# Patient Record
Sex: FEMALE | Race: OTHER | HISPANIC OR LATINO | Employment: FULL TIME | URBAN - METROPOLITAN AREA
[De-identification: names, ages, dates, MRNs, and addresses within clinical notes are randomized per-mention and may not be internally consistent; named-entity substitution may affect disease eponyms.]

---

## 2017-07-07 ENCOUNTER — HOSPITAL ENCOUNTER (EMERGENCY)
Facility: HOSPITAL | Age: 22
Discharge: HOME | End: 2017-07-07
Attending: EMERGENCY MEDICINE | Admitting: EMERGENCY MEDICINE
Payer: COMMERCIAL

## 2017-07-07 VITALS
RESPIRATION RATE: 18 BRPM | OXYGEN SATURATION: 99 % | HEART RATE: 87 BPM | WEIGHT: 180 LBS | SYSTOLIC BLOOD PRESSURE: 134 MMHG | TEMPERATURE: 97.8 F | DIASTOLIC BLOOD PRESSURE: 77 MMHG

## 2017-07-07 DIAGNOSIS — J06.9 URI WITH COUGH AND CONGESTION: Primary | ICD-10-CM

## 2017-07-07 PROCEDURE — 94640 AIRWAY INHALATION TREATMENT: CPT

## 2017-07-07 PROCEDURE — 99284 EMERGENCY DEPT VISIT MOD MDM: CPT

## 2017-07-07 RX ORDER — AMILORIDE HCL 5 MG
10 TABLET ORAL EVERY 6 HOURS PRN
Qty: 12 TABLET | Refills: 0 | Status: SHIPPED | OUTPATIENT
Start: 2017-07-07 | End: 2017-09-14 | Stop reason: ALTCHOICE

## 2017-07-07 RX ORDER — ALBUTEROL SULFATE 2.5 MG/3ML
2.5 SOLUTION RESPIRATORY (INHALATION) ONCE
Status: COMPLETED | OUTPATIENT
Start: 2017-07-07 | End: 2017-07-07

## 2017-07-07 RX ORDER — FLUTICASONE PROPIONATE 50 MCG
1 SPRAY, SUSPENSION (ML) NASAL DAILY
Qty: 16 G | Refills: 0 | Status: SHIPPED | OUTPATIENT
Start: 2017-07-07 | End: 2017-09-14 | Stop reason: ALTCHOICE

## 2017-07-07 RX ADMIN — ALBUTEROL SULFATE 2.5 MG: 2.5 SOLUTION RESPIRATORY (INHALATION) at 01:38

## 2017-09-14 ENCOUNTER — HOSPITAL ENCOUNTER (EMERGENCY)
Facility: HOSPITAL | Age: 22
Discharge: HOME | End: 2017-09-14
Attending: EMERGENCY MEDICINE | Admitting: EMERGENCY MEDICINE
Payer: COMMERCIAL

## 2017-09-14 VITALS
BODY MASS INDEX: 28.93 KG/M2 | HEIGHT: 66 IN | OXYGEN SATURATION: 98 % | SYSTOLIC BLOOD PRESSURE: 121 MMHG | WEIGHT: 180 LBS | RESPIRATION RATE: 18 BRPM | HEART RATE: 84 BPM | DIASTOLIC BLOOD PRESSURE: 78 MMHG | TEMPERATURE: 97.4 F

## 2017-09-14 DIAGNOSIS — B34.9 VIRAL SYNDROME: Primary | ICD-10-CM

## 2017-09-14 PROCEDURE — 94640 AIRWAY INHALATION TREATMENT: CPT

## 2017-09-14 PROCEDURE — 96372 THER/PROPH/DIAG INJ SC/IM: CPT

## 2017-09-14 PROCEDURE — 99283 EMERGENCY DEPT VISIT LOW MDM: CPT

## 2017-09-14 RX ORDER — FLUTICASONE PROPIONATE 50 MCG
1 SPRAY, SUSPENSION (ML) NASAL DAILY
Qty: 16 G | Refills: 0 | Status: SHIPPED | OUTPATIENT
Start: 2017-09-14 | End: 2017-10-18

## 2017-09-14 RX ORDER — KETOROLAC TROMETHAMINE 30 MG/ML
15 INJECTION, SOLUTION INTRAMUSCULAR; INTRAVENOUS ONCE
Status: COMPLETED | OUTPATIENT
Start: 2017-09-14 | End: 2017-09-14

## 2017-09-14 RX ADMIN — KETOROLAC TROMETHAMINE 15 MG: 30 INJECTION, SOLUTION INTRAMUSCULAR at 19:48

## 2017-09-14 RX ADMIN — ALBUTEROL SULFATE 5 MG: 2.5 SOLUTION RESPIRATORY (INHALATION) at 19:52

## 2017-10-18 ENCOUNTER — APPOINTMENT (EMERGENCY)
Dept: RADIOLOGY | Facility: HOSPITAL | Age: 22
End: 2017-10-18
Payer: COMMERCIAL

## 2017-10-18 ENCOUNTER — HOSPITAL ENCOUNTER (EMERGENCY)
Facility: HOSPITAL | Age: 22
Discharge: HOME | End: 2017-10-18
Attending: EMERGENCY MEDICINE | Admitting: EMERGENCY MEDICINE
Payer: COMMERCIAL

## 2017-10-18 VITALS
HEART RATE: 71 BPM | TEMPERATURE: 97.8 F | SYSTOLIC BLOOD PRESSURE: 124 MMHG | HEIGHT: 66 IN | RESPIRATION RATE: 18 BRPM | OXYGEN SATURATION: 99 % | DIASTOLIC BLOOD PRESSURE: 59 MMHG

## 2017-10-18 DIAGNOSIS — N39.0 UTI (URINARY TRACT INFECTION): Primary | ICD-10-CM

## 2017-10-18 LAB
ALBUMIN SERPL BCP-MCNC: 3.6 G/DL (ref 3.5–5)
ALP SERPL-CCNC: 69 U/L (ref 46–116)
ALT SERPL W P-5'-P-CCNC: 25 U/L (ref 12–78)
ANION GAP SERPL CALCULATED.3IONS-SCNC: 6 MMOL/L (ref 4–13)
AST SERPL W P-5'-P-CCNC: 17 U/L (ref 5–45)
BACTERIA UR QL AUTO: ABNORMAL /HPF
BASOPHILS # BLD AUTO: 0.01 THOUSANDS/ΜL (ref 0–0.1)
BASOPHILS NFR BLD AUTO: 0 % (ref 0–1)
BILIRUB SERPL-MCNC: 0.3 MG/DL (ref 0.2–1)
BILIRUB UR QL STRIP: NEGATIVE
BUN SERPL-MCNC: 13 MG/DL (ref 5–25)
CALCIUM SERPL-MCNC: 9 MG/DL (ref 8.3–10.1)
CHLORIDE SERPL-SCNC: 105 MMOL/L (ref 100–108)
CLARITY UR: ABNORMAL
CO2 SERPL-SCNC: 29 MMOL/L (ref 21–32)
COLOR UR: YELLOW
CREAT SERPL-MCNC: 0.79 MG/DL (ref 0.6–1.3)
EOSINOPHIL # BLD AUTO: 0.13 THOUSAND/ΜL (ref 0–0.61)
EOSINOPHIL NFR BLD AUTO: 1 % (ref 0–6)
ERYTHROCYTE [DISTWIDTH] IN BLOOD BY AUTOMATED COUNT: 13.6 % (ref 11.6–15.1)
EXT PREG TEST URINE: NEGATIVE
GFR SERPL CREATININE-BSD FRML MDRD: 107 ML/MIN/1.73SQ M
GLUCOSE SERPL-MCNC: 73 MG/DL (ref 65–140)
GLUCOSE UR STRIP-MCNC: NEGATIVE MG/DL
HCT VFR BLD AUTO: 41.5 % (ref 34.8–46.1)
HGB BLD-MCNC: 14.2 G/DL (ref 11.5–15.4)
HGB UR QL STRIP.AUTO: ABNORMAL
HYALINE CASTS #/AREA URNS LPF: ABNORMAL /LPF
KETONES UR STRIP-MCNC: NEGATIVE MG/DL
LEUKOCYTE ESTERASE UR QL STRIP: ABNORMAL
LYMPHOCYTES # BLD AUTO: 2.79 THOUSANDS/ΜL (ref 0.6–4.47)
LYMPHOCYTES NFR BLD AUTO: 26 % (ref 14–44)
MCH RBC QN AUTO: 29.5 PG (ref 26.8–34.3)
MCHC RBC AUTO-ENTMCNC: 34.2 G/DL (ref 31.4–37.4)
MCV RBC AUTO: 86 FL (ref 82–98)
MONOCYTES # BLD AUTO: 0.79 THOUSAND/ΜL (ref 0.17–1.22)
MONOCYTES NFR BLD AUTO: 7 % (ref 4–12)
NEUTROPHILS # BLD AUTO: 6.98 THOUSANDS/ΜL (ref 1.85–7.62)
NEUTS SEG NFR BLD AUTO: 66 % (ref 43–75)
NITRITE UR QL STRIP: NEGATIVE
NON-SQ EPI CELLS URNS QL MICRO: ABNORMAL /HPF
NRBC BLD AUTO-RTO: 0 /100 WBCS
PH UR STRIP.AUTO: 6 [PH] (ref 4.5–8)
PLATELET # BLD AUTO: 307 THOUSANDS/UL (ref 149–390)
PMV BLD AUTO: 10.2 FL (ref 8.9–12.7)
POTASSIUM SERPL-SCNC: 3.5 MMOL/L (ref 3.5–5.3)
PROT SERPL-MCNC: 7.8 G/DL (ref 6.4–8.2)
PROT UR STRIP-MCNC: ABNORMAL MG/DL
RBC # BLD AUTO: 4.82 MILLION/UL (ref 3.81–5.12)
RBC #/AREA URNS AUTO: ABNORMAL /HPF
SODIUM SERPL-SCNC: 140 MMOL/L (ref 136–145)
SP GR UR STRIP.AUTO: 1.02 (ref 1–1.03)
UROBILINOGEN UR QL STRIP.AUTO: 1 E.U./DL
WBC # BLD AUTO: 10.73 THOUSAND/UL (ref 4.31–10.16)
WBC #/AREA URNS AUTO: ABNORMAL /HPF

## 2017-10-18 PROCEDURE — 76856 US EXAM PELVIC COMPLETE: CPT

## 2017-10-18 PROCEDURE — 85025 COMPLETE CBC W/AUTO DIFF WBC: CPT | Performed by: EMERGENCY MEDICINE

## 2017-10-18 PROCEDURE — 96375 TX/PRO/DX INJ NEW DRUG ADDON: CPT

## 2017-10-18 PROCEDURE — 36415 COLL VENOUS BLD VENIPUNCTURE: CPT | Performed by: EMERGENCY MEDICINE

## 2017-10-18 PROCEDURE — 99284 EMERGENCY DEPT VISIT MOD MDM: CPT

## 2017-10-18 PROCEDURE — 80053 COMPREHEN METABOLIC PANEL: CPT | Performed by: EMERGENCY MEDICINE

## 2017-10-18 PROCEDURE — 81025 URINE PREGNANCY TEST: CPT | Performed by: EMERGENCY MEDICINE

## 2017-10-18 PROCEDURE — 76830 TRANSVAGINAL US NON-OB: CPT

## 2017-10-18 PROCEDURE — 87491 CHLMYD TRACH DNA AMP PROBE: CPT | Performed by: EMERGENCY MEDICINE

## 2017-10-18 PROCEDURE — 87086 URINE CULTURE/COLONY COUNT: CPT

## 2017-10-18 PROCEDURE — 81001 URINALYSIS AUTO W/SCOPE: CPT

## 2017-10-18 PROCEDURE — 96374 THER/PROPH/DIAG INJ IV PUSH: CPT

## 2017-10-18 PROCEDURE — 87591 N.GONORRHOEAE DNA AMP PROB: CPT | Performed by: EMERGENCY MEDICINE

## 2017-10-18 PROCEDURE — 96361 HYDRATE IV INFUSION ADD-ON: CPT

## 2017-10-18 RX ORDER — CEPHALEXIN 250 MG/1
500 CAPSULE ORAL EVERY 12 HOURS SCHEDULED
Qty: 12 CAPSULE | Refills: 0 | Status: SHIPPED | OUTPATIENT
Start: 2017-10-18 | End: 2017-11-28

## 2017-10-18 RX ORDER — KETOROLAC TROMETHAMINE 30 MG/ML
15 INJECTION, SOLUTION INTRAMUSCULAR; INTRAVENOUS ONCE
Status: COMPLETED | OUTPATIENT
Start: 2017-10-18 | End: 2017-10-18

## 2017-10-18 RX ORDER — ONDANSETRON 2 MG/ML
4 INJECTION INTRAMUSCULAR; INTRAVENOUS ONCE
Status: COMPLETED | OUTPATIENT
Start: 2017-10-18 | End: 2017-10-18

## 2017-10-18 RX ORDER — CEPHALEXIN 250 MG/1
500 CAPSULE ORAL ONCE
Status: COMPLETED | OUTPATIENT
Start: 2017-10-18 | End: 2017-10-18

## 2017-10-18 RX ADMIN — SODIUM CHLORIDE 1000 ML: 0.9 INJECTION, SOLUTION INTRAVENOUS at 20:01

## 2017-10-18 RX ADMIN — KETOROLAC TROMETHAMINE 15 MG: 30 INJECTION, SOLUTION INTRAMUSCULAR at 20:01

## 2017-10-18 RX ADMIN — CEPHALEXIN 500 MG: 250 CAPSULE ORAL at 21:28

## 2017-10-18 RX ADMIN — ONDANSETRON 4 MG: 2 INJECTION INTRAMUSCULAR; INTRAVENOUS at 20:01

## 2017-10-18 NOTE — ED PROVIDER NOTES
History  Chief Complaint   Patient presents with    Pelvic Pain     pelvic pain x 3 days ago, denies dysuria, denies n/v/d  denies fevers or chills      Patient presenting for evaluation of pelvic pain  Describes lower pelvic pain x 2 days  Intermittently will worsened in intensity on either the right or left sides  Patient describes a constant, colicky pain  Pain is moderate, 6/10 in severity  Patient has associated symptoms of pelvic pain  Patient does not have symptoms of vaginal bleeding, vaginal discharge, abdominal pain, nausea, vomiting, constipation, fever, decreased appetite, urinary symptoms, flank pain, chest pain, dehydration  Aggravating symptoms include nothing  Treatment prior to arrival includes nothing  Patient has history of no prior abdominal surgeries  History provided by:  Patient   used: No    Pelvic Pain   Associated symptoms: no abdominal pain, no diarrhea, no fatigue, no fever, no nausea and no vomiting        None       Past Medical History:   Diagnosis Date    Bronchitis        History reviewed  No pertinent surgical history  History reviewed  No pertinent family history  I have reviewed and agree with the history as documented  Social History   Substance Use Topics    Smoking status: Never Smoker    Smokeless tobacco: Never Used    Alcohol use Yes      Comment: social         Review of Systems   Constitutional: Negative  Negative for appetite change, chills, diaphoresis, fatigue and fever  HENT: Negative  Eyes: Negative  Respiratory: Negative  Cardiovascular: Negative  Gastrointestinal: Negative for abdominal pain, blood in stool, constipation, diarrhea, nausea and vomiting  Endocrine: Negative  Genitourinary: Positive for pelvic pain  Negative for decreased urine volume, difficulty urinating, dyspareunia, dysuria, flank pain, frequency, hematuria, urgency, vaginal bleeding, vaginal discharge and vaginal pain     Musculoskeletal: Negative  Skin: Negative  Allergic/Immunologic: Negative  Neurological: Negative  Psychiatric/Behavioral: Negative  All other systems reviewed and are negative  Physical Exam  ED Triage Vitals [10/18/17 1847]   Temperature Pulse Respirations Blood Pressure SpO2   97 8 °F (36 6 °C) 79 18 150/83 97 %      Temp Source Heart Rate Source Patient Position - Orthostatic VS BP Location FiO2 (%)   Oral Monitor Sitting Right arm --      Pain Score       5           Physical Exam   Constitutional: She is oriented to person, place, and time  She appears well-developed and well-nourished  HENT:   Head: Normocephalic and atraumatic  Right Ear: External ear normal    Left Ear: External ear normal    Nose: Nose normal    Mouth/Throat: Oropharynx is clear and moist    Eyes: Conjunctivae and EOM are normal  Pupils are equal, round, and reactive to light  Neck: Normal range of motion  Neck supple  No JVD present  No tracheal deviation present  No thyromegaly present  Cardiovascular: Normal rate, regular rhythm, normal heart sounds and intact distal pulses  Exam reveals no gallop and no friction rub  No murmur heard  Pulmonary/Chest: Effort normal and breath sounds normal  No stridor  No respiratory distress  She has no wheezes  She has no rales  She exhibits no tenderness  Abdominal: Soft  Bowel sounds are normal  She exhibits no distension and no mass  There is tenderness in the suprapubic area  There is no rebound and no guarding  No hernia  Genitourinary: Cervix exhibits discharge  Cervix exhibits no motion tenderness and no friability  Musculoskeletal: Normal range of motion  She exhibits no edema, tenderness or deformity  Lymphadenopathy:     She has no cervical adenopathy  Neurological: She is alert and oriented to person, place, and time  She has normal reflexes  She displays normal reflexes  No cranial nerve deficit  She exhibits normal muscle tone   Coordination normal    Skin: Skin is warm  No rash noted  No erythema  No pallor  Psychiatric: She has a normal mood and affect  Her behavior is normal  Judgment and thought content normal    Nursing note and vitals reviewed        ED Medications  Medications   cephalexin (KEFLEX) capsule 500 mg (not administered)   sodium chloride 0 9 % bolus 1,000 mL (1,000 mL Intravenous New Bag 10/18/17 2001)   ketorolac (TORADOL) 30 mg/mL injection 15 mg (15 mg Intravenous Given 10/18/17 2001)   ondansetron (ZOFRAN) injection 4 mg (4 mg Intravenous Given 10/18/17 2001)       Diagnostic Studies  Labs Reviewed   URINE MICROSCOPIC - Abnormal        Result Value Ref Range Status    WBC, UA Innumerable (*) None Seen, 0-5, 5-55, 5-65 /hpf Final    Epithelial Cells Moderate (*) None Seen, Occasional /hpf Final    Bacteria, UA Moderate (*) None Seen, Occasional /hpf Final    RBC, UA None Seen  None Seen, 0-5 /hpf Final    Hyaline Casts, UA None Seen  None Seen /lpf Final   CBC AND DIFFERENTIAL - Abnormal     WBC 10 73 (*) 4 31 - 10 16 Thousand/uL Final    RBC 4 82  3 81 - 5 12 Million/uL Final    Hemoglobin 14 2  11 5 - 15 4 g/dL Final    Hematocrit 41 5  34 8 - 46 1 % Final    MCV 86  82 - 98 fL Final    MCH 29 5  26 8 - 34 3 pg Final    MCHC 34 2  31 4 - 37 4 g/dL Final    RDW 13 6  11 6 - 15 1 % Final    MPV 10 2  8 9 - 12 7 fL Final    Platelets 473  724 - 390 Thousands/uL Final    nRBC 0  /100 WBCs Final    Neutrophils Relative 66  43 - 75 % Final    Lymphocytes Relative 26  14 - 44 % Final    Monocytes Relative 7  4 - 12 % Final    Eosinophils Relative 1  0 - 6 % Final    Basophils Relative 0  0 - 1 % Final    Neutrophils Absolute 6 98  1 85 - 7 62 Thousands/µL Final    Lymphocytes Absolute 2 79  0 60 - 4 47 Thousands/µL Final    Monocytes Absolute 0 79  0 17 - 1 22 Thousand/µL Final    Eosinophils Absolute 0 13  0 00 - 0 61 Thousand/µL Final    Basophils Absolute 0 01  0 00 - 0 10 Thousands/µL Final   ED URINE MACROSCOPIC - Abnormal     Leukocytes, UA Large (*) Negative Final    Protein, UA Trace (*) Negative mg/dl Final    Blood, UA Small (*) Negative Final    Color, UA Yellow   Final    Clarity, UA Slightly Cloudy   Final    pH, UA 6 0  4 5 - 8 0 Final    Nitrite, UA Negative  Negative Final    Glucose, UA Negative  Negative mg/dl Final    Ketones, UA Negative  Negative mg/dl Final    Urobilinogen, UA 1 0  0 2, 1 0 E U /dl E U /dl Final    Bilirubin, UA Negative  Negative Final    Specific Gravity, UA 1 025  1 003 - 1 030 Final    Narrative:     CLINITEK RESULT   POCT PREGNANCY, URINE - Normal    EXT PREG TEST UR (Ref: Negative) negative   Final   CHLAMYDIA /GC AMPLIFIED DNA   URINE CULTURE   COMPREHENSIVE METABOLIC PANEL    Sodium 666  136 - 145 mmol/L Final    Potassium 3 5  3 5 - 5 3 mmol/L Final    Chloride 105  100 - 108 mmol/L Final    CO2 29  21 - 32 mmol/L Final    Anion Gap 6  4 - 13 mmol/L Final    BUN 13  5 - 25 mg/dL Final    Creatinine 0 79  0 60 - 1 30 mg/dL Final    Comment: Standardized to IDMS reference method    Glucose 73  65 - 140 mg/dL Final    Comment:   If the patient is fasting, the ADA then defines impaired fasting glucose as > 100 mg/dL and diabetes as > or equal to 123 mg/dL  Specimen collection should occur prior to Sulfasalazine administration due to the potential for falsely depressed results  Specimen collection should occur prior to Sulfapyridine administration due to the potential for falsely elevated results  Calcium 9 0  8 3 - 10 1 mg/dL Final    AST 17  5 - 45 U/L Final    Comment:   Specimen collection should occur prior to Sulfasalazine administration due to the potential for falsely depressed results  ALT 25  12 - 78 U/L Final    Comment:   Specimen collection should occur prior to Sulfasalazine and/or Sulfapyridine administration due to the potential for falsely depressed results       Alkaline Phosphatase 69  46 - 116 U/L Final    Total Protein 7 8  6 4 - 8 2 g/dL Final    Albumin 3 6  3 5 - 5 0 g/dL Final    Total Bilirubin 0 30  0 20 - 1 00 mg/dL Final    eGFR 107  ml/min/1 73sq m Final    Narrative:     National Kidney Disease Education Program recommendations are as follows:  GFR calculation is accurate only with a steady state creatinine  Chronic Kidney disease less than 60 ml/min/1 73 sq  meters  Kidney failure less than 15 ml/min/1 73 sq  meters  US pelvis complete w transvaginal   Final Result   Small amounts of mobile blood/debris in the endometrial canal   Normal-appearing ovaries  No evidence of ovarian torsion at time of imaging  Workstation performed: [id number]             Procedures  Procedures      Phone Consults  ED Phone Contact    ED Course  ED Course as of Oct 18 2127   Wed Oct 18, 2017   1930 Leukocytes, UA: (!) Large   2019 Bacteria, UA: (!) Moderate   2019 WBC, UA: (!) Innumerable                               MDM  Number of Diagnoses or Management Options  UTI (urinary tract infection): new and requires workup  Diagnosis management comments: Patient presenting for evaluation of pelvic pain  Vital signs revealed no major abnormalities  Diagnosis at this time most consistent with urinary tract infection, pelvic inflammatory disease  Differential diagnosis considered including constipation, gastroenteritis, viral illness, urinary tract infection, dysuria, abdominal gas cramping, appendicitis, biliary colic, bowel obstruction, dysmenorrhea, ovarian cyst, pelvic inflammatory disease  These were thought to be less likely given the history, exam, and workup  Diagnostic testing performed: US of the pelvis shows no acute findings, urine appears infected  Pelvic exam did demonstrate some white discharge but no cervical motion tenderness no adnexal tenderness bilaterally  Cultures were taken    Treatments provided included IV Fluids, toradol  Rest, fluids, and over-the-counter symptomatic treatments were recommended      Follow up in one or two days, return to clinic or urgent care with new or worsening symptoms, present to ER with new or worsening symptoms  Amount and/or Complexity of Data Reviewed  Clinical lab tests: ordered and reviewed  Tests in the radiology section of CPT®: ordered and reviewed  Tests in the medicine section of CPT®: ordered and reviewed  Decide to obtain previous medical records or to obtain history from someone other than the patient: yes  Independent visualization of images, tracings, or specimens: yes    Patient Progress  Patient progress: stable    CritCare Time    Disposition  Final diagnoses:   UTI (urinary tract infection)     ED Disposition     ED Disposition Condition Comment    Discharge  Chinshae Rojelio discharge to home/self care  Condition at discharge: Good        Follow-up Information     Follow up With Specialties Details Why Contact Info    Tod Hall MD Pediatrics Schedule an appointment as soon as possible for a visit  [place] [id number]  [phone]          Patient's Medications   Discharge Prescriptions    CEPHALEXIN (KEFLEX) 250 MG CAPSULE    Take 2 capsules by mouth every 12 (twelve) hours for 3 days       Start Date: 10/18/2017End Date: 10/21/2017       Order Dose: 500 mg       Quantity: 12 capsule    Refills: 0     No discharge procedures on file  ED Provider  Attending physically available and evaluated Chinshae Rojelio HOOKER managed the patient along with the ED Attending      Electronically Signed by       Devendra Tse DO  Resident  10/18/17 2380

## 2017-10-19 LAB
BACTERIA UR CULT: NORMAL
CHLAMYDIA DNA CVX QL NAA+PROBE: NORMAL
N GONORRHOEA DNA GENITAL QL NAA+PROBE: NORMAL

## 2017-10-19 NOTE — ED ATTENDING ATTESTATION
Fredrick Gaspar MD, saw and evaluated the patient  I have discussed the patient with the resident/non-physician practitioner and agree with the resident's/non-physician practitioner's findings, Plan of Care, and MDM as documented in the resident's/non-physician practitioner's note, except where noted  All available labs and Radiology studies were reviewed  At this point I agree with the current assessment done in the Emergency Department    I have conducted an independent evaluation of this patient a history and physical is as follows:   the patient presents for evaluation of lower pelvic suprapubic pressure like pain she has had urinary symptoms with dysuria and some frequency no complaints of vomiting or diarrhea no fever no back pain   scan vaginal discharge no vaginal bleeding exam the patient is in no acute distress vital signs are stable she had febrile HEENT is unremarkable clear anicteric lungs clear heart regular abdomen soft positive bowel sounds mild suprapubic tenderness no CVA tenderness pelvic exam per resident scant vaginal discharge no adnexal mass or tenderness no cervical motion tenderness   urine positive for large bacteria   ultrasound pelvis normal flow to the ovaries nothing to suggest torsion    GC and chlamydia  swabperformed   clinical impression is urinary tract infection    Critical Care Time  CritCare Time

## 2017-10-19 NOTE — DISCHARGE INSTRUCTIONS
Urinary Tract Infection in Women   AMBULATORY CARE:   A urinary tract infection (UTI)  is caused by bacteria that get inside your urinary tract  Most bacteria that enter your urinary tract come out when you urinate  If the bacteria stay in your urinary tract, you may get an infection  Your urinary tract includes your kidneys, ureters, bladder, and urethra  Urine is made in your kidneys, and it flows from the ureters to the bladder  Urine leaves the bladder through the urethra  A UTI is more common in your lower urinary tract, which includes your bladder and urethra  Common symptoms include the following:   · Urinating more often or waking from sleep to urinate    · Pain or burning when you urinate    · Pain or pressure in your lower abdomen     · Urine that smells bad    · Blood in your urine    · Leaking urine  Seek care immediately if:   · You are urinating very little or not at all  · You have a high fever with shaking chills  · You have side or back pain that gets worse  Contact your healthcare provider if:   · You have a fever  · You do not feel better after 2 days of taking antibiotics  · You are vomiting  · You have questions or concerns about your condition or care  Treatment for a UTI  may include medicines to treat a bacterial infection  You may also need medicines to decrease pain and burning, or decrease the urge to urinate often  Prevent a UTI:   · Empty your bladder often  Urinate and empty your bladder as soon as you feel the need  Do not hold your urine for long periods of time  · Wipe from front to back after you urinate or have a bowel movement  This will help prevent germs from getting into your urinary tract through your urethra  · Drink liquids as directed  Ask how much liquid to drink each day and which liquids are best for you  You may need to drink more liquids than usual to help flush out the bacteria  Do not drink alcohol, caffeine, or citrus juices  These can irritate your bladder and increase your symptoms  Your healthcare provider may recommend cranberry juice to help prevent a UTI  · Urinate after you have sex  This can help flush out bacteria passed during sex  · Do not douche or use feminine deodorants  These can change the chemical balance in your vagina  · Change sanitary pads or tampons often  This will help prevent germs from getting into your urinary tract  · Do pelvic muscle exercises often  Pelvic muscle exercises may help you start and stop urinating  Strong pelvic muscles may help you empty your bladder easier  Squeeze these muscles tightly for 5 seconds like you are trying to hold back urine  Then relax for 5 seconds  Gradually work up to squeezing for 10 seconds  Do 3 sets of 15 repetitions a day, or as directed  Follow up with your healthcare provider as directed:  Write down your questions so you remember to ask them during your visits  © 2017 [employer] Information is for End User's use only and may not be sold, redistributed or otherwise used for commercial purposes  All illustrations and images included in CareNotes® are the copyrighted property of [employer]  or [employer] J[employer]  The above information is an  only  It is not intended as medical advice for individual conditions or treatments  Talk to your doctor, nurse or pharmacist before following any medical regimen to see if it is safe and effective for you

## 2017-11-27 VITALS
RESPIRATION RATE: 18 BRPM | TEMPERATURE: 98.8 F | DIASTOLIC BLOOD PRESSURE: 81 MMHG | HEIGHT: 66 IN | HEART RATE: 84 BPM | OXYGEN SATURATION: 99 % | SYSTOLIC BLOOD PRESSURE: 132 MMHG

## 2017-11-28 ENCOUNTER — APPOINTMENT (EMERGENCY)
Dept: RADIOLOGY | Facility: HOSPITAL | Age: 22
End: 2017-11-28

## 2017-11-28 ENCOUNTER — HOSPITAL ENCOUNTER (EMERGENCY)
Facility: HOSPITAL | Age: 22
Discharge: HOME | End: 2017-11-28
Attending: EMERGENCY MEDICINE

## 2017-11-28 ENCOUNTER — HOSPITAL ENCOUNTER (EMERGENCY)
Facility: HOSPITAL | Age: 22
Discharge: HOME | End: 2017-11-28
Attending: EMERGENCY MEDICINE | Admitting: EMERGENCY MEDICINE

## 2017-11-28 VITALS
BODY MASS INDEX: 28.93 KG/M2 | OXYGEN SATURATION: 97 % | WEIGHT: 180 LBS | SYSTOLIC BLOOD PRESSURE: 117 MMHG | DIASTOLIC BLOOD PRESSURE: 63 MMHG | RESPIRATION RATE: 16 BRPM | TEMPERATURE: 98 F | HEART RATE: 79 BPM | HEIGHT: 66 IN

## 2017-11-28 DIAGNOSIS — J06.9 URI (UPPER RESPIRATORY INFECTION): ICD-10-CM

## 2017-11-28 DIAGNOSIS — S92.301A FRACTURE OF UNSPECIFIED METATARSAL BONE(S), RIGHT FOOT, INITIAL ENCOUNTER FOR CLOSED FRACTURE: Primary | ICD-10-CM

## 2017-11-28 DIAGNOSIS — J02.9 SORE THROAT: Primary | ICD-10-CM

## 2017-11-28 PROCEDURE — 99283 EMERGENCY DEPT VISIT LOW MDM: CPT

## 2017-11-28 PROCEDURE — 73630 X-RAY EXAM OF FOOT: CPT

## 2017-11-28 PROCEDURE — 73610 X-RAY EXAM OF ANKLE: CPT

## 2017-11-28 RX ORDER — IBUPROFEN 800 MG/1
800 TABLET ORAL 3 TIMES DAILY
Qty: 21 TABLET | Refills: 0 | Status: SHIPPED | OUTPATIENT
Start: 2017-11-28 | End: 2018-01-13 | Stop reason: ALTCHOICE

## 2017-11-28 RX ORDER — IBUPROFEN 400 MG/1
800 TABLET ORAL ONCE
Status: COMPLETED | OUTPATIENT
Start: 2017-11-28 | End: 2017-11-28

## 2017-11-28 RX ADMIN — DEXAMETHASONE SODIUM PHOSPHATE 10 MG: 10 INJECTION, SOLUTION INTRAMUSCULAR; INTRAVENOUS at 01:23

## 2017-11-28 RX ADMIN — IBUPROFEN 800 MG: 400 TABLET, FILM COATED ORAL at 20:39

## 2017-11-28 NOTE — ED NOTES
Pt  Discharged from department by Dr Greg Zee  RN not present during discharge        Carry DONA Tobias  11/28/17 6250

## 2017-11-28 NOTE — ED ATTENDING ATTESTATION
Paulo Gonzalez MD, saw and evaluated the patient  I have discussed the patient with the resident/non-physician practitioner and agree with the resident's/non-physician practitioner's findings, Plan of Care, and MDM as documented in the resident's/non-physician practitioner's note, except where noted  All available labs and Radiology studies were reviewed  At this point I agree with the current assessment done in the Emergency Department  I have conducted an independent evaluation of this patient a history and physical is as follows:     The patient presents with complaints of a mild sore throat and loss of voice for the last 4 days she has been able to swallow without difficulty set no stridor or drooling she has no fever or chills she has no headache she states she has not been able to work because she needs to be able to talk at work  On exam she has a raspy voice no stridor no drooling throat is mildly erythematous uvula is midline there is no anterior neck tenderness or laryngeal tenderness there are no cervical adenopathy noted  Lungs clear heart regular  Impression:  Laryngitis  Will treat with steroids  Critical Care Time  CritCare Time

## 2017-11-28 NOTE — ED PROVIDER NOTES
History  Chief Complaint   Patient presents with    Sore Throat     Patient states sore throat and lost voice x4 days ago  This is an otherwise healthy 64-year-old female who presents with URI symptoms, loss of voice, and sore throat over the past 4 days  The patient has been complaining of runny nose, cough, and sore throat  She has also lost her voice  Denies difficulty swallowing, trismus, stridor, drooling, wheezing, throat/tongue swelling, ear pain  Denies sick contacts at home  Denies fever/chills, nausea/vomiting, lightheadedness/dizziness, numbness/weakness, headache, change in vision, neck pain, chest pain, palpitations, shortness of breath, cough, back pain, flank pain, abdominal pain, diarrhea, hematochezia, melena, dysuria, hematuria, abnormal vaginal discharge/bleeding  None       Past Medical History:   Diagnosis Date    Bronchitis        History reviewed  No pertinent surgical history  No family history on file  I have reviewed and agree with the history as documented  Social History   Substance Use Topics    Smoking status: Never Smoker    Smokeless tobacco: Never Used    Alcohol use Yes      Comment: social         Review of Systems   Constitutional: Negative for chills and fever  HENT: Positive for congestion, rhinorrhea, sore throat and voice change  Negative for trouble swallowing  Eyes: Negative for photophobia and visual disturbance  Respiratory: Positive for cough  Negative for chest tightness and shortness of breath  Cardiovascular: Negative for chest pain, palpitations and leg swelling  Gastrointestinal: Negative for abdominal pain, blood in stool, diarrhea, nausea and vomiting  Endocrine: Negative for polyuria  Genitourinary: Negative for dysuria, flank pain, hematuria, vaginal bleeding and vaginal discharge  Musculoskeletal: Negative for back pain and neck pain  Skin: Negative for color change and rash     Allergic/Immunologic: Negative for immunocompromised state  Neurological: Negative for dizziness, weakness, light-headedness, numbness and headaches  All other systems reviewed and are negative  Physical Exam  ED Triage Vitals [11/27/17 2251]   Temperature Pulse Respirations Blood Pressure SpO2   98 8 °F (37 1 °C) 84 18 132/81 99 %      Temp Source Heart Rate Source Patient Position - Orthostatic VS BP Location FiO2 (%)   Oral Monitor Sitting Left arm --      Pain Score       Worst Possible Pain           Orthostatic Vital Signs  Vitals:    11/27/17 2251   BP: 132/81   Pulse: 84   Patient Position - Orthostatic VS: Sitting       Physical Exam   Constitutional: Vital signs are normal  She appears well-developed  She is cooperative  No distress  HENT:   Mouth/Throat: Uvula is midline, oropharynx is clear and moist and mucous membranes are normal    Mildly erythematous oropharynx  Eyes: Conjunctivae and EOM are normal  Pupils are equal, round, and reactive to light  Neck: Trachea normal  No thyroid mass and no thyromegaly present  Cardiovascular: Normal rate, regular rhythm, normal heart sounds, intact distal pulses and normal pulses  No murmur heard  Pulmonary/Chest: Effort normal and breath sounds normal    Abdominal: Soft  Normal appearance and bowel sounds are normal  There is no tenderness  There is no rebound, no guarding and no CVA tenderness  Neurological: She is alert  Skin: Skin is warm, dry and intact  Psychiatric: She has a normal mood and affect   Her speech is normal and behavior is normal  Thought content normal        ED Medications  Medications   dexamethasone 10 mg/mL oral liquid 10 mg 1 mL (10 mg Oral Given 11/28/17 0123)       Diagnostic Studies  Results Reviewed     None                 No orders to display         Procedures  Procedures      Phone Consults  ED Phone Contact    ED Course  ED Course                                MDM  Number of Diagnoses or Management Options  Sore throat:   URI (upper respiratory infection):   Diagnosis management comments: The patient is likely suffering from a viral URI  I will treat her sore throat with p o  dexamethasone  I will discharge the patient with follow-up to her primary care doctor  Supportive care  CritCare Time    Disposition  Final diagnoses:   Sore throat   URI (upper respiratory infection)     Time reflects when diagnosis was documented in both MDM as applicable and the Disposition within this note     Time User Action Codes Description Comment    11/28/2017  1:10 AM Parmjit Melgar P Add [J02 9] Sore throat     11/28/2017  1:10 AM Gilbert Jackson P Add [J06 9] URI (upper respiratory infection)       ED Disposition     ED Disposition Condition Comment    Discharge  Jim Serrato discharge to home/self care  Condition at discharge: Stable        Follow-up Information     Follow up With Specialties Details Why Contact Info Additional Information    Saad Sanches MD Pediatrics Call in 2 days if symptoms do not resolve [facility] [address]  [address] [place] [address] [facility]  [address] Emergency Department Emergency Medicine Go to If symptoms worsen [address] [facility], [address], [address]        There are no discharge medications for this patient  No discharge procedures on file  ED Provider  Attending physically available and evaluated Jim Serrato  I managed the patient along with the ED Attending      Electronically Signed by         Khadar Raymundo MD  Resident  11/28/17 9818

## 2017-11-29 NOTE — ED ATTENDING ATTESTATION
Lul Nathan MD, saw and evaluated the patient  I have discussed the patient with the resident/non-physician practitioner and agree with the resident's/non-physician practitioner's findings, Plan of Care, and MDM as documented in the resident's/non-physician practitioner's note, except where noted  All available labs and Radiology studies were reviewed  At this point I agree with the current assessment done in the Emergency Department  I have conducted an independent evaluation of this patient including a focused history and a physical exam     28-year-old female presenting to the emergency department for evaluation of right ankle and foot pain after inversion injury as she was going down the stairs  Patient denied hitting her head and loss of consciousness  Pain is over the lateral malleolus as well as over the distal lateral foot  On examination patient has good cap refill  Sensation in all toes  Able to move all toes  Patient has ecchymosis and swelling over the distal aspect of the 5th metatarsal   Patient has swelling over the lateral malleolus  XR ankle 3+ views RIGHT   ED Interpretation   The no evidence of fracture        XR foot 3+ views RIGHT   ED Interpretation   Fracture distal 5th metatarsal

## 2017-11-29 NOTE — DISCHARGE INSTRUCTIONS
Foot Fracture in Adults   WHAT YOU NEED TO KNOW:   A foot fracture is a break in one or more of the bones in your foot  Foot fractures are commonly caused by trauma, falls, or repeated stress injuries  DISCHARGE INSTRUCTIONS:   Medicines:   · Antibiotics: This medicine is given to help treat or prevent an infection caused by bacteria  · NSAIDs:  These medicines decrease swelling and pain  NSAIDs are available without a doctor's order  Ask which medicine is right for you  Ask how much to take and when to take it  Take as directed  NSAIDs can cause stomach bleeding and kidney problems if not taken correctly  · Pain medicine: You may be given a prescription medicine to decrease pain  Do not wait until the pain is severe before you take this medicine  · Take your medicine as directed  Contact your healthcare provider if you think your medicine is not helping or if you have side effects  Tell him of her if you are allergic to any medicine  Keep a list of the medicines, vitamins, and herbs you take  Include the amounts, and when and why you take them  Bring the list or the pill bottles to follow-up visits  Carry your medicine list with you in case of an emergency  Follow up with your healthcare provider or bone specialist as directed: You may need to return to have your splint or stitches removed  You may also need to return for tests to make sure your foot is healing  Write down your questions so you remember to ask them during your visits  Wound care:  Carefully wash the wound with soap and water  Dry the area and put on new, clean bandages as directed  Change your bandages when they get wet or dirty  Self-care:   · Rest:  You may need to rest your foot and avoid activities that cause pain  For stress fractures, you will need to avoid the activity that caused the fracture until it heals  Ask when you can return to your normal activities such as work and sports      · Ice:  Ice helps decrease swelling and pain  Ice may also help prevent tissue damage  Use an ice pack or put crushed ice in a plastic bag  Cover it with a towel, and place it on your foot for 15 to 20 minutes every hour as directed  · Elevate your foot:  Raise your foot at or above the level of your heart as often as you can  This will help decrease swelling and pain  Prop your foot on pillows or blankets to keep it elevated comfortably  · Physical therapy: Once your foot has healed, a physical therapist can teach you exercises to help improve movement and strength, and to decrease pain  Splint care:   · Check the skin around your splint daily for any redness or open areas  · Do not use a sharp or pointed object to scratch your skin under the splint  · Do not remove your splint unless your healthcare provider or orthopedic surgeon says it is okay  Bathing with a splint:  Do not let your splint get wet  Before bathing, cover the splint with a plastic bag  Tape the bag to your skin above the splint to seal out the water  Keep your foot out of the water in case the bag leaks  Ask when it is okay to take a bath or shower  Assistive devices: You may be given a hard-soled shoe to wear while your foot is healing  You also may need to use crutches to help you walk while your foot heals  It is important to use your crutches correctly  Ask for more information about how to use crutches  Contact your healthcare provider or bone specialist if:   · You have a fever  · You have new sores around your boot or splint  · You have new or worsening trouble moving your foot  · You notice a foul smell coming from under your splint  · Your boot or splint gets damaged  · You have questions or concerns about your condition or care  Return to the emergency department if:   · The pain in your injured foot gets worse even after you rest and take pain medicine      · The skin or toes of your foot become numb, swollen, cold, white, or blue     · You have more pain or swelling than you did before the splint was put on  · Your wound is draining fluid or pus  · Blood soaks through your bandage  · Your leg feels warm, tender, and painful  It may look swollen and red  · You suddenly feel lightheaded and short of breath  · You have chest pain when you take a deep breath or cough  You may cough up blood  © 2017 [employer]  Information is for End User's use only and may not be sold, redistributed or otherwise used for commercial purposes  All illustrations and images included in CareNotes® are the copyrighted property of A D [employer] M [employer]  or [employer] Jeter  The above information is an  only  It is not intended as medical advice for individual conditions or treatments  Talk to your doctor, nurse or pharmacist before following any medical regimen to see if it is safe and effective for you

## 2017-12-02 NOTE — ED PROVIDER NOTES
History  Chief Complaint   Patient presents with    Fall     Pt tripped and fell down 2 stairs  Pt denies hitting head or LOC  Pt c/o right foot pain  This is a 25year old female presenting to the emergency department for evaluation of right foot pain after a trip and fall down 4-5 stairs  She felt her foot roll inward and felt a popping sensation  She has pain over her right foot laterally  She has been unable to walk on the foot since 2/2 pain  She denies any numbness weakness or tingling of the foot  None       Past Medical History:   Diagnosis Date    Bronchitis        History reviewed  No pertinent surgical history  History reviewed  No pertinent family history  I have reviewed and agree with the history as documented  Social History   Substance Use Topics    Smoking status: Never Smoker    Smokeless tobacco: Never Used    Alcohol use Yes      Comment: social         Review of Systems   Constitutional: Negative for appetite change, chills, fatigue and fever  HENT: Negative for sneezing and sore throat  Eyes: Negative for visual disturbance  Respiratory: Negative for cough, choking, chest tightness, shortness of breath and wheezing  Cardiovascular: Negative for chest pain and palpitations  Gastrointestinal: Negative for abdominal pain, constipation, diarrhea, nausea and vomiting  Genitourinary: Negative for difficulty urinating and dysuria  Musculoskeletal: Positive for arthralgias and gait problem  Neurological: Negative for dizziness, weakness, light-headedness, numbness and headaches  All other systems reviewed and are negative        Physical Exam  ED Triage Vitals [11/28/17 2011]   Temperature Pulse Respirations Blood Pressure SpO2   98 °F (36 7 °C) 79 16 117/63 97 %      Temp Source Heart Rate Source Patient Position - Orthostatic VS BP Location FiO2 (%)   Oral -- -- -- --      Pain Score       Worst Possible Pain           Orthostatic Vital Signs  Vitals: 11/28/17 2011   BP: 117/63   Pulse: 79       Physical Exam   Constitutional: She is oriented to person, place, and time  She appears well-developed and well-nourished  No distress  HENT:   Head: Normocephalic and atraumatic  Mouth/Throat: Oropharynx is clear and moist    Eyes: EOM are normal  Pupils are equal, round, and reactive to light  Neck: No JVD present  No tracheal deviation present  Cardiovascular: Normal rate, regular rhythm, normal heart sounds and intact distal pulses  Exam reveals no gallop and no friction rub  No murmur heard  Pulmonary/Chest: Effort normal and breath sounds normal  No respiratory distress  She has no wheezes  She has no rales  Abdominal: Soft  Bowel sounds are normal  She exhibits no distension  There is no tenderness  There is no rebound and no guarding  Musculoskeletal:   There is pain and ecchymosis over the lateral aspect of the dorsum of the right foot  Strength and sensation are intact with a 2+ DP pulse and brisk cap refill  Neurological: She is alert and oriented to person, place, and time  No cranial nerve deficit  She exhibits normal muscle tone  Skin: Skin is warm and dry  She is not diaphoretic  No pallor  Psychiatric: She has a normal mood and affect  Her behavior is normal    Nursing note and vitals reviewed  ED Medications  Medications   ibuprofen (MOTRIN) tablet 800 mg (800 mg Oral Given 11/28/17 2039)       Diagnostic Studies  Results Reviewed     None                 XR ankle 3+ views RIGHT   ED Interpretation by Mireya Laguna MD (11/28 2155)   The no evidence of fracture  Final Result by Yumiko Serrano MD (11/29 3581)      No evidence of a displaced fracture subluxation of the right ankle  Nondisplaced acute fracture of the distal right 5th metatarsal bone           Workstation performed: [id number]         XR foot 3+ views RIGHT   ED Interpretation by Mireya Laguna MD (11/28 2155)   Fracture distal 5th metatarsal       Final Result by Tyron Menezes MD (11/29 9165)      Acute nondisplaced fracture of the distal right 5th metatarsal bone  Findings concur with ED results as provided in PACS viewer/EPIC at the time of interpretation  Workstation performed: [id number]               Procedures  Procedures      Phone Consults  ED Phone Contact    ED Course  ED Course                                MDM  Number of Diagnoses or Management Options  Fracture of unspecified metatarsal bone(s), right foot, initial encounter for closed fracture:   Diagnosis management comments: 25year old female with right foot pain  Xray reveals right 5th MT nondisplaced fracture  WIll place in cast shoe, give crutches, pain meds, podiatry F/U    CritCare Time    Disposition  Final diagnoses:   Fracture of unspecified metatarsal bone(s), right foot, initial encounter for closed fracture     Time reflects when diagnosis was documented in both MDM as applicable and the Disposition within this note     Time User Action Codes Description Comment    11/28/2017  9:35 PM Erinn [facility] [B34 9] Viral illness     11/28/2017  9:46 PM Sarath Plasencia Remove [B34 9] Viral illness     11/28/2017  9:46 PM Todd Plasencia Add [S92 301A] Fracture of unspecified metatarsal bone(s), right foot, initial encounter for closed fracture       ED Disposition     ED Disposition Condition Comment    Discharge  Jim Serrato discharge to home/self care  Condition at discharge: Stable        Follow-up Information     Follow up With Specialties Details Why Contact Ginger Aviles, DPESTUARDO Podiatry   [address] [facility] [address] [facility]  [phone]          There are no discharge medications for this patient  No discharge procedures on file  ED Provider  Attending physically available and evaluated Jim Serrato I managed the patient along with the ED Attending      Electronically Signed by         Jewel Mackenzie MD  Resident  12/02/17 5814

## 2018-01-08 ENCOUNTER — HOSPITAL ENCOUNTER (EMERGENCY)
Facility: HOSPITAL | Age: 23
Discharge: HOME | End: 2018-01-08
Attending: EMERGENCY MEDICINE
Payer: COMMERCIAL

## 2018-01-08 VITALS
TEMPERATURE: 98.3 F | HEIGHT: 66 IN | BODY MASS INDEX: 30.53 KG/M2 | RESPIRATION RATE: 18 BRPM | HEART RATE: 84 BPM | WEIGHT: 190 LBS | OXYGEN SATURATION: 100 % | SYSTOLIC BLOOD PRESSURE: 146 MMHG | DIASTOLIC BLOOD PRESSURE: 97 MMHG

## 2018-01-08 DIAGNOSIS — Z34.90 PREGNANCY: ICD-10-CM

## 2018-01-08 DIAGNOSIS — R11.0 NAUSEA: Primary | ICD-10-CM

## 2018-01-08 DIAGNOSIS — R10.9 ABDOMINAL CRAMPING: ICD-10-CM

## 2018-01-08 LAB
BACTERIA UR QL AUTO: ABNORMAL /HPF
BILIRUB UR QL STRIP: ABNORMAL
CLARITY UR: CLEAR
COLOR UR: ABNORMAL
COLOR, POC: NORMAL
EXT PREG TEST URINE: POSITIVE
GLUCOSE UR STRIP-MCNC: NEGATIVE MG/DL
HGB UR QL STRIP.AUTO: ABNORMAL
HYALINE CASTS #/AREA URNS LPF: ABNORMAL /LPF
KETONES UR STRIP-MCNC: NEGATIVE MG/DL
LEUKOCYTE ESTERASE UR QL STRIP: NEGATIVE
NITRITE UR QL STRIP: NEGATIVE
NON-SQ EPI CELLS URNS QL MICRO: ABNORMAL /HPF
PH UR STRIP.AUTO: 6.5 [PH] (ref 4.5–8)
PROT UR STRIP-MCNC: ABNORMAL MG/DL
RBC #/AREA URNS AUTO: ABNORMAL /HPF
SP GR UR STRIP.AUTO: >=1.03 (ref 1–1.03)
UROBILINOGEN UR QL STRIP.AUTO: 0.2 E.U./DL
WBC #/AREA URNS AUTO: ABNORMAL /HPF

## 2018-01-08 PROCEDURE — 81025 URINE PREGNANCY TEST: CPT | Performed by: EMERGENCY MEDICINE

## 2018-01-08 PROCEDURE — 99283 EMERGENCY DEPT VISIT LOW MDM: CPT

## 2018-01-08 PROCEDURE — 81002 URINALYSIS NONAUTO W/O SCOPE: CPT | Performed by: EMERGENCY MEDICINE

## 2018-01-08 PROCEDURE — 81001 URINALYSIS AUTO W/SCOPE: CPT

## 2018-01-08 RX ORDER — ONDANSETRON 4 MG/1
4 TABLET, ORALLY DISINTEGRATING ORAL ONCE
Status: COMPLETED | OUTPATIENT
Start: 2018-01-08 | End: 2018-01-08

## 2018-01-08 RX ADMIN — ONDANSETRON 4 MG: 4 TABLET, ORALLY DISINTEGRATING ORAL at 16:29

## 2018-01-08 NOTE — ED ATTENDING ATTESTATION
Mayo Cook MD, saw and evaluated the patient  I have discussed the patient with the resident/non-physician practitioner and agree with the resident's/non-physician practitioner's findings, Plan of Care, and MDM as documented in the resident's/non-physician practitioner's note, except where noted  All available labs and Radiology studies were reviewed  At this point I agree with the current assessment done in the Emergency Department  I have conducted an independent evaluation of this patient a history and physical is as follows:      Critical Care Time  CritCare Time    Procedures     24 yo female c/o nausea for one week, no vomiting  Pt tolerating po, decreased appetite  Pt with lower abdominal cramping, week late on period  Pt with no vaginal bleeding, no vaginal discharge  No urinary complaints  No fever, no diarrhea  Vss, afebrile, lungs cta, rrr, abdomen soft nontender  Urine preg, urine  Reassurance, no concern for ectopic, will [facility]

## 2018-01-08 NOTE — ED PROVIDER NOTES
History  Chief Complaint   Patient presents with    Nausea     Patient states nausead since new years margarita  Patient states lower abdominal cramping     21yo female no known pmhx presents for evaluation of nausea x 7 days  Patient notes she has not vomited but nausea has been constant throughout the days  She has minimal appetite but tolerates PO  Also notes abdominal cramping that patient says "feels like period cramps," 4 out of 10, intermittent  Patient admits to taking 3 home pregnancy tests at home in last 24hrs and all have been positive  Patient says she is one week late and is normally regular, LMP about 5 weeks ago  Denies fever, chills, chest pain, SOB, diarrhea, dysuria or hematuria  Denies vaginal bleeding or abnormal discharge  Prior to Admission Medications   Prescriptions Last Dose Informant Patient Reported? Taking?   ibuprofen (MOTRIN) 800 mg tablet   No No   Sig: Take 1 tablet by mouth 3 (three) times a day      Facility-Administered Medications: None       Past Medical History:   Diagnosis Date    Bronchitis        History reviewed  No pertinent surgical history  No family history on file  I have reviewed and agree with the history as documented  Social History   Substance Use Topics    Smoking status: Never Smoker    Smokeless tobacco: Never Used    Alcohol use Yes      Comment: social         Review of Systems   Constitutional: Negative for chills, diaphoresis, fatigue and fever  HENT: Negative for congestion, rhinorrhea and sore throat  Eyes: Negative for photophobia and visual disturbance  Respiratory: Negative for cough, chest tightness and shortness of breath  Cardiovascular: Negative for chest pain and palpitations  Gastrointestinal: Positive for abdominal pain and nausea  Negative for blood in stool, constipation, diarrhea and vomiting  Genitourinary: Negative for dysuria, frequency and hematuria     Musculoskeletal: Negative for back pain, gait problem, myalgias, neck pain and neck stiffness  Skin: Negative for pallor and rash  Neurological: Negative for weakness, light-headedness, numbness and headaches  Hematological: Negative for adenopathy  Does not bruise/bleed easily  All other systems reviewed and are negative  Physical Exam  ED Triage Vitals [01/08/18 1511]   Temperature Pulse Respirations Blood Pressure SpO2   98 3 °F (36 8 °C) 84 18 146/97 100 %      Temp Source Heart Rate Source Patient Position - Orthostatic VS BP Location FiO2 (%)   Oral Monitor Sitting Left arm --      Pain Score       8           Orthostatic Vital Signs  Vitals:    01/08/18 1511   BP: 146/97   Pulse: 84   Patient Position - Orthostatic VS: Sitting       Physical Exam   Constitutional: She is oriented to person, place, and time  She appears well-developed and well-nourished  No distress  Patient is alert and oriented, appears well and nontoxic, in no acute distress   HENT:   Head: Normocephalic and atraumatic  Mouth/Throat: Oropharynx is clear and moist  No oropharyngeal exudate  Eyes: Conjunctivae and EOM are normal  Pupils are equal, round, and reactive to light  Neck: Normal range of motion  Neck supple  Cardiovascular: Normal rate, regular rhythm, normal heart sounds and intact distal pulses  Pulmonary/Chest: Effort normal and breath sounds normal  No respiratory distress  Abdominal: Soft  Bowel sounds are normal  She exhibits no distension  There is tenderness  There is no rebound and no guarding  Mild suprapubic tenderness on deep palpation    Musculoskeletal: Normal range of motion  Lymphadenopathy:     She has no cervical adenopathy  Neurological: She is alert and oriented to person, place, and time     No facial asymmetry noted, CN 2-12 intact, full ROM of upper and lower extremities, muscle strength 5/5 throughout, DTRs normal, sensation intact throughout, negative finger to nose/Danita, negative Romberg's, negative Babinski, no gait disturbance noted   Skin: Skin is warm and dry  Capillary refill takes less than 2 seconds  No rash noted  She is not diaphoretic  No erythema  No pallor  Psychiatric: She has a normal mood and affect  Her behavior is normal  Judgment and thought content normal    Nursing note and vitals reviewed  ED Medications  Medications   ondansetron (ZOFRAN-ODT) dispersible tablet 4 mg (4 mg Oral Given 1/8/18 1629)       Diagnostic Studies  Results Reviewed     Procedure Component Value Units Date/Time    Urine Microscopic [[id number]] Collected:  01/08/18 1709    Lab Status:   In process Specimen:  Urine from Urine, Other Updated:  01/08/18 1717    POCT urinalysis dipstick [[id number]]  (Normal) Resulted:  01/08/18 1711    Lab Status:  Final result Specimen:  Urine Updated:  01/08/18 1711     Color, UA lynette    POCT pregnancy, urine [[id number]]  (Abnormal) Resulted:  01/08/18 1708    Lab Status:  Final result Updated:  01/08/18 1709     EXT PREG TEST UR (Ref: Negative) Positive    ED Urine Macroscopic [[id number]]  (Abnormal) Collected:  01/08/18 1709    Lab Status:  Final result Specimen:  Urine Updated:  01/08/18 1707     Color, UA Lynette     Clarity, UA Clear     pH, UA 6 5     Leukocytes, UA Negative     Nitrite, UA Negative     Protein, UA Trace (A) mg/dl      Glucose, UA Negative mg/dl      Ketones, UA Negative mg/dl      Urobilinogen, UA 0 2 E U /dl      Bilirubin, UA Interference- unable to analyze (A)     Blood, UA Small (A)     Specific Gravity, UA >=1 030    Narrative:       CLINITEK RESULT                 No orders to display         Procedures  Procedures      Phone Consults  ED Phone Contact    ED Course  ED Course                                MDM  Number of Diagnoses or Management Options  Diagnosis management comments: Impression: 18yo female presents for evaluation of nausea and abdominal cramping   Ddx: pregnancy   Plan: urine preg, ua, zofran    CritCare Time    Disposition  Final diagnoses:   Nausea Abdominal cramping   Pregnancy     Time reflects when diagnosis was documented in both MDM as applicable and the Disposition within this note     Time User Action Codes Description Comment    1/8/2018  4:44 PM [facility] [R11 0] Nausea     1/8/2018  4:44 PM Bell Holland [R10 9] Abdominal cramping     1/8/2018  4:44 PM Bell Holland [T38 00] Pregnancy     1/8/2018  4:44 PM [facility] [N91 10] Pregnancy     1/8/2018  4:44 PM Rimma Samaniego Add [X44 79] Pregnancy       ED Disposition     ED Disposition Condition Comment    Discharge  Jim Rojelio discharge to home/self care  Condition at discharge: Good        Follow-up Information     Follow up With Specialties Details Why [address] [facility] Obstetrics and Gynecology Schedule an appointment as soon as possible for a visit To establish ob care [address] [place] [address] [address] Obstetrics and Gynecology Schedule an appointment as soon as possible for a visit To establish care [address] [facility] [id number] [phone]        Discharge Medication List as of 1/8/2018  5:21 PM      CONTINUE these medications which have NOT CHANGED    Details   ibuprofen (MOTRIN) 800 mg tablet Take 1 tablet by mouth 3 (three) times a day, Starting Tue 11/28/2017, Print           No discharge procedures on file  ED Provider  Attending physically available and evaluated Jim Levineuente  MORRO managed the patient along with the ED Attending      Electronically Signed by         Mel Mccoy MD  Resident  01/08/18 2886

## 2018-01-08 NOTE — DISCHARGE INSTRUCTIONS
Pregnancy   WHAT YOU NEED TO KNOW:   A normal pregnancy lasts about 40 weeks  The first trimester lasts from your last period through the 12th week of pregnancy  The second trimester lasts from the 13th week of your pregnancy through the 23rd week  The third trimester lasts from your 24th week of pregnancy until your baby is born  If you know the date of your last period, your healthcare provider can estimate your due date  You may give birth to your baby any time from 37 weeks to 2 weeks after your due date  DISCHARGE INSTRUCTIONS:   Seek care immediately if:   · You develop a severe headache that does not go away  · You have new or increased vision changes, such as blurred or spotted vision  · You have new or increased swelling in your face or hands  · You have pain or cramping in your abdomen or low back  · You have vaginal bleeding  Contact your healthcare provider or obstetrician if:   · You have abdominal cramps, pressure, or tightening  · You have a change in vaginal discharge  · You cannot keep food or drinks down, and you are losing weight  · You have chills or a fever  · You have vaginal itching, burning, or pain  · You have yellow, green, white, or foul-smelling vaginal discharge  · You have pain or burning when you urinate, less urine than usual, or pink or bloody urine  · You have questions or concerns about your condition or care  Medicines:   · Prenatal vitamins  provide some of the extra vitamins and minerals you need during pregnancy  Prenatal vitamins may also help to decrease the risk of certain birth defects  · Take your medicine as directed  Contact your healthcare provider if you think your medicine is not helping or if you have side effects  Tell him or her if you are allergic to any medicine  Keep a list of the medicines, vitamins, and herbs you take  Include the amounts, and when and why you take them   Bring the list or the pill bottles to follow-up visits  Carry your medicine list with you in case of an emergency  Follow up with your healthcare provider or obstetrician as directed:  Go to all of your prenatal visits during your pregnancy  Write down your questions so you remember to ask them during your visits  Body changes that may occur during your pregnancy:   · Breast changes  you will experience include tenderness and tingling during the early part of your pregnancy  Your breasts will become larger  You may need to use a support bra  You may see a thin, yellow fluid, called colostrum, leak from your nipples during the second trimester  Colostrum is a liquid that changes to milk about 3 days after you give birth  · Skin changes and stretch marks  may occur during your pregnancy  You may have red marks, called stretch marks, on your skin  Stretch marks will usually fade after pregnancy  Use lotion if your skin is dry and itchy  The skin on your face, around your nipples, and below your belly button may darken  Most of the time, your skin will return to its normal color after your baby is born  · Morning sickness  is nausea and vomiting that can happen at any time of day  Avoid fatty and spicy foods  Eat small meals throughout the day instead of large meals  Shante may help to decrease nausea  Ask your healthcare provider about other ways of decreasing nausea and vomiting  · Heartburn  may be caused by changes in your hormones during pregnancy  Your growing uterus may also push your stomach upward and force stomach acid to back up into your esophagus  Eat 4 or 5 small meals each day instead of large meals  Avoid spicy foods  Avoid eating right before bedtime  · Constipation  may develop during your pregnancy  To treat constipation, eat foods high in fiber such as fiber cereals, beans, fruits, vegetables, whole-grain breads, and prune juice  Get regular exercise and drink plenty of water   Your healthcare provider may also suggest a fiber supplement to soften your bowel movements  Talk to your healthcare provider before you use any medicines to decrease constipation  · Hemorrhoids  are enlarged veins in the rectal area  They may cause pain, itching, and bright red bleeding from your rectum  To decrease your risk of hemorrhoids, prevent constipation and do not strain to have a bowel movement  If you have hemorrhoids, soak in a tub of warm water to ease discomfort  Ask your healthcare provider how you can treat hemorrhoids  · Leg cramps and swelling  may be caused by low calcium levels or the added weight of pregnancy  Raise your legs above the level of your heart to decrease swelling  During a leg cramp, stretch or massage the muscle that has the cramp  Heat may help decrease pain and muscle spasms  Apply heat on your muscle for 20 to 30 minutes every 2 hours for as many days as directed  · Back pain  may occur as your baby grows  Do not stand for long periods of time or lift heavy items  Use good posture while you stand, squat, or bend  Wear low-heeled shoes with good support  Rest may also help to relieve back pain  Ask your healthcare provider about exercises you can do to strengthen your back muscles  Stay healthy during your pregnancy:   · Eat a variety of healthy foods  Healthy foods include fruits, vegetables, whole-grain breads, low-fat dairy foods, beans, lean meats, and fish  Drink liquids as directed  Ask how much liquid to drink each day and which liquids are best for you  Limit caffeine to less than 200 milligrams each day  Limit your intake of fish to 2 servings each week  Choose fish low in mercury such as canned light tuna, shrimp, crab, salmon, cod, or tilapia  Do not  eat fish high in mercury such as swordfish, tilefish, sachin mackerel, and shark  · Take prenatal vitamins as directed  Your need for certain vitamins and minerals, such as folic acid, increases during pregnancy   Prenatal vitamins provide some of the extra vitamins and minerals you need  Prenatal vitamins may also help to decrease the risk of certain birth defects  · Ask how much weight you should gain during your pregnancy  Too much or too little weight gain can be unhealthy for you and your baby  · Talk to your healthcare provider about exercise  Moderate exercise can help you stay fit  Your healthcare provider will help you plan an exercise program that is safe for you during pregnancy  · Do not smoke  If you smoke, it is never too late to quit  Smoking increases your risk of a miscarriage and other health problems during your pregnancy  Smoking can cause your baby to be born too early or weigh less at birth  Ask your healthcare provider for information if you need help quitting  · Do not drink alcohol  Alcohol passes from your body to your baby through the placenta  It can affect your baby's brain development and cause fetal alcohol syndrome (FAS)  FAS is a group of conditions that causes mental, behavior, and growth problems  · Talk to your healthcare provider before you take any medicines  Many medicines may harm your baby if you take them when you are pregnant  Do not take any medicines, vitamins, herbs, or supplements without first talking to your healthcare provider  Never use illegal or street drugs (such as marijuana or cocaine) while you are pregnant  Safety tips:   · Avoid hot tubs and saunas  Do not use a hot tub or sauna while you are pregnant, especially during your first trimester  Hot tubs and saunas may raise your baby's temperature and increase the risk of birth defects  · Avoid toxoplasmosis  This is an infection caused by eating raw meat or being around infected cat feces  It can cause birth defects, miscarriages, and other problems  Wash your hands after you touch raw meat  Make sure any meat is well-cooked before you eat it  Avoid raw eggs and unpasteurized milk   Use gloves or ask someone else to clean your cat's litter box while you are pregnant  · Ask your healthcare provider about travel  The most comfortable time to travel is during the second trimester  Ask your healthcare provider if you can travel after 36 weeks  You may not be able to travel in an airplane after 36 weeks  He may also recommend that you avoid long road trips  © 2017 [address] Information is for End User's use only and may not be sold, redistributed or otherwise used for commercial purposes  All illustrations and images included in CareNotes® are the copyrighted property of [employer]  or [address]  The above information is an  only  It is not intended as medical advice for individual conditions or treatments  Talk to your doctor, nurse or pharmacist before following any medical regimen to see if it is safe and effective for you

## 2018-01-13 ENCOUNTER — APPOINTMENT (EMERGENCY)
Dept: RADIOLOGY | Facility: HOSPITAL | Age: 23
End: 2018-01-13
Payer: COMMERCIAL

## 2018-01-13 ENCOUNTER — HOSPITAL ENCOUNTER (EMERGENCY)
Facility: HOSPITAL | Age: 23
Discharge: HOME | End: 2018-01-14
Attending: EMERGENCY MEDICINE
Payer: COMMERCIAL

## 2018-01-13 VITALS
BODY MASS INDEX: 30.53 KG/M2 | HEART RATE: 82 BPM | TEMPERATURE: 97.8 F | OXYGEN SATURATION: 98 % | HEIGHT: 66 IN | WEIGHT: 190 LBS | SYSTOLIC BLOOD PRESSURE: 136 MMHG | RESPIRATION RATE: 16 BRPM | DIASTOLIC BLOOD PRESSURE: 88 MMHG

## 2018-01-13 DIAGNOSIS — O46.90 VAGINAL BLEEDING IN PREGNANCY: Primary | ICD-10-CM

## 2018-01-13 LAB
ABO GROUP BLD: NORMAL
ANION GAP SERPL CALCULATED.3IONS-SCNC: 6 MMOL/L (ref 4–13)
B-HCG SERPL-ACNC: 66 MIU/ML
BACTERIA UR QL AUTO: ABNORMAL /HPF
BASOPHILS # BLD AUTO: 0.01 THOUSANDS/ΜL (ref 0–0.1)
BASOPHILS NFR BLD AUTO: 0 % (ref 0–1)
BILIRUB UR QL STRIP: NEGATIVE
BLD GP AB SCN SERPL QL: NEGATIVE
BUN SERPL-MCNC: 13 MG/DL (ref 5–25)
CALCIUM SERPL-MCNC: 9 MG/DL (ref 8.3–10.1)
CHLORIDE SERPL-SCNC: 104 MMOL/L (ref 100–108)
CLARITY UR: ABNORMAL
CO2 SERPL-SCNC: 28 MMOL/L (ref 21–32)
COLOR UR: YELLOW
CREAT SERPL-MCNC: 0.72 MG/DL (ref 0.6–1.3)
EOSINOPHIL # BLD AUTO: 0.47 THOUSAND/ΜL (ref 0–0.61)
EOSINOPHIL NFR BLD AUTO: 5 % (ref 0–6)
ERYTHROCYTE [DISTWIDTH] IN BLOOD BY AUTOMATED COUNT: 13.4 % (ref 11.6–15.1)
GFR SERPL CREATININE-BSD FRML MDRD: 120 ML/MIN/1.73SQ M
GLUCOSE SERPL-MCNC: 91 MG/DL (ref 65–140)
GLUCOSE UR STRIP-MCNC: NEGATIVE MG/DL
HCT VFR BLD AUTO: 41.8 % (ref 34.8–46.1)
HGB BLD-MCNC: 14.6 G/DL (ref 11.5–15.4)
HGB UR QL STRIP.AUTO: ABNORMAL
HYALINE CASTS #/AREA URNS LPF: ABNORMAL /LPF
KETONES UR STRIP-MCNC: NEGATIVE MG/DL
LEUKOCYTE ESTERASE UR QL STRIP: NEGATIVE
LYMPHOCYTES # BLD AUTO: 1.76 THOUSANDS/ΜL (ref 0.6–4.47)
LYMPHOCYTES NFR BLD AUTO: 19 % (ref 14–44)
MCH RBC QN AUTO: 29.6 PG (ref 26.8–34.3)
MCHC RBC AUTO-ENTMCNC: 34.9 G/DL (ref 31.4–37.4)
MCV RBC AUTO: 85 FL (ref 82–98)
MONOCYTES # BLD AUTO: 0.89 THOUSAND/ΜL (ref 0.17–1.22)
MONOCYTES NFR BLD AUTO: 10 % (ref 4–12)
NEUTROPHILS # BLD AUTO: 6.17 THOUSANDS/ΜL (ref 1.85–7.62)
NEUTS SEG NFR BLD AUTO: 66 % (ref 43–75)
NITRITE UR QL STRIP: NEGATIVE
NON-SQ EPI CELLS URNS QL MICRO: ABNORMAL /HPF
NRBC BLD AUTO-RTO: 0 /100 WBCS
PH UR STRIP.AUTO: 7 [PH] (ref 4.5–8)
PLATELET # BLD AUTO: 264 THOUSANDS/UL (ref 149–390)
PMV BLD AUTO: 10.4 FL (ref 8.9–12.7)
POTASSIUM SERPL-SCNC: 3.5 MMOL/L (ref 3.5–5.3)
PROT UR STRIP-MCNC: ABNORMAL MG/DL
RBC # BLD AUTO: 4.93 MILLION/UL (ref 3.81–5.12)
RBC #/AREA URNS AUTO: ABNORMAL /HPF
RH BLD: POSITIVE
SODIUM SERPL-SCNC: 138 MMOL/L (ref 136–145)
SP GR UR STRIP.AUTO: 1.02 (ref 1–1.03)
SPECIMEN EXPIRATION DATE: NORMAL
UROBILINOGEN UR QL STRIP.AUTO: 1 E.U./DL
WBC # BLD AUTO: 9.31 THOUSAND/UL (ref 4.31–10.16)
WBC #/AREA URNS AUTO: ABNORMAL /HPF

## 2018-01-13 PROCEDURE — 36415 COLL VENOUS BLD VENIPUNCTURE: CPT | Performed by: EMERGENCY MEDICINE

## 2018-01-13 PROCEDURE — 96361 HYDRATE IV INFUSION ADD-ON: CPT

## 2018-01-13 PROCEDURE — 85025 COMPLETE CBC W/AUTO DIFF WBC: CPT | Performed by: EMERGENCY MEDICINE

## 2018-01-13 PROCEDURE — 81025 URINE PREGNANCY TEST: CPT | Performed by: EMERGENCY MEDICINE

## 2018-01-13 PROCEDURE — 76815 OB US LIMITED FETUS(S): CPT

## 2018-01-13 PROCEDURE — 96360 HYDRATION IV INFUSION INIT: CPT

## 2018-01-13 PROCEDURE — 84702 CHORIONIC GONADOTROPIN TEST: CPT | Performed by: EMERGENCY MEDICINE

## 2018-01-13 PROCEDURE — 86901 BLOOD TYPING SEROLOGIC RH(D): CPT | Performed by: EMERGENCY MEDICINE

## 2018-01-13 PROCEDURE — 81001 URINALYSIS AUTO W/SCOPE: CPT

## 2018-01-13 PROCEDURE — 81002 URINALYSIS NONAUTO W/O SCOPE: CPT | Performed by: EMERGENCY MEDICINE

## 2018-01-13 PROCEDURE — 86900 BLOOD TYPING SEROLOGIC ABO: CPT | Performed by: EMERGENCY MEDICINE

## 2018-01-13 PROCEDURE — 86850 RBC ANTIBODY SCREEN: CPT | Performed by: EMERGENCY MEDICINE

## 2018-01-13 PROCEDURE — 80048 BASIC METABOLIC PNL TOTAL CA: CPT | Performed by: EMERGENCY MEDICINE

## 2018-01-13 RX ADMIN — SODIUM CHLORIDE 1000 ML: 0.9 INJECTION, SOLUTION INTRAVENOUS at 19:01

## 2018-01-14 PROCEDURE — 99284 EMERGENCY DEPT VISIT MOD MDM: CPT

## 2018-01-14 NOTE — ED NOTES
Attempted to call the on-call ultrasound tech, received a voice mail  Resident indicated that they had paged the technician prior        Mai Guerrier RN  01/13/18 [id number]

## 2018-01-14 NOTE — ED ATTENDING ATTESTATION
Charan Morales MD, saw and evaluated the patient  All available labs and X-rays were ordered by me or the resident and have been reviewed by myself  I discussed the patient with the resident / non-physician and agree with the resident's / non-physician practitioner's findings and plan as documented in the resident's / non-physician practicitioner's note, except where noted  At this point, I agree with the current assessment done in the ED  Chief Complaint   Patient presents with    Vaginal Bleeding - Pregnant     Patient states vaginal bleeding since last night  4 weeks pregnant  This is a 25year old female who is  presenting for vaginal bleeding  The patient is approximately 4 weeks pregnant  Since last night she has been spotting vaginal bleeding x3 days  Last menses was Dec 4th  Denies f/ch/n/v/cp/sob  Denies dizziness/LH  Never had similar before  Crampy suprapubpic pain x3 days  PMH:  - None  PSH:  - None  Denies smoking, drinking, drugs  PE:  Vitals:    18 1733 18 2037 18 2336   BP: 137/71 126/73 136/88   Pulse: (!) 106 74 82   Resp: 18  16   Temp: 97 8 °F (36 6 °C)     TempSrc: Oral     SpO2: 98% 99% 98%   Weight: 86 2 kg (190 lb)     Height: 5' 6" (1 676 m)     General: VSS, NAD, awake, alert  Well-nourished, well-developed  Appears stated age  Speaking normally in full sentences  Head: Normocephalic, atraumatic, nontender  Eyes: PERRL, EOM-I  No diplopia  No hyphema  No subconjunctival hemorrhages  Symmetrical lids  ENT: Atraumatic external nose and ears  MMM  No malocclusion  No stridor  Normal phonation  No drooling  Normal swallowing  Neck: Symmetric, trachea midline  No JVD  CV: RRR  +S1/S2  No murmurs or gallops  Peripheral pulses +2 throughout  No chest wall tenderness  Lungs:   Unlabored No retractions  CTAB, lungs sounds equal bilateral    No tachypnea  Abd: +BS, soft, NT/ND    MSK:   FROM   Back:   No rashes  Skin: Dry, intact  Neuro: AAOx3, GCS 15, CN II-XII grossly intact  Motor grossly intact  Psychiatric/Behavioral: Appropriate mood and affect   Exam: deferred  A:  - 1st trimester bleed  P:  - U/S for IUP (bedside found nothing)  - ABO  - HCG  - Dispo per U/S  - 13 point ROS was performed and all are normal unless stated in the history above  - Nursing note reviewed  Vitals reviewed  - Orders placed by myself and/or advanced practitioner / resident     - Previous chart was not reviewed  - No language barrier    - History obtained from patient  - There are no limitations to the history obtained  - Critical care time: Not applicable for this patient  Final Diagnosis:  1  Vaginal bleeding in pregnancy        ED Course as of Jan 14 0236   Sat Jan 13, 2018 1937 Miscarriage vs early pregnancy  Given pain, I think U/S is appropriate    HCG QUANTITATIVE: (!) 66   2053 Rh Factor: Positive   2053 ABO Grouping: AB   2328 RBC, UA: (!) Innumerable     Medications   sodium chloride 0 9 % bolus 1,000 mL (0 mL Intravenous Stopped 1/13/18 2121)     US OB pregnancy limited with transvaginal    (Results Pending)     Orders Placed This Encounter   Procedures    US OB pregnancy limited with transvaginal    CBC and differential    Basic metabolic panel    hCG, quantitative    Urine Microscopic    hCG, quantitative    POCT urinalysis dipstick    POCT pregnancy, urine    Type and screen     Labs Reviewed   HCG, QUANTITATIVE - Abnormal        Result Value Ref Range Status    HCG, Quant 66 (*) <=6 mIU/mL Final    Narrative:      Expected Ranges:     Approximate               Approximate HCG  Gestation age          Concentration ( mIU/mL)  _____________          ______________________   Zula Him                      HCG values  0 2-1                       5-50  1-2                           2-3                         100-5000  3-4                         [id number]  4-5                         1000-53569  5-6 [id number]  6-8                         [id number]  8-12                        [id number]   URINE MICROSCOPIC - Abnormal     RBC, UA Innumerable (*) None Seen, 0-5 /hpf Final    WBC, UA 2-4 (*) None Seen, 0-5, 5-55, 5-65 /hpf Final    Epithelial Cells None Seen  None Seen, Occasional /hpf Final    Bacteria, UA Occasional  None Seen, Occasional /hpf Final    Hyaline Casts, UA None Seen  None Seen /lpf Final   POCT URINALYSIS DIPSTICK - Abnormal    POCT PREGNANCY, URINE - Abnormal    ED URINE MACROSCOPIC - Abnormal     Color, UA Yellow   Final    Clarity, UA Cloudy   Final    pH, UA 7 0  4 5 - 8 0 Final    Leukocytes, UA Negative  Negative Final    Nitrite, UA Negative  Negative Final    Protein, UA 30 (1+) (*) Negative mg/dl Final    Glucose, UA Negative  Negative mg/dl Final    Ketones, UA Negative  Negative mg/dl Final    Urobilinogen, UA 1 0  0 2, 1 0 E U /dl E U /dl Final    Bilirubin, UA Negative  Negative Final    Blood, UA Large (*) Negative Final    Specific Gravity, UA 1 025  1 003 - 1 030 Final    Narrative:     CLINITEK RESULT   CBC AND DIFFERENTIAL - Normal    WBC 9 31  4 31 - 10 16 Thousand/uL Final    RBC 4 93  3 81 - 5 12 Million/uL Final    Hemoglobin 14 6  11 5 - 15 4 g/dL Final    Hematocrit 41 8  34 8 - 46 1 % Final    MCV 85  82 - 98 fL Final    MCH 29 6  26 8 - 34 3 pg Final    MCHC 34 9  31 4 - 37 4 g/dL Final    RDW 13 4  11 6 - 15 1 % Final    MPV 10 4  8 9 - 12 7 fL Final    Platelets 959  760 - 390 Thousands/uL Final    nRBC 0  /100 WBCs Final    Neutrophils Relative 66  43 - 75 % Final    Lymphocytes Relative 19  14 - 44 % Final    Monocytes Relative 10  4 - 12 % Final    Eosinophils Relative 5  0 - 6 % Final    Basophils Relative 0  0 - 1 % Final    Neutrophils Absolute 6 17  1 85 - 7 62 Thousands/µL Final    Lymphocytes Absolute 1 76  0 60 - 4 47 Thousands/µL Final    Monocytes Absolute 0 89  0 17 - 1 22 Thousand/µL Final    Eosinophils Absolute 0 47  0 00 - 0 61 Thousand/µL Final    Basophils Absolute 0 01  0 00 - 0 10 Thousands/µL Final   BASIC METABOLIC PANEL    Sodium 501  136 - 145 mmol/L Final    Potassium 3 5  3 5 - 5 3 mmol/L Final    Chloride 104  100 - 108 mmol/L Final    CO2 28  21 - 32 mmol/L Final    Anion Gap 6  4 - 13 mmol/L Final    BUN 13  5 - 25 mg/dL Final    Creatinine 0 72  0 60 - 1 30 mg/dL Final    Comment: Standardized to IDMS reference method    Glucose 91  65 - 140 mg/dL Final    Comment:   If the patient is fasting, the ADA then defines impaired fasting glucose as > 100 mg/dL and diabetes as > or equal to 123 mg/dL  Specimen collection should occur prior to Sulfasalazine administration due to the potential for falsely depressed results  Specimen collection should occur prior to Sulfapyridine administration due to the potential for falsely elevated results  Calcium 9 0  8 3 - 10 1 mg/dL Final    eGFR 120  ml/min/1 73sq m Final    Narrative:     National Kidney Disease Education Program recommendations are as follows:  GFR calculation is accurate only with a steady state creatinine  Chronic Kidney disease less than 60 ml/min/1 73 sq  meters  Kidney failure less than 15 ml/min/1 73 sq  meters  TYPE AND SCREEN    ABO Grouping AB   Final    Rh Factor Positive   Final    Antibody Screen Negative   Final    Specimen Expiration Date [id number]   Final     Time reflects when diagnosis was documented in both MDM as applicable and the Disposition within this note     Time User Action Codes Description Comment    1/14/2018 12:10 AM Candice HART Add [O20 0] Threatened miscarriage in early pregnancy     1/14/2018 12:10 AM Candice HART Remove [O20 0] Threatened miscarriage in early pregnancy     1/14/2018 12:10 AM Jovita James Add [O46 90] Vaginal bleeding in pregnancy       ED Disposition     ED Disposition Condition Comment    Discharge  Jim Pulidoe discharge to home/self care      Condition at discharge: Good        Follow-up Information Follow up With Specialties Details [facility] Schedule an appointment as soon as possible for a visit  [address] [phone]  [phone]     [facility] Emergency Department Emergency Medicine  As needed [address] [place] [address], [address] [place] [address], [facility]        There are no discharge medications for this patient  Outpatient Discharge Orders  hCG, quantitative   Standing Status: Future  Standing Exp  Date: 01/14/19       None       Portions of the record may have been created with voice recognition software  Occasional wrong word or "sound a like" substitutions may have occurred due to the inherent limitations of voice recognition software  Read the chart carefully and recognize, using context, where substitutions have occurred      Electronically signed by:  Zeny Kelsey

## 2018-01-14 NOTE — DISCHARGE INSTRUCTIONS
Threatened Miscarriage   WHAT YOU NEED TO KNOW:   A threatened miscarriage occurs when you have vaginal bleeding within the first 20 weeks of pregnancy  It means that a miscarriage may happen  A threatened miscarriage may also be called a threatened   DISCHARGE INSTRUCTIONS:   Return to the emergency department if:   · You feel weak or faint  · Your pain or cramping in your abdomen or back gets worse  · You have vaginal bleeding that soaks 1 or more pads in an hour  · You pass material that looks like tissue or large clots  Contact your healthcare provider or obstetrician if:   · You have a fever  · You have trouble urinating, burning when you urinate, or feel a need to urinate often  · You have new or worsening vaginal bleeding  · You have vaginal pain or itching, or vaginal discharge that is yellow, green, or foul-smelling  · You have questions or concerns about your condition or care  Self-care: The following may help you manage your symptoms and decrease your risk for a miscarriage:  · Do not put anything in your vagina  Do not have sex, douche, or use tampons  These actions may increase your risk for infection and miscarriage  · Rest as directed  Do not exercise or do strenuous activities  These activities may cause  labor or miscarriage  Ask your healthcare provider what activities are okay to do  Stay healthy during pregnancy:   · Eat a variety of healthy foods  Healthy foods can help you get extra protein, water, and calories that you need while you are pregnant  Healthy foods include fruits, vegetables, whole-grain breads, low-fat dairy products, beans, lean meats, and fish  Avoid raw or undercooked meat and fish  Ask your healthcare provider if you need a special diet  · Take prenatal vitamins as directed  These help you get the right amount of vitamins and minerals  They may also decrease the risk of certain birth defects      · Do not drink alcohol or use illegal drugs  These can increase your risk for a miscarriage or harm your baby  · Do not smoke  Nicotine and other chemicals in cigarettes and cigars can harm your baby and cause miscarriage or  labor  Ask your healthcare provider for information if you currently smoke and need help to quit  E-cigarettes or smokeless tobacco still contain nicotine  Do not use these products  · Decrease your risk for an infection  Always wash your hands before eating or preparing meals  Do not spend time with people who are sick  Ask your healthcare provider if you need immunizations such as the flu or hepatitis B vaccine  Immunizations may decrease your risk for infections that could cause a miscarriage  · Manage your medical conditions  Keep your blood pressure and blood sugars under control  Maintain a healthy weight during pregnancy  Follow up with your obstetrician as directed: You may need to see your obstetrician frequently for ultrasounds or blood tests  Write down your questions so you remember to ask them during your visits  ©  [address] [employer] [employer] Information is for End User's use only and may not be sold, redistributed or otherwise used for commercial purposes  All illustrations and images included in CareNotes® are the copyrighted property of [employer]  or [employer]  The above information is an  only  It is not intended as medical advice for individual conditions or treatments  Talk to your doctor, nurse or pharmacist before following any medical regimen to see if it is safe and effective for you

## 2018-01-14 NOTE — ED PROVIDER NOTES
History  Chief Complaint   Patient presents with    Vaginal Bleeding - Pregnant     Patient states vaginal bleeding since last night  4 weeks pregnant  25-year-old  who believes she is approximately 4-6 weeks pregnant presents complaining of vaginal bleeding and lower abdominal cramping  Patient reports having 3 days of light vaginal spotting  Reports having some cramping as suprapubically as well  Denies any fevers, chills but does report some nausea without vomiting  Last normal menstruation was   Denies any dysuria or back pain  None       Past Medical History:   Diagnosis Date    Bronchitis        History reviewed  No pertinent surgical history  History reviewed  No pertinent family history  I have reviewed and agree with the history as documented  Social History   Substance Use Topics    Smoking status: Never Smoker    Smokeless tobacco: Never Used    Alcohol use Yes      Comment: social         Review of Systems   Constitutional: Negative for chills and fever  HENT: Negative for congestion and sore throat  Eyes: Negative for pain and redness  Respiratory: Negative for shortness of breath and wheezing  Cardiovascular: Negative for chest pain and palpitations  Gastrointestinal: Positive for abdominal pain  Negative for diarrhea and vomiting  Endocrine: Negative for polydipsia and polyphagia  Genitourinary: Positive for vaginal bleeding  Negative for dysuria and flank pain  Musculoskeletal: Negative for arthralgias and back pain  Skin: Negative for rash and wound  Neurological: Negative for seizures and headaches  Psychiatric/Behavioral: Negative for agitation and behavioral problems  All other systems reviewed and are negative        Physical Exam  ED Triage Vitals [18 1733]   Temperature Pulse Respirations Blood Pressure SpO2   97 8 °F (36 6 °C) (!) 106 18 137/71 98 %      Temp Source Heart Rate Source Patient Position - Orthostatic VS BP Location FiO2 (%)   Oral Monitor Sitting Left arm --      Pain Score       8           Orthostatic Vital Signs  Vitals:    01/13/18 1733 01/13/18 2037 01/13/18 2336   BP: 137/71 126/73 136/88   Pulse: (!) 106 74 82   Patient Position - Orthostatic VS: Sitting         Physical Exam   Constitutional: She is oriented to person, place, and time  She appears well-developed and well-nourished  HENT:   Head: Normocephalic and atraumatic  Right Ear: External ear normal    Left Ear: External ear normal    Mouth/Throat: Oropharynx is clear and moist    Eyes: EOM are normal  Pupils are equal, round, and reactive to light  Neck: Normal range of motion  Cardiovascular: Normal rate, regular rhythm and normal heart sounds  Exam reveals no friction rub  No murmur heard  Pulmonary/Chest: Effort normal  No respiratory distress  She has no wheezes  Abdominal: Soft  Bowel sounds are normal  She exhibits no distension  There is no tenderness  There is no rebound and no guarding  Musculoskeletal: Normal range of motion  She exhibits no edema  Neurological: She is alert and oriented to person, place, and time  No cranial nerve deficit  Coordination normal    Skin: Skin is warm  No erythema  Psychiatric: She has a normal mood and affect  Her behavior is normal    Nursing note and vitals reviewed        ED Medications  Medications   sodium chloride 0 9 % bolus 1,000 mL (0 mL Intravenous Stopped 1/13/18 2121)       Diagnostic Studies  Results Reviewed     Procedure Component Value Units Date/Time    Urine Microscopic [[id number]]  (Abnormal) Collected:  01/13/18 2046    Lab Status:  Final result Specimen:  Urine from Urine, Other Updated:  01/13/18 2154     RBC, UA Innumerable (A) /hpf      WBC, UA 2-4 (A) /hpf      Epithelial Cells None Seen /hpf      Bacteria, UA Occasional /hpf      Hyaline Casts, UA None Seen /lpf     POCT urinalysis dipstick [[id number]]  (Abnormal) Resulted:  01/13/18 2046    Lab Status:  Final result Specimen:  Urine Updated:  01/13/18 2046    POCT pregnancy, urine [[id number]]  (Abnormal) Resulted:  01/13/18 2045    Lab Status:  Final result Updated:  01/13/18 2045    ED Urine Macroscopic [[id number]]  (Abnormal) Collected:  01/13/18 2046    Lab Status:  Final result Specimen:  Urine Updated:  01/13/18 2043     Color, UA Yellow     Clarity, UA Cloudy     pH, UA 7 0     Leukocytes, UA Negative     Nitrite, UA Negative     Protein, UA 30 (1+) (A) mg/dl      Glucose, UA Negative mg/dl      Ketones, UA Negative mg/dl      Urobilinogen, UA 1 0 E U /dl      Bilirubin, UA Negative     Blood, UA Large (A)     Specific [place], UA 1 025    Narrative:       CLINITEK RESULT    Basic metabolic panel [[id number]] Collected:  01/13/18 1848    Lab Status:  Final result Specimen:  Blood from Arm, Right Updated:  01/13/18 1928     Sodium 138 mmol/L      Potassium 3 5 mmol/L      Chloride 104 mmol/L      CO2 28 mmol/L      Anion Gap 6 mmol/L      BUN 13 mg/dL      Creatinine 0 72 mg/dL      Glucose 91 mg/dL      Calcium 9 0 mg/dL      eGFR 120 ml/min/1 73sq m     Narrative:         National Kidney Disease Education Program recommendations are as follows:  GFR calculation is accurate only with a steady state creatinine  Chronic Kidney disease less than 60 ml/min/1 73 sq  meters  Kidney failure less than 15 ml/min/1 73 sq  meters      hCG, quantitative [[id number]]  (Abnormal) Collected:  01/13/18 1848    Lab Status:  Final result Specimen:  Blood from Arm, Right Updated:  01/13/18 1928     HCG, Quant 66 (H) mIU/mL     Narrative:          Expected Ranges:     Approximate               Approximate HCG  Gestation age          Concentration ( mIU/mL)  _____________          ______________________   June Mirza                      HCG values  0 2-1                       5-50  1-2                           2-3                         100-5000  3-4                         500-49904  4-5                         1000-33371  5-6 08799-253211  6-8                         [id number]  8-12                        21133-156327    CBC and differential [[id number]]  (Normal) Collected:  18 1848    Lab Status:  Final result Specimen:  Blood from Arm, Right Updated:  18     WBC 9 31 Thousand/uL      RBC 4 93 Million/uL      Hemoglobin 14 6 g/dL      Hematocrit 41 8 %      MCV 85 fL      MCH 29 6 pg      MCHC 34 9 g/dL      RDW 13 4 %      MPV 10 4 fL      Platelets 126 Thousands/uL      nRBC 0 /100 WBCs      Neutrophils Relative 66 %      Lymphocytes Relative 19 %      Monocytes Relative 10 %      Eosinophils Relative 5 %      Basophils Relative 0 %      Neutrophils Absolute 6 17 Thousands/µL      Lymphocytes Absolute 1 76 Thousands/µL      Monocytes Absolute 0 89 Thousand/µL      Eosinophils Absolute 0 47 Thousand/µL      Basophils Absolute 0 01 Thousands/µL                  US OB pregnancy limited with transvaginal    (Results Pending)         Procedures  Procedures      Phone Consults  ED Phone Contact    ED Course  ED Course as of  012   Sun 2018   0001 US transvaginal VRAD: No IUP identified  Differential diagnosis includes early gestation, ectopic pregnancy and if the patient is bleeding threatened    Correlation with serial hCGs and follow-up are  recommended                                [facility]  Number of Diagnoses or Management Options  Diagnosis management comments: Impression:  Lower abdominal cramping, vaginal bleeding and pregnancy  Plan:  Labs including quant hCG, urinalysis, confirm intrauterine pregnancy    CritCare Time    Disposition  Final diagnoses:   Vaginal bleeding in pregnancy     Time reflects when diagnosis was documented in both MDM as applicable and the Disposition within this note     Time User Action Codes Description Comment    2018 12:10 AM Ria HART Add [O20 0] Threatened miscarriage in early pregnancy     2018 12:10 AM Lita Reyes [O20 0] Threatened miscarriage in early pregnancy     1/14/2018 12:10 AM Eliel Dura Add [O46 90] Vaginal bleeding in pregnancy       ED Disposition     ED Disposition Condition Comment    Discharge  Jim Rojelio discharge to home/self care  Condition at discharge: Good        Follow-up Information     Follow up With Specialties Details Why [facility] Schedule an appointment as soon as possible for a visit  [address] [id number]  [phone]     [facility] Emergency Department Emergency Medicine  As needed [address] [place] [address], [address] [place] [address], [facility]        There are no discharge medications for this patient  Outpatient Discharge Orders  hCG, quantitative   Standing Status: Future  Standing Exp  Date: 01/14/19         ED Provider  Attending physically available and evaluated Jim Serrato I managed the patient along with the ED Attending      Electronically Signed by         Christine Martinez MD  01/14/18 7224

## 2018-01-29 ENCOUNTER — OFFICE VISIT (OUTPATIENT)
Dept: OBGYN CLINIC | Facility: HOSPITAL | Age: 23
End: 2018-01-29
Payer: COMMERCIAL

## 2018-01-29 VITALS
SYSTOLIC BLOOD PRESSURE: 113 MMHG | DIASTOLIC BLOOD PRESSURE: 76 MMHG | WEIGHT: 225.2 LBS | HEART RATE: 64 BPM | BODY MASS INDEX: 36.35 KG/M2

## 2018-01-29 DIAGNOSIS — Z30.09 ENCOUNTER FOR COUNSELING REGARDING CONTRACEPTION: ICD-10-CM

## 2018-01-29 DIAGNOSIS — O03.9 MISCARRIAGE: Primary | ICD-10-CM

## 2018-01-29 LAB — SL AMB POCT URINE HCG: NEGATIVE

## 2018-01-29 PROCEDURE — 99202 OFFICE O/P NEW SF 15 MIN: CPT | Performed by: OBSTETRICS & GYNECOLOGY

## 2018-01-29 PROCEDURE — 81025 URINE PREGNANCY TEST: CPT | Performed by: OBSTETRICS & GYNECOLOGY

## 2018-01-29 NOTE — PROGRESS NOTES
Subjective      Kelly Ren is a 25 y o   female who presents for follow-up of a spontaneous  as well as contraception counseling  Patient reports her last menstrual period was in December  She was not on any contraception at the time  She was using condoms all the time  She reports in mid January she had some pain to into the emergency room  She then had bleeding 2 days later  Her quant HCG at the ER was 66 mIU/mL  She denies any vaginal bleeding, or pelvic cramping  She has a of motion all about the loss of this pregnancy, however understands that it was not her fault  Menstrual History:  OB History      Para Term  AB Living    2       1      SAB TAB Ectopic Multiple Live Births    1                   Patient's last menstrual period was 2017 (exact date)  The following portions of the patient's history were reviewed and updated as appropriate: allergies, current medications, past family history, past medical history, past social history, past surgical history and problem list     Review of Systems  Pertinent items are noted in HPI  Objective      /76 (BP Location: Left arm, Patient Position: Sitting, Cuff Size: Large)   Pulse 64   Wt 102 kg (225 lb 3 2 oz)   LMP 2017 (Exact Date)   Breastfeeding? Unknown   BMI 36 35 kg/m²     General:   alert and oriented, in no acute distress   :                                  Lab Review  HCG - quantitative and Urine pregnancy test      TVUS:   IMPRESSION:  No intrauterine gestation or adnexal mass identified  Differential remains early IUP, spontaneous  and ectopic pregnancy  Correlate with serial quantitative BHCG      Slightly heterogeneous endometrium may represent blood products within the endometrial cavity      1 9 cm mass along the anterior superior margin of the bladder immediately underlying the abdominal wall of unclear etiology   Consider computed tomography to more completely evaluate      Trace free fluid within the cul-de-sac  Assessment    18-year-old  010 two weeks status post spontaneous  of a biochemical pregnancy, here for contraception counseling  Time spent counseling 10 minutes    Plan  Biochemical pregnancy:  Patient reassured that most 1st trimester losses secondary to genetic abnormalities, and was nothing that she did to cause the miscarriage  I offered by support, advised the patient to take him and offered trying to get pregnant again to help her heal     Contraception:  Contraceptive options were reviewed, including hormonal methods, both combination (pill, patch, vaginal ring) and progesterone-only (pill, Depo Provera and Nexplanon), intrauterine devices (Mirena, Kiya and Paraguard), and barrier methods (condoms, diaphragm)  The mechanisms, risks, benefits and side effects of all methods were discussed  All questions have been answered to her satisfaction  Patient declines hormonal contraception at this time  She wishes to proceed with using condoms  Fertility:  Patient reports her partner would like to try to get pregnant  She however is very emotional about this loss and need some time  Patient was encouraged to start a contraceptive on top of using condoms, however she declined  Encouraged patient to start a prenatal vitamin if she were planning a pregnancy in the next few months  She was advised to call the office for a contraceptive prescription if she decides to proceed with something more than just condoms  If she were to get pregnant she should call when she is approximately 8 weeks  She was advised to use a menstrual tracker so she will know her last menstrual period for proper dating  She was advised to abstain from any alcohol consumption she is trying to get pregnant, as well as the to healthy diet and maintain healthy activity level of 30 minutes a day for 5 days a week      Patient expressed understanding of all of the counseling stated above      Carlton Martinez MD   OB/Gyn PGY-4  1/29/2018 4:44 PM

## 2018-04-01 ENCOUNTER — HOSPITAL ENCOUNTER (EMERGENCY)
Facility: HOSPITAL | Age: 23
Discharge: HOME | End: 2018-04-01
Attending: EMERGENCY MEDICINE
Payer: COMMERCIAL

## 2018-04-01 ENCOUNTER — APPOINTMENT (EMERGENCY)
Dept: RADIOLOGY | Facility: HOSPITAL | Age: 23
End: 2018-04-01
Payer: COMMERCIAL

## 2018-04-01 VITALS
BODY MASS INDEX: 36.32 KG/M2 | OXYGEN SATURATION: 100 % | HEART RATE: 65 BPM | WEIGHT: 225 LBS | TEMPERATURE: 97.7 F | RESPIRATION RATE: 18 BRPM | SYSTOLIC BLOOD PRESSURE: 105 MMHG | DIASTOLIC BLOOD PRESSURE: 64 MMHG

## 2018-04-01 DIAGNOSIS — M79.672 LEFT FOOT PAIN: Primary | ICD-10-CM

## 2018-04-01 PROCEDURE — 99283 EMERGENCY DEPT VISIT LOW MDM: CPT

## 2018-04-01 PROCEDURE — 73630 X-RAY EXAM OF FOOT: CPT

## 2018-04-01 PROCEDURE — 96372 THER/PROPH/DIAG INJ SC/IM: CPT

## 2018-04-01 RX ORDER — NAPROXEN 375 MG/1
375 TABLET ORAL 2 TIMES DAILY WITH MEALS
Qty: 20 TABLET | Refills: 0 | Status: SHIPPED | OUTPATIENT
Start: 2018-04-01 | End: 2018-05-14

## 2018-04-01 RX ORDER — KETOROLAC TROMETHAMINE 30 MG/ML
15 INJECTION, SOLUTION INTRAMUSCULAR; INTRAVENOUS ONCE
Status: COMPLETED | OUTPATIENT
Start: 2018-04-01 | End: 2018-04-01

## 2018-04-01 RX ADMIN — KETOROLAC TROMETHAMINE 15 MG: 30 INJECTION, SOLUTION INTRAMUSCULAR at 04:51

## 2018-04-01 NOTE — ED ATTENDING ATTESTATION
I, [address] [place] [address], DO, saw and evaluated the patient  I have discussed the patient with the resident/non-physician practitioner and agree with the resident's/non-physician practitioner's findings, Plan of Care, and MDM as documented in the resident's/non-physician practitioner's note, except where noted  All available labs and Radiology studies were reviewed  At this point I agree with the current assessment done in the Emergency Department  I have conducted an independent evaluation of this patient a history and physical is as follows:    20yo female presents with left lateral foot pain for about 1 week  Pt stands a lot at work at [email or web address] and states pain worse with mvmt and walking  No trauma  On exam - nad, heart reg, no resp distress, tender lateral left foot, no tenderness over maleoli, min calf tenderness    Plan - xray to r/o fracture and if normal recommend calf stretches and antiinflam    Critical Care Time  CritCare Time    Procedures

## 2018-04-01 NOTE — ED PROVIDER NOTES
History  Chief Complaint   Patient presents with    Foot Pain     pt  states that she has pain on left foot that has been going on for the past week  Pt  denies injuring the foot  Pain is on the outer aspect of the foot and the heel  Pt  states it hurts to stand on her foot  SUBJECTIVE:  Lizy Alex is a 25 y o  female who sustained a left foot injury 7 day(s) ago  Mechanism of injury: walking alot  Immediate symptoms: delayed pain, no deformity was noted by the patient  Symptoms have been acute and worsening since that time  Prior history of related problems: no prior problems with this area in the past     OBJECTIVE:  Vital signs as noted above  Appearance: alert, well appearing, and in no distress, oriented to person, place, and time and normal appearing weight  Foot/ankle exam: normal exam, no swelling, tenderness, instability; ligaments intact, FROM all ankle/foot joints  X-ray: no fracture or dislocation noted  ASSESSMENT:  foot sprain    PLAN:  rest the injured area as much as practical, apply ice packs, elevate the injured limb, purchase a splint  See orders for this visit as documented in the electronic medical record  None       Past Medical History:   Diagnosis Date    Bronchitis        History reviewed  No pertinent surgical history  Family History   Problem Relation Age of Onset    Cancer Father      skin    Rheum arthritis Maternal Grandmother      I have reviewed and agree with the history as documented      Social History   Substance Use Topics    Smoking status: Never Smoker    Smokeless tobacco: Never Used    Alcohol use Yes      Comment: social         Review of Systems    Physical Exam  ED Triage Vitals   Temperature Pulse Respirations Blood Pressure SpO2   04/01/18 0406 04/01/18 0406 04/01/18 0406 04/01/18 0411 04/01/18 0406   97 7 °F (36 5 °C) 65 18 105/64 100 %      Temp Source Heart Rate Source Patient Position - Orthostatic VS BP Location FiO2 (%) 04/01/18 0406 -- 04/01/18 0406 04/01/18 0406 --   Oral  Sitting Left arm       Pain Score       04/01/18 0406       8           Orthostatic Vital Signs  Vitals:    04/01/18 0406 04/01/18 0411   BP:  105/64   Pulse: 65    Patient Position - Orthostatic VS: Sitting Sitting       Physical Exam   Constitutional: She is oriented to person, place, and time  She appears well-developed and well-nourished  No distress  HENT:   Head: Normocephalic and atraumatic  Nose: Nose normal    Eyes: Conjunctivae and EOM are normal  Pupils are equal, round, and reactive to light  No scleral icterus  Neck: Normal range of motion  Neck supple  No JVD present  No tracheal deviation present  No thyromegaly present  Cardiovascular: Normal rate, regular rhythm, normal heart sounds and intact distal pulses  Exam reveals no gallop and no friction rub  Pulmonary/Chest: Effort normal and breath sounds normal  No respiratory distress  She has no wheezes  She has no rales  She exhibits no tenderness  Abdominal: Soft  Bowel sounds are normal  She exhibits no distension and no mass  There is no tenderness  There is no rebound and no guarding  No hernia  Musculoskeletal: Normal range of motion  She exhibits no edema, tenderness or deformity  Neurological: She is alert and oriented to person, place, and time  She has normal reflexes  No cranial nerve deficit  Coordination normal    Skin: Skin is warm and dry  She is not diaphoretic  No erythema  Psychiatric: She has a normal mood and affect  Her behavior is normal    Nursing note and vitals reviewed  ED Medications  Medications   ketorolac (TORADOL) injection 15 mg (15 mg Intramuscular Given 4/1/18 3478)       Diagnostic Studies  Results Reviewed     None                 XR foot 3+ views LEFT   ED Interpretation by Stephanie Palafox DO (04/01 5721)   No acute fracture or dislocation      Final Result by Khushboo Yadav MD (04/01 8848)      No acute osseous abnormality  Workstation performed: [id number]               Procedures  Procedures      Phone Consults  ED Phone Contact    ED Course  ED Course                                MDM  Number of Diagnoses or Management Options  Left foot pain: new and requires workup     Amount and/or Complexity of Data Reviewed  Tests in the radiology section of CPT®: reviewed and ordered  Independent visualization of images, tracings, or specimens: yes      CritCare Time    Disposition  Final diagnoses:   Left foot pain     Time reflects when diagnosis was documented in both MDM as applicable and the Disposition within this note     Time User Action Codes Description Comment    4/1/2018  5:13 AM Hilda Fernando Zamora [E74 212] Left foot pain       ED Disposition     ED Disposition Condition Comment    Discharge  Jim Serrato discharge to home/self care  Condition at discharge: Good        Follow-up Information     Follow up With Specialties Details Why Contact Info    Brit Gutierrez MD Pediatrics  If symptoms worsen [address] [place] [address]  [phone]          Discharge Medication List as of 4/1/2018  5:15 AM      START taking these medications    Details   naproxen (NAPROSYN) 375 mg tablet Take 1 tablet (375 mg total) by mouth 2 (two) times a day with meals, Starting Sun 4/1/2018, Print           No discharge procedures on file  ED Provider  Attending physically available and evaluated Jim Serrato I managed the patient along with the ED Attending      Electronically Signed by         Jeannine Carmona DO  04/05/18 [address] [place] [address],   04/05/18 [address] [place] [address], DO  04/10/18 2826

## 2018-04-01 NOTE — DISCHARGE INSTRUCTIONS
Arthralgia   WHAT YOU NEED TO KNOW:   Arthralgia is pain in one or more joints, with no inflammation  It may be short-term and get better within 6 to 8 weeks  Arthralgia can be an early sign of arthritis  Arthralgia may be caused by a medical condition, such as a hormone disorder or a tumor  It may also be caused by an infection or injury  DISCHARGE INSTRUCTIONS:   Medicines: The following medicines may  be ordered for you:  · Acetaminophen  decreases pain  Ask how much to take and how often to take it  Follow directions  Acetaminophen can cause liver damage if not taken correctly  · NSAIDs  decrease pain and prevent swelling  Ask your healthcare provider which medicine is right for you  Ask how much to take and when to take it  Take as directed  NSAIDs can cause stomach bleeding and kidney problems if not taken correctly  · Pain relief cream  decreases pain  Use this cream as directed  · Take your medicine as directed  Contact your healthcare provider if you think your medicine is not helping or if you have side effects  Tell him of her if you are allergic to any medicine  Keep a list of the medicines, vitamins, and herbs you take  Include the amounts, and when and why you take them  Bring the list or the pill bottles to follow-up visits  Carry your medicine list with you in case of an emergency  Follow up with your healthcare provider or specialist as directed:  Write down your questions so you remember to ask them during your visits  Self-care:   · Apply heat  to help decrease pain  Use a heating pad or heat wrap  Apply heat for 20 to 30 minutes every 2 hours for as many days as directed  · Rest  as much as possible  Avoid activities that cause joint pain  · Apply ice  to help decrease swelling and pain  Ice may also help prevent tissue damage  Use an ice pack, or put crushed ice in a plastic bag   Cover it with a towel and place it on your painful joint for 15 to 20 minutes every hour or as directed  · Support  the joint with a brace or elastic wrap as directed  · Elevate  your joint above the level of your heart as often as you can to help decrease swelling and pain  Prop your painful joint on pillows or blankets to keep it elevated comfortably  · Lose weight  if you are overweight  Extra weight can put pressure on your joints and cause more pain  Ask your healthcare provider how much you should weigh  Ask him to help you create a weight loss plan  · Exercise  regularly to help improve joint movement and to decrease pain  Ask about the best exercise plan for you  Low-impact exercises can help take the pressure off your joints  Examples are walking, swimming, and water aerobics  Physical therapy:  A physical therapist teaches you exercises to help improve movement and strength, and to decrease pain  Ask your healthcare provider if physical therapy is right for you  Contact your healthcare provider or specialist if:   · You have a fever  · You continue to have joint pain that cannot be relieved with heat, ice, or medicine  · You have pain and inflammation around your joint  · You have questions or concerns about your condition or care  Return to the emergency department if:   · You have sudden, severe pain when you move your joint  · You have a fever and shaking chills  · You cannot move your joint  · You lose feeling on the side of your body where you have the painful joint  © 2017 [employer]  Information is for End User's use only and may not be sold, redistributed or otherwise used for commercial purposes  All illustrations and images included in CareNotes® are the copyrighted property of [employer]  or [address]  The above information is an  only  It is not intended as medical advice for individual conditions or treatments   Talk to your doctor, nurse or pharmacist before following any medical regimen to see if it is safe and effective for you  Metatarsalgia   AMBULATORY CARE:   Metatarsalgia  is pain in the ball of your foot, near your second, third, and fourth toes  Common signs and symptoms of metatarsalgia:  Symptoms usually develop over time, but you may have sudden pain from an injury  You may have any of the following:  · Pain at the ball of your foot or near your toes that gets worse when you walk or stand, especially on hard surfaces    · Pain during exercises such as running    · Sharp or shooting pain in your toes that may get worse when you flex your toes    · Tingling or numbness in your toes    · Feeling like you are walking over rocks, or that you have a bruise    · A change in the way you walk because you try to avoid putting pressure on the ball of your foot  Contact your healthcare provider if:   · You develop knee, back, or hip pain  · You have more pain or redness in the foot  · You have questions or concerns about your condition or care  Treatment:  The cause of your metatarsalgia will be treated, if possible  You may also need any of the following:  · NSAIDs , such as ibuprofen, help decrease swelling, pain, and fever  This medicine is available with or without a doctor's order  NSAIDs can cause stomach bleeding or kidney problems in certain people  If you take blood thinner medicine, always ask if NSAIDs are safe for you  Always read the medicine label and follow directions  Do not give these medicines to children under 10months of age without direction from your child's healthcare provider  · Ultrasound  may be used to relieve your pain  Sound waves from the ultrasound can help send heat deeper into your tissues  · A steroid injection  may help decrease inflammation  · Surgery  may be needed if other treatments do not work  Surgery is used to align the bones near your toes  You may also need surgery to fix a problem such as hammertoe    Manage or prevent metatarsalgia: · Rest your foot  If you play sports, you may not be able to do weight-bearing exercises  Examples include swimming and bike riding  Ask your healthcare provider which exercises are safe for you  · Apply ice as directed  Ice helps reduce pain and swelling  Use an ice pack, or put crushed ice in a plastic bag  Cover the pack or bag with a towel before you apply it to your foot  Apply ice for 15 to 20 minutes every hour, or as directed  · Use a cane or crutch if directed  These devices may help take pressure off your foot while it heals  · Wear proper shoes  Do not wear shoes that are narrow or tight  You may need to wear shoes that are wider than you usually wear  Choose shoes that do not have a raised heel  Shock-absorbing shoes can help prevent injury  These shoes will have extra support under your feet and toes  You can also add shoe cushions inside your shoes or to the bottoms of your feet, near your toes  The cushions may provide more support and make walking or standing more comfortable  Arch supports may help take pressure off your toes  · Reach or maintain a healthy weight  Extra weight can put pressure on your feet  Talk to your healthcare provider about a healthy weight for you  Your provider can help you create a safe weight loss plan if you are overweight  · Go to physical therapy if directed  A physical therapist can help improve your strength and range of motion  The therapist can also help you improve the way you walk to prevent metatarsalgia from happening again  Your therapist can also teach you exercises to help relieve your pain  Follow up with your healthcare provider as directed:  Write down your questions so you remember to ask them during your visits  © 2017 [address] Information is for End User's use only and may not be sold, redistributed or otherwise used for commercial purposes   All illustrations and images included in CareNotes® are the copyrighted property of [employer]  or [employer]  The above information is an  only  It is not intended as medical advice for individual conditions or treatments  Talk to your doctor, nurse or pharmacist before following any medical regimen to see if it is safe and effective for you

## 2018-05-14 ENCOUNTER — APPOINTMENT (EMERGENCY)
Dept: RADIOLOGY | Facility: HOSPITAL | Age: 23
End: 2018-05-14
Payer: COMMERCIAL

## 2018-05-14 ENCOUNTER — HOSPITAL ENCOUNTER (EMERGENCY)
Facility: HOSPITAL | Age: 23
Discharge: HOME | End: 2018-05-14
Attending: EMERGENCY MEDICINE | Admitting: EMERGENCY MEDICINE
Payer: COMMERCIAL

## 2018-05-14 VITALS
HEIGHT: 66 IN | HEART RATE: 82 BPM | TEMPERATURE: 98.8 F | BODY MASS INDEX: 36.16 KG/M2 | SYSTOLIC BLOOD PRESSURE: 124 MMHG | WEIGHT: 225 LBS | DIASTOLIC BLOOD PRESSURE: 64 MMHG | RESPIRATION RATE: 30 BRPM | OXYGEN SATURATION: 96 %

## 2018-05-14 DIAGNOSIS — J06.9 VIRAL URI WITH COUGH: ICD-10-CM

## 2018-05-14 DIAGNOSIS — R06.02 SHORTNESS OF BREATH: Primary | ICD-10-CM

## 2018-05-14 LAB
ALBUMIN SERPL BCP-MCNC: 3.8 G/DL (ref 3.5–5)
ALP SERPL-CCNC: 79 U/L (ref 46–116)
ALT SERPL W P-5'-P-CCNC: 19 U/L (ref 12–78)
ANION GAP SERPL CALCULATED.3IONS-SCNC: 7 MMOL/L (ref 4–13)
AST SERPL W P-5'-P-CCNC: 15 U/L (ref 5–45)
BASOPHILS # BLD AUTO: 0.03 THOUSANDS/ΜL (ref 0–0.1)
BASOPHILS NFR BLD AUTO: 0 % (ref 0–1)
BILIRUB SERPL-MCNC: 0.38 MG/DL (ref 0.2–1)
BUN SERPL-MCNC: 16 MG/DL (ref 5–25)
CALCIUM SERPL-MCNC: 9.7 MG/DL (ref 8.3–10.1)
CHLORIDE SERPL-SCNC: 109 MMOL/L (ref 100–108)
CO2 SERPL-SCNC: 21 MMOL/L (ref 21–32)
CREAT SERPL-MCNC: 0.82 MG/DL (ref 0.6–1.3)
EOSINOPHIL # BLD AUTO: 0.61 THOUSAND/ΜL (ref 0–0.61)
EOSINOPHIL NFR BLD AUTO: 4 % (ref 0–6)
ERYTHROCYTE [DISTWIDTH] IN BLOOD BY AUTOMATED COUNT: 13.4 % (ref 11.6–15.1)
EXT PREG TEST URINE: NEGATIVE
GFR SERPL CREATININE-BSD FRML MDRD: 103 ML/MIN/1.73SQ M
GLUCOSE SERPL-MCNC: 87 MG/DL (ref 65–140)
HCT VFR BLD AUTO: 43 % (ref 34.8–46.1)
HGB BLD-MCNC: 14.9 G/DL (ref 11.5–15.4)
LYMPHOCYTES # BLD AUTO: 2.17 THOUSANDS/ΜL (ref 0.6–4.47)
LYMPHOCYTES NFR BLD AUTO: 15 % (ref 14–44)
MCH RBC QN AUTO: 29.6 PG (ref 26.8–34.3)
MCHC RBC AUTO-ENTMCNC: 34.7 G/DL (ref 31.4–37.4)
MCV RBC AUTO: 86 FL (ref 82–98)
MONOCYTES # BLD AUTO: 1.18 THOUSAND/ΜL (ref 0.17–1.22)
MONOCYTES NFR BLD AUTO: 8 % (ref 4–12)
NEUTROPHILS # BLD AUTO: 10.07 THOUSANDS/ΜL (ref 1.85–7.62)
NEUTS SEG NFR BLD AUTO: 73 % (ref 43–75)
NRBC BLD AUTO-RTO: 0 /100 WBCS
PLATELET # BLD AUTO: 287 THOUSANDS/UL (ref 149–390)
PMV BLD AUTO: 10.1 FL (ref 8.9–12.7)
POTASSIUM SERPL-SCNC: 3.8 MMOL/L (ref 3.5–5.3)
PROT SERPL-MCNC: 8.2 G/DL (ref 6.4–8.2)
RBC # BLD AUTO: 5.03 MILLION/UL (ref 3.81–5.12)
SODIUM SERPL-SCNC: 137 MMOL/L (ref 136–145)
TROPONIN I SERPL-MCNC: <0.02 NG/ML
WBC # BLD AUTO: 14.08 THOUSAND/UL (ref 4.31–10.16)

## 2018-05-14 PROCEDURE — 99285 EMERGENCY DEPT VISIT HI MDM: CPT

## 2018-05-14 PROCEDURE — 71046 X-RAY EXAM CHEST 2 VIEWS: CPT

## 2018-05-14 PROCEDURE — 81025 URINE PREGNANCY TEST: CPT | Performed by: EMERGENCY MEDICINE

## 2018-05-14 PROCEDURE — 84484 ASSAY OF TROPONIN QUANT: CPT | Performed by: EMERGENCY MEDICINE

## 2018-05-14 PROCEDURE — 85025 COMPLETE CBC W/AUTO DIFF WBC: CPT | Performed by: EMERGENCY MEDICINE

## 2018-05-14 PROCEDURE — 80053 COMPREHEN METABOLIC PANEL: CPT | Performed by: EMERGENCY MEDICINE

## 2018-05-14 PROCEDURE — 93005 ELECTROCARDIOGRAM TRACING: CPT

## 2018-05-14 PROCEDURE — 36415 COLL VENOUS BLD VENIPUNCTURE: CPT | Performed by: EMERGENCY MEDICINE

## 2018-05-14 RX ORDER — KETOROLAC TROMETHAMINE 30 MG/ML
15 INJECTION, SOLUTION INTRAMUSCULAR; INTRAVENOUS ONCE
Status: DISCONTINUED | OUTPATIENT
Start: 2018-05-14 | End: 2018-05-14 | Stop reason: HOSPADM

## 2018-05-14 RX ORDER — IBUPROFEN 600 MG/1
600 TABLET ORAL EVERY 6 HOURS PRN
Qty: 30 TABLET | Refills: 0 | Status: SHIPPED | OUTPATIENT
Start: 2018-05-14 | End: 2018-05-29 | Stop reason: ALTCHOICE

## 2018-05-14 RX ORDER — OXYMETAZOLINE HYDROCHLORIDE 0.05 G/100ML
2 SPRAY NASAL ONCE
Status: DISCONTINUED | OUTPATIENT
Start: 2018-05-14 | End: 2018-05-14 | Stop reason: HOSPADM

## 2018-05-14 NOTE — ED ATTENDING ATTESTATION
Marjorie Kayser, MD, saw and evaluated the patient  I have discussed the patient with the resident/non-physician practitioner and agree with the resident's/non-physician practitioner's findings, Plan of Care, and MDM as documented in the resident's/non-physician practitioner's note, except where noted  All available labs and Radiology studies were reviewed  At this point I agree with the current assessment done in the Emergency Department  I have conducted an independent evaluation of this patient a history and physical is as follows:      Critical Care Time  CritCare Time    Procedures     26 yo female c/o sob and cough for three days  Pt also with bilateral ear pain, myalgias  Pt with no sick contacts  Pt with yellow sputum and left sided rib pain worse with cough  Pt with no pmh, no pe risk factors  No fever, no abdominal pain, no n/v/d, no urinary complaints  Pt also with some nose bleeds which resolve spont  vss, afebrile, tachypnea, sat 97% on ra, lungs cta, rrr, abdomen soft nontender, left upper rib tenderness  Likely chest wall pain and viral illness  cxr, ekg, pain meds

## 2018-05-15 LAB
ATRIAL RATE: 86 BPM
P AXIS: 67 DEGREES
PR INTERVAL: 144 MS
QRS AXIS: 82 DEGREES
QRSD INTERVAL: 74 MS
QT INTERVAL: 346 MS
QTC INTERVAL: 414 MS
T WAVE AXIS: 42 DEGREES
VENTRICULAR RATE: 86 BPM

## 2018-05-15 PROCEDURE — 93010 ELECTROCARDIOGRAM REPORT: CPT | Performed by: INTERNAL MEDICINE

## 2018-05-17 NOTE — ED PROVIDER NOTES
History  Chief Complaint   Patient presents with    Shortness of Breath     Pt states she has been having sob for about a week  Pt denies any hx of breathing problems  pt c/o generalized body aches and congestion and also has been having frequent nose bleeds over the past week as well  20-year-old female presents to the emergency department for evaluation of shortness of breath and myalgias for for 1 week  Patient states that she has also been having a nonproductive cough for 1 week  She is also complaining of rhinorrhea and bilateral ear pain  Patient does state that last week her friend was coming out of a cold  She states that she has not taken any medications for relief  She denies fever, chest pain, nausea, vomiting, abdominal pain, dysuria, constipation or diarrhea  None       Past Medical History:   Diagnosis Date    Bronchitis        History reviewed  No pertinent surgical history  Family History   Problem Relation Age of Onset    Cancer Father      skin    Rheum arthritis Maternal Grandmother      I have reviewed and agree with the history as documented  Social History   Substance Use Topics    Smoking status: Never Smoker    Smokeless tobacco: Never Used    Alcohol use Yes      Comment: social         Review of Systems   Constitutional: Negative for appetite change, chills, diaphoresis, fatigue and fever  HENT: Positive for postnasal drip and rhinorrhea  Negative for congestion, ear discharge, ear pain, hearing loss, sneezing and sore throat  Eyes: Negative for pain, discharge and redness  Respiratory: Positive for shortness of breath  Negative for choking, chest tightness, wheezing and stridor  Cardiovascular: Negative for chest pain and palpitations  Gastrointestinal: Negative for abdominal distention, abdominal pain, blood in stool, constipation, diarrhea, nausea and vomiting     Genitourinary: Negative for decreased urine volume, difficulty urinating, dysuria, flank pain, frequency and hematuria  Musculoskeletal: Positive for myalgias  Negative for arthralgias, gait problem, joint swelling and neck pain  Skin: Negative for color change, pallor and rash  Allergic/Immunologic: Negative for environmental allergies, food allergies and immunocompromised state  Neurological: Negative for dizziness, seizures, weakness, light-headedness, numbness and headaches  Hematological: Negative for adenopathy  Does not bruise/bleed easily  Psychiatric/Behavioral: Negative for agitation and behavioral problems  Physical Exam  ED Triage Vitals [05/14/18 1729]   Temperature Pulse Respirations Blood Pressure SpO2   98 8 °F (37 1 °C) 92 (!) 24 160/80 97 %      Temp Source Heart Rate Source Patient Position - Orthostatic VS BP Location FiO2 (%)   Oral Monitor Sitting Left arm --      Pain Score       8           Orthostatic Vital Signs  Vitals:    05/14/18 1729 05/14/18 1800   BP: 160/80 124/64   Pulse: 92 82   Patient Position - Orthostatic VS: Sitting        Physical Exam   Constitutional: She is oriented to person, place, and time  She appears well-developed and well-nourished  HENT:   Head: Normocephalic and atraumatic  Nose: Nose normal    Mouth/Throat: Oropharynx is clear and moist    Eyes: Conjunctivae and EOM are normal  Pupils are equal, round, and reactive to light  Neck: Normal range of motion  Neck supple  Cardiovascular: Normal rate, regular rhythm and normal heart sounds  Exam reveals no gallop and no friction rub  No murmur heard  Pulmonary/Chest: Effort normal and breath sounds normal    Abdominal: Soft  Bowel sounds are normal  She exhibits no distension  There is no tenderness  There is no rebound and no guarding  Musculoskeletal: Normal range of motion  Neurological: She is alert and oriented to person, place, and time  She has normal reflexes  Skin: Skin is warm and dry  No erythema  No pallor     Psychiatric: She has a normal mood and affect  Her behavior is normal    Nursing note and vitals reviewed  ED Medications  Medications - No data to display    Diagnostic Studies  Results Reviewed     Procedure Component Value Units Date/Time    POCT pregnancy, urine [[id number]]  (Normal) Resulted:  05/14/18 1857    Lab Status:  Final result Updated:  05/14/18 1857     EXT PREG TEST UR (Ref: Negative) negative    Comprehensive metabolic panel [[id number]]  (Abnormal) Collected:  05/14/18 1759    Lab Status:  Final result Specimen:  Blood from Arm, Left Updated:  05/14/18 1838     Sodium 137 mmol/L      Potassium 3 8 mmol/L      Chloride 109 (H) mmol/L      CO2 21 mmol/L      Anion Gap 7 mmol/L      BUN 16 mg/dL      Creatinine 0 82 mg/dL      Glucose 87 mg/dL      Calcium 9 7 mg/dL      AST 15 U/L      ALT 19 U/L      Alkaline Phosphatase 79 U/L      Total Protein 8 2 g/dL      Albumin 3 8 g/dL      Total Bilirubin 0 38 mg/dL      eGFR 103 ml/min/1 73sq m     Narrative:         National Kidney Disease Education Program recommendations are as follows:  GFR calculation is accurate only with a steady state creatinine  Chronic Kidney disease less than 60 ml/min/1 73 sq  meters  Kidney failure less than 15 ml/min/1 73 sq  meters  Troponin I [[id number]]  (Normal) Collected:  05/14/18 1759    Lab Status:  Final result Specimen:  Blood from Arm, Left Updated:  05/14/18 1836     Troponin I <0 02 ng/mL     Narrative:         [employer] Chemistry analyzer 99% cutoff is > 0 04 ng/mL in network labs    o cTnI 99% cutoff is useful only when applied to patients in the clinical setting of myocardial ischemia  o cTnI 99% cutoff should be interpreted in the context of clinical history, ECG findings and possibly cardiac imaging to establish correct diagnosis  o cTnI 99% cutoff may be suggestive but clearly not indicative of a coronary event without the clinical setting of myocardial ischemia      CBC and differential [[id number]]  (Abnormal) Collected:  05/14/18 1759 Lab Status:  Final result Specimen:  Blood from Arm, Left Updated:  05/14/18 1810     WBC 14 08 (H) Thousand/uL      RBC 5 03 Million/uL      Hemoglobin 14 9 g/dL      Hematocrit 43 0 %      MCV 86 fL      MCH 29 6 pg      MCHC 34 7 g/dL      RDW 13 4 %      MPV 10 1 fL      Platelets 894 Thousands/uL      nRBC 0 /100 WBCs      Neutrophils Relative 73 %      Lymphocytes Relative 15 %      Monocytes Relative 8 %      Eosinophils Relative 4 %      Basophils Relative 0 %      Neutrophils Absolute 10 07 (H) Thousands/µL      Lymphocytes Absolute 2 17 Thousands/µL      Monocytes Absolute 1 18 Thousand/µL      Eosinophils Absolute 0 61 Thousand/µL      Basophils Absolute 0 03 Thousands/µL                  X-ray chest 2 views   Final Result by Delmy Mendoza MD (05/14 2053)      No active pulmonary disease  Workstation performed: [id number]               Procedures  Procedures      Phone Consults  ED Phone Contact    ED Course                               MDM  Number of Diagnoses or Management Options  Shortness of breath:   Viral URI with cough:   Diagnosis management comments: 32-year-old female presents emergent department for evaluation of cough congestion shortness of breath myalgias  I suspect patient has a viral UR I  I will order EKG, troponin cbc cmp urine preg chest x-ray and nsaids for pain relief    CritCare Time    Disposition  Final diagnoses:   Shortness of breath   Viral URI with cough     Time reflects when diagnosis was documented in both MDM as applicable and the Disposition within this note     Time User Action Codes Description Comment    5/14/2018  6:48 PM Eric Cosby Add [R06 02] Shortness of breath     5/14/2018  6:49 PM Meliza Nj Add [J06 9  [id number]] Viral URI with cough       ED Disposition     ED Disposition Condition Comment    Discharge  Jim Serrato discharge to home/self care      Condition at discharge: Stable        Follow-up Information     Follow up With Specialties Details Why [facility]  In 3 days  [address] [facility] [address]        Discharge Medication List as of 5/14/2018  6:51 PM      START taking these medications    Details   ibuprofen (MOTRIN) 600 mg tablet Take 1 tablet (600 mg total) by mouth every 6 (six) hours as needed for mild pain, Starting Mon 5/14/2018, Print           No discharge procedures on file  ED Provider  Attending physically available and evaluated Jim Serrato I managed the patient along with the ED Attending      Electronically Signed by         Genesis Centeno MD  05/17/18 3989

## 2018-05-29 ENCOUNTER — APPOINTMENT (EMERGENCY)
Dept: RADIOLOGY | Facility: HOSPITAL | Age: 23
End: 2018-05-29
Payer: COMMERCIAL

## 2018-05-29 ENCOUNTER — HOSPITAL ENCOUNTER (EMERGENCY)
Facility: HOSPITAL | Age: 23
Discharge: HOME | End: 2018-05-29
Attending: EMERGENCY MEDICINE
Payer: COMMERCIAL

## 2018-05-29 VITALS
DIASTOLIC BLOOD PRESSURE: 58 MMHG | SYSTOLIC BLOOD PRESSURE: 129 MMHG | HEART RATE: 75 BPM | WEIGHT: 220.46 LBS | BODY MASS INDEX: 35.58 KG/M2 | OXYGEN SATURATION: 97 % | TEMPERATURE: 99 F | RESPIRATION RATE: 22 BRPM

## 2018-05-29 DIAGNOSIS — J04.0 LARYNGITIS: Primary | ICD-10-CM

## 2018-05-29 DIAGNOSIS — J06.9 URI (UPPER RESPIRATORY INFECTION): ICD-10-CM

## 2018-05-29 PROCEDURE — 94640 AIRWAY INHALATION TREATMENT: CPT

## 2018-05-29 PROCEDURE — 99284 EMERGENCY DEPT VISIT MOD MDM: CPT

## 2018-05-29 PROCEDURE — 71046 X-RAY EXAM CHEST 2 VIEWS: CPT

## 2018-05-29 RX ADMIN — DEXAMETHASONE SODIUM PHOSPHATE 10 MG: 10 INJECTION, SOLUTION INTRAMUSCULAR; INTRAVENOUS at 22:04

## 2018-05-29 RX ADMIN — RACEPINEPHRINE HYDROCHLORIDE 0.5 ML: 11.25 SOLUTION RESPIRATORY (INHALATION) at 22:24

## 2018-05-30 NOTE — ED NOTES
Pt denies having history of asthma  States using her significant other's inhaler  Reports sore throat, cough, and hoarse voice, worsening over the past four days        Jesus Schneider RN  05/29/18 2128

## 2018-05-30 NOTE — ED ATTENDING ATTESTATION
Bony Leigh MD, saw and evaluated the patient  I have discussed the patient with the resident/non-physician practitioner and agree with the resident's/non-physician practitioner's findings, Plan of Care, and MDM as documented in the resident's/non-physician practitioner's note, except where noted  All available labs and Radiology studies were reviewed  At this point I agree with the current assessment done in the Emergency Department  I have conducted an independent evaluation of this patient a history and physical is as follows:   Pt was ill around mothers day got better then started with cough trouble breathing sore throat and lost her voice yesterday  No fevers no chills no vomiting no diarrhea patient's boyfriend tried an albuterol pump on her without relief of her symptoms    PE alert patient has laryngitis TMs clear pharynx positive erythema no exudate no lymph nodes in the neck heart regular lungs clear abdomen soft nondistended MDM:  Will give Decadron check chest x-ray possible racemic epi    Critical Care Time  CritCare Time    Procedures

## 2018-05-30 NOTE — DISCHARGE INSTRUCTIONS
Laryngitis   WHAT YOU NEED TO KNOW:   Laryngitis is when your larynx is swollen because of an infection or irritation  The larynx is also called the voice box because it contains your vocal cords  Your vocal cords also swell and change shape, which can cause your voice to sound different  DISCHARGE INSTRUCTIONS:   Take your medicine as directed  Contact your healthcare provider if you think your medicine is not helping or if you have side effects  Tell him of her if you are allergic to any medicine  Keep a list of the medicines, vitamins, and herbs you take  Include the amounts, and when and why you take them  Bring the list or the pill bottles to follow-up visits  Carry your medicine list with you in case of an emergency  Prevent laryngitis:   · Rest your voice:  Do not shout or scream if you get laryngitis often  This will help prevent swelling and irritation of your larynx  · Avoid irritants and harmful substances:  Do not breathe in chemicals or allergens, such as pollen  Alcohol and tobacco can also irritate your larynx  · Avoid foods and liquids that can cause acid reflux: These may include carbonated drinks, spicy foods and sauces, citrus fruit, peppermint, and chocolate  · Avoid the spread of germs:        [facility] your hands often with soap and water  Carry germ-killing gel with you  You can use the gel to clean your hands when there is no soap and water available  ¨ Do not touch your eyes, nose, or mouth unless you have washed your hands first     ¨ Always cover your mouth when you cough  Cough into a tissue or your shirtsleeve so you do not spread germs from your hands  ¨ Try to avoid people who have a cold or the flu  If you are sick, stay away from others as much as possible  Follow up with your healthcare provider as directed:  Write down your questions so you remember to ask them during your visits  Contact your healthcare provider if:   · You have a fever      · You feel large, tender lumps in your neck  · You are hoarse for more than 7 days  · You have new or increased throat pain  · You have questions about your condition or care  Return to the emergency department if:   · Your throat is bleeding  · You are hoarse for more than 7 days and your chest feels tight  · You are drooling and have trouble swallowing  · You have trouble breathing  © 2017 [address]  Information is for End User's use only and may not be sold, redistributed or otherwise used for commercial purposes  All illustrations and images included in CareNotes® are the copyrighted property of [employer]  or [address]  The above information is an  only  It is not intended as medical advice for individual conditions or treatments  Talk to your doctor, nurse or pharmacist before following any medical regimen to see if it is safe and effective for you

## 2018-05-30 NOTE — ED PROVIDER NOTES
History  Chief Complaint   Patient presents with    Shortness of Breath     Pt lost voice yesterday and is SOB today  Pt has cough for 1 month but states it has been getting worse the past 4 days  29-year-old female presenting for evaluation of URI symptoms  Patient reports that she had a URI approximately 2-3 weeks ago that improved, however symptoms returned approximately 4-5 days ago  Patient complains of sore throat, cough and shortness of breath  She lost her voice yesterday  No recorded fevers at home  She has been trying NyQuil at home  Denies any CP, abdominal pain, nausea, vomiting, changes in stool, urinary complaints, rash  No known lung disease  Denies smoking  A/P:  29-year-old female with URI/laryngitis, will treat with Decadron, CXR to rule out pneumonia             ED 5/14 seen for SOB x1 week, had labs/CXR/EKG the    None       Past Medical History:   Diagnosis Date    Bronchitis        History reviewed  No pertinent surgical history  Family History   Problem Relation Age of Onset    Cancer Father      skin    Rheum arthritis Maternal Grandmother      I have reviewed and agree with the history as documented  Social History   Substance Use Topics    Smoking status: Never Smoker    Smokeless tobacco: Never Used    Alcohol use Yes      Comment: social         Review of Systems   Constitutional: Negative for chills, fever and unexpected weight change  HENT: Positive for congestion, sore throat and voice change  Negative for ear pain and rhinorrhea  Eyes: Negative for pain and visual disturbance  Respiratory: Positive for cough and shortness of breath  Cardiovascular: Negative for chest pain and leg swelling  Gastrointestinal: Negative for abdominal pain, constipation, diarrhea, nausea and vomiting  Endocrine: Negative for polydipsia, polyphagia and polyuria  Genitourinary: Negative for dysuria, frequency, hematuria and urgency     Musculoskeletal: Negative for back pain, myalgias and neck pain  Skin: Negative for color change and rash  Allergic/Immunologic: Negative for environmental allergies and immunocompromised state  Neurological: Negative for dizziness, weakness, light-headedness, numbness and headaches  Hematological: Negative for adenopathy  Does not bruise/bleed easily  Psychiatric/Behavioral: Negative for agitation and confusion  All other systems reviewed and are negative  Physical Exam  ED Triage Vitals   Temperature Pulse Respirations Blood Pressure SpO2   05/29/18 2117 05/29/18 2116 05/29/18 2116 05/29/18 2116 05/29/18 2116   (!) 97 4 °F (36 3 °C) 92 22 121/70 97 %      Temp Source Heart Rate Source Patient Position - Orthostatic VS BP Location FiO2 (%)   05/29/18 2117 05/29/18 2205 05/29/18 2205 05/29/18 2205 --   Tympanic Monitor Sitting Right arm       Pain Score       05/29/18 2116       No Pain           Orthostatic Vital Signs  Vitals:    05/29/18 2116 05/29/18 2205   BP: 121/70 129/58   Pulse: 92 75   Patient Position - Orthostatic VS:  Sitting       Physical Exam   Constitutional: She is oriented to person, place, and time  She appears well-developed and well-nourished  HENT:   Head: Normocephalic and atraumatic  Nose: Nose normal    Mouth/Throat: Uvula is midline and mucous membranes are normal  No oral lesions  No trismus in the jaw  No dental abscesses or uvula swelling  Posterior oropharyngeal erythema present  No oropharyngeal exudate, posterior oropharyngeal edema or tonsillar abscesses  Eyes: Conjunctivae and EOM are normal    Neck: Normal range of motion  Neck supple  Cardiovascular: Normal rate, regular rhythm and normal heart sounds  Pulmonary/Chest: Effort normal and breath sounds normal  No respiratory distress  She has no wheezes  She has no rales  She exhibits no tenderness  Abdominal: Soft  She exhibits no distension  There is no tenderness  There is no rebound     Musculoskeletal: She exhibits no edema or deformity  Neurological: She is alert and oriented to person, place, and time  She exhibits normal muscle tone  Coordination normal    Skin: Skin is warm and dry  No rash noted  Psychiatric: She has a normal mood and affect  Her behavior is normal    Nursing note and vitals reviewed  ED Medications  Medications   dexamethasone 10 mg/mL oral liquid 10 mg 1 mL (10 mg Oral Given 5/29/18 2204)   racepinephrine 2 25 % inhalation solution 0 5 mL (0 5 mL Inhalation Given 5/29/18 2224)       Diagnostic Studies  Results Reviewed     None                 XR chest 2 views   ED Interpretation by Ronni Sierra DO (05/29 2247)   Interpreted by myself: no acute abn      Final Result by Taco Bailey MD (05/29 2310)      No acute cardiopulmonary disease  Workstation performed: [id number]               Procedures  Procedures      Phone Consults  ED Phone Contact    ED Course  ED Course as of May 30 0335   Tue May 29, 2018   2243  Breathing and symptoms improved  No stridor  Patient requesting work note                                MDM  Number of Diagnoses or Management Options  Laryngitis:   URI (upper respiratory infection):   Diagnosis management comments: 52-year-old female with 4-5 days of URI symptoms/laryngitis, discussed symptomatic treatment at home, PCP follow-up       Amount and/or Complexity of Data Reviewed  Tests in the radiology section of CPT®: ordered and reviewed      CritCare Time    Disposition  Final diagnoses:   Laryngitis   URI (upper respiratory infection)     Time reflects when diagnosis was documented in both MDM as applicable and the Disposition within this note     Time User Action Codes Description Comment    5/29/2018 10:47 PM Kaylen BORRERO Add [J04 0] Laryngitis     5/29/2018 10:47 PM Kaylen BORRERO Add [J06 9] URI (upper respiratory infection)       ED Disposition     ED Disposition Condition Comment    Discharge  Jim Serrato discharge to home/self care      Condition at discharge: Stable        Follow-up Information     Follow up With Specialties Details Why Contact Info    Snow Simpson    [phone]           return to ED if you have any new or worsening symptoms          There are no discharge medications for this patient  No discharge procedures on file  ED Provider  Attending physically available and evaluated Jim Serrato I managed the patient along with the ED Attending      Electronically Signed by         Tyler Mendes DO  05/30/18 0779

## 2018-07-03 ENCOUNTER — ULTRASOUND (OUTPATIENT)
Dept: OBGYN CLINIC | Facility: HOSPITAL | Age: 23
End: 2018-07-03
Payer: COMMERCIAL

## 2018-07-03 VITALS
HEIGHT: 66 IN | WEIGHT: 236 LBS | BODY MASS INDEX: 37.93 KG/M2 | DIASTOLIC BLOOD PRESSURE: 79 MMHG | SYSTOLIC BLOOD PRESSURE: 116 MMHG

## 2018-07-03 DIAGNOSIS — N91.2 AMENORRHEA, UNSPECIFIED: Primary | ICD-10-CM

## 2018-07-03 PROCEDURE — 99213 OFFICE O/P EST LOW 20 MIN: CPT | Performed by: OBSTETRICS & GYNECOLOGY

## 2018-07-03 NOTE — PROGRESS NOTES
Assessment & Plan  Sarah Pierce is a 25 y o   at Unknown presenting for routine prenatal visit  *Prenatal vitamins: pt compliant   *Existing meds: reviewed  *dating TVUS: completed; Pt informed of due date  *N&V: denies any      ____________________________________________________________      Subjective  No complaints ;   Denies domestic violence; She denies contractions, loss of fluid, or vaginal bleeding  She denies fetal movements  Objective    FHR: 165      Vitals:    18 0840   BP: 116/79   Weight: 107 kg (236 lb)   Height: 5' 6" (1 676 m)       Pt education:  Diet counseling: encouraged soda + white rice + white bread avoidance; encouraged home prepared meals      Labs:  Lab Results   Component Value Date    WBC 14 08 (H) 2018    HGB 14 9 2018    HCT 43 0 2018    MCV 86 2018     2018       TVUS <10 weeks:  Anatomic detail is extremely limited at this gestational age  A single discrete embryonic pole is observed  Limb buds are present  Cardiac wall motion is observed at 165 bpm via 'M' mode  Measured crown-rump length corresponds to 8 weeks 2 days  Patient's last menstrual period was 2018 (exact date)  which is consistent with 8 weeks 5 days  The gestational sac is normal in appearance and located in the fundus of the uterus  There is no suspicion of a subchorionic hematoma  There is suspicion of a single anterior uterine myoma that is not clearly visualized on today's exam   Free fluid is not seen in the posterior cul-de-sac  The left ovary was unremarkable in size and appearance  The right ovary was not visualized  No gross abnormalities were noted on this examination

## 2018-07-31 ENCOUNTER — INITIAL PRENATAL (OUTPATIENT)
Dept: OBGYN CLINIC | Facility: HOSPITAL | Age: 23
End: 2018-07-31
Payer: COMMERCIAL

## 2018-07-31 ENCOUNTER — PATIENT OUTREACH (OUTPATIENT)
Dept: OBGYN CLINIC | Facility: HOSPITAL | Age: 23
End: 2018-07-31

## 2018-07-31 VITALS
DIASTOLIC BLOOD PRESSURE: 80 MMHG | HEART RATE: 84 BPM | BODY MASS INDEX: 37.22 KG/M2 | WEIGHT: 230.6 LBS | SYSTOLIC BLOOD PRESSURE: 132 MMHG

## 2018-07-31 DIAGNOSIS — Z34.91 PRENATAL CARE IN FIRST TRIMESTER: Primary | ICD-10-CM

## 2018-07-31 DIAGNOSIS — Z3A.12 12 WEEKS GESTATION OF PREGNANCY: ICD-10-CM

## 2018-07-31 PROCEDURE — 99211 OFF/OP EST MAY X REQ PHY/QHP: CPT

## 2018-07-31 NOTE — PROGRESS NOTES
OB Intake  1  Prenatal care in first trimester  - Prenatal Panel  - Ambulatory Referral to Maternal Fetal Medicine-sequential screen     2  12 weeks gestation of pregnanc    o Patient presents for OB intake interview  o Accompanied by: friend  o Isiah Weston  o LMP: Patient's last menstrual period was 05/05/2018 (exact date)  o U/S date: 7/3/2018  o Estimated Date of Delivery: 2/9/2019   - confirmed by LMP and U/S  o Signs and Symptoms of pregnancy:  - Constipation: no  - Headaches: no  - Cramping/spotting: no  Immunization Record  -   - There is no immunization history on file for this patient   o Tdap:  - Counseled to be given after 28 weeks  - Influenza vaccine discussed  o MRSA questionnaire: negative  o Dental visit within last 6 months- no, recommendations given    [facility]: referral made    Interview education:  Emerita Culp Pregnancy Essentials booklet given to patient  Reviewed and explained   Handouts given at todays visit  o Kwasi Meza & me phone application guide  o [facility] support center  o CDCs Response to [address] [place] Maternal Fetal Medicine  - Sequential screening pamphlet  - Cystic fibrosis pamphlet  o JASKARAN letter given    [facility] [address] [facility] information given

## 2018-07-31 NOTE — PROGRESS NOTES
SW MET WITH 23 Y/O- S- G2:P0:AB1-  ENGLISH SPEAKING YOUNG WOMAN FOR ASSESSMENT  PT RESIDES WITH MOTHER  PREGNANCY WAS NOT PLANNED BUT WELCOME  PT DENIES ANY USAGE OF DRUG, ALCOHOL OR SMOKING  NO  MENTAL HEALTH HISTORY, NO DOMESTIC VIOLENCE ISSUES  PT HAS MA AND NEED TO CALL WIC FOR APPOINTMENT  PT DENIES ANY QUESTION OR CONCERN AT THIS TIME  PT WILL CONTACT SW AS NEEDED

## 2018-08-05 ENCOUNTER — OB ABSTRACT (OUTPATIENT)
Dept: PERINATAL CARE | Facility: CLINIC | Age: 23
End: 2018-08-05

## 2018-08-06 ENCOUNTER — ROUTINE PRENATAL (OUTPATIENT)
Dept: PERINATAL CARE | Facility: CLINIC | Age: 23
End: 2018-08-06
Payer: COMMERCIAL

## 2018-08-06 ENCOUNTER — TRANSCRIBE ORDERS (OUTPATIENT)
Dept: LAB | Facility: HOSPITAL | Age: 23
End: 2018-08-06

## 2018-08-06 ENCOUNTER — APPOINTMENT (OUTPATIENT)
Dept: LAB | Facility: HOSPITAL | Age: 23
End: 2018-08-06
Payer: COMMERCIAL

## 2018-08-06 VITALS
HEART RATE: 66 BPM | HEIGHT: 66 IN | DIASTOLIC BLOOD PRESSURE: 74 MMHG | WEIGHT: 229.7 LBS | BODY MASS INDEX: 36.92 KG/M2 | SYSTOLIC BLOOD PRESSURE: 112 MMHG

## 2018-08-06 DIAGNOSIS — Z36.82 ENCOUNTER FOR ANTENATAL SCREENING FOR NUCHAL TRANSLUCENCY: Primary | ICD-10-CM

## 2018-08-06 DIAGNOSIS — O99.211 OBESITY AFFECTING PREGNANCY IN FIRST TRIMESTER: ICD-10-CM

## 2018-08-06 DIAGNOSIS — Z3A.13 13 WEEKS GESTATION OF PREGNANCY: ICD-10-CM

## 2018-08-06 PROBLEM — O03.9 SPONTANEOUS ABORTION IN FIRST TRIMESTER: Status: RESOLVED | Noted: 2018-01-29 | Resolved: 2018-08-06

## 2018-08-06 PROBLEM — Z30.09 ENCOUNTER FOR COUNSELING REGARDING CONTRACEPTION: Status: RESOLVED | Noted: 2018-01-29 | Resolved: 2018-08-06

## 2018-08-06 LAB
ABO GROUP BLD: NORMAL
BACTERIA UR QL AUTO: ABNORMAL /HPF
BASOPHILS # BLD AUTO: 0.02 THOUSANDS/ΜL (ref 0–0.1)
BASOPHILS NFR BLD AUTO: 0 % (ref 0–1)
BILIRUB UR QL STRIP: NEGATIVE
BLD GP AB SCN SERPL QL: NEGATIVE
CLARITY UR: CLEAR
COLOR UR: YELLOW
EOSINOPHIL # BLD AUTO: 0.04 THOUSAND/ΜL (ref 0–0.61)
EOSINOPHIL NFR BLD AUTO: 0 % (ref 0–6)
ERYTHROCYTE [DISTWIDTH] IN BLOOD BY AUTOMATED COUNT: 13.8 % (ref 11.6–15.1)
GLUCOSE UR STRIP-MCNC: NEGATIVE MG/DL
HBV SURFACE AG SER QL: NORMAL
HCT VFR BLD AUTO: 40.7 % (ref 34.8–46.1)
HGB BLD-MCNC: 14 G/DL (ref 11.5–15.4)
HGB UR QL STRIP.AUTO: NEGATIVE
HYALINE CASTS #/AREA URNS LPF: ABNORMAL /LPF
IMM GRANULOCYTES # BLD AUTO: 0.03 THOUSAND/UL (ref 0–0.2)
IMM GRANULOCYTES NFR BLD AUTO: 0 % (ref 0–2)
KETONES UR STRIP-MCNC: NEGATIVE MG/DL
LEUKOCYTE ESTERASE UR QL STRIP: ABNORMAL
LYMPHOCYTES # BLD AUTO: 1.94 THOUSANDS/ΜL (ref 0.6–4.47)
LYMPHOCYTES NFR BLD AUTO: 21 % (ref 14–44)
MCH RBC QN AUTO: 29.9 PG (ref 26.8–34.3)
MCHC RBC AUTO-ENTMCNC: 34.4 G/DL (ref 31.4–37.4)
MCV RBC AUTO: 87 FL (ref 82–98)
MONOCYTES # BLD AUTO: 0.64 THOUSAND/ΜL (ref 0.17–1.22)
MONOCYTES NFR BLD AUTO: 7 % (ref 4–12)
NEUTROPHILS # BLD AUTO: 6.57 THOUSANDS/ΜL (ref 1.85–7.62)
NEUTS SEG NFR BLD AUTO: 72 % (ref 43–75)
NITRITE UR QL STRIP: NEGATIVE
NON-SQ EPI CELLS URNS QL MICRO: ABNORMAL /HPF
NRBC BLD AUTO-RTO: 0 /100 WBCS
PH UR STRIP.AUTO: 7 [PH] (ref 4.5–8)
PLATELET # BLD AUTO: 257 THOUSANDS/UL (ref 149–390)
PMV BLD AUTO: 11.1 FL (ref 8.9–12.7)
PROT UR STRIP-MCNC: NEGATIVE MG/DL
RBC # BLD AUTO: 4.69 MILLION/UL (ref 3.81–5.12)
RBC #/AREA URNS AUTO: ABNORMAL /HPF
RH BLD: POSITIVE
RUBV IGG SERPL IA-ACNC: 12.3 IU/ML
SP GR UR STRIP.AUTO: 1.02 (ref 1–1.03)
SPECIMEN EXPIRATION DATE: NORMAL
UROBILINOGEN UR QL STRIP.AUTO: 1 E.U./DL
WBC # BLD AUTO: 9.24 THOUSAND/UL (ref 4.31–10.16)
WBC #/AREA URNS AUTO: ABNORMAL /HPF

## 2018-08-06 PROCEDURE — 76801 OB US < 14 WKS SINGLE FETUS: CPT | Performed by: OBSTETRICS & GYNECOLOGY

## 2018-08-06 PROCEDURE — 99201 PR OFFICE OUTPATIENT NEW 10 MINUTES: CPT | Performed by: OBSTETRICS & GYNECOLOGY

## 2018-08-06 PROCEDURE — 80081 OBSTETRIC PANEL INC HIV TSTG: CPT

## 2018-08-06 PROCEDURE — 76813 OB US NUCHAL MEAS 1 GEST: CPT | Performed by: OBSTETRICS & GYNECOLOGY

## 2018-08-06 PROCEDURE — 87086 URINE CULTURE/COLONY COUNT: CPT

## 2018-08-06 PROCEDURE — 81001 URINALYSIS AUTO W/SCOPE: CPT

## 2018-08-06 PROCEDURE — 36415 COLL VENOUS BLD VENIPUNCTURE: CPT

## 2018-08-06 RX ORDER — ASPIRIN 81 MG/1
81 TABLET ORAL DAILY
Qty: 90 TABLET | Refills: 3 | Status: SHIPPED | OUTPATIENT
Start: 2018-08-06 | End: 2018-08-26

## 2018-08-06 NOTE — PROGRESS NOTES
[address]: Ms Kiki Knott was seen today at 13w2d for nuchal translucency ultrasound  See ultrasound report under "OB Procedures" tab  Please don't hesitate to contact our office with any concerns or questions    Marc Cavazos MD

## 2018-08-06 NOTE — PATIENT INSTRUCTIONS
Thank you for choosing [facility] for your  care today  If you have any questions about your ultrasound or care, please do not hesitate to contact us or your primary obstetrician  Please begin taking aspirin 81mg daily for the prevention of preeclampsia  Please try to keep your weight gain to 11-20 pounds this pregnancy; this is best accomplished by regular aerobic exercise and healthy eating

## 2018-08-07 LAB — RPR SER QL: NORMAL

## 2018-08-08 LAB
BACTERIA UR CULT: ABNORMAL
BACTERIA UR CULT: ABNORMAL
HIV 1+2 AB+HIV1 P24 AG SERPL QL IA: NORMAL

## 2018-08-14 ENCOUNTER — ROUTINE PRENATAL (OUTPATIENT)
Dept: OBGYN CLINIC | Facility: HOSPITAL | Age: 23
End: 2018-08-14
Payer: COMMERCIAL

## 2018-08-14 VITALS — SYSTOLIC BLOOD PRESSURE: 125 MMHG | WEIGHT: 229 LBS | DIASTOLIC BLOOD PRESSURE: 82 MMHG | BODY MASS INDEX: 36.96 KG/M2

## 2018-08-14 DIAGNOSIS — Z34.92 PRENATAL CARE IN SECOND TRIMESTER: ICD-10-CM

## 2018-08-14 DIAGNOSIS — Z3A.14 14 WEEKS GESTATION OF PREGNANCY: Primary | ICD-10-CM

## 2018-08-14 PROBLEM — Z34.90 PREGNANCY: Status: ACTIVE | Noted: 2018-08-14

## 2018-08-14 PROCEDURE — 87591 N.GONORRHOEAE DNA AMP PROB: CPT | Performed by: OBSTETRICS & GYNECOLOGY

## 2018-08-14 PROCEDURE — G0145 SCR C/V CYTO,THINLAYER,RESCR: HCPCS | Performed by: OBSTETRICS & GYNECOLOGY

## 2018-08-14 PROCEDURE — 99213 OFFICE O/P EST LOW 20 MIN: CPT | Performed by: OBSTETRICS & GYNECOLOGY

## 2018-08-14 PROCEDURE — 87491 CHLMYD TRACH DNA AMP PROBE: CPT | Performed by: OBSTETRICS & GYNECOLOGY

## 2018-08-14 NOTE — PROGRESS NOTES
Assessment  25 y o   at 14w3d presenting for prenatal H&P  Plan  Diagnoses and all orders for this visit:    14 weeks gestation of pregnancy  -     GTT 1 hour; Future    Prenatal care in second trimester  -     Liquid-based pap, screening  -     Chlamydia/GC amplified DNA by PCR      Discussed genetic screening -- desired,  Has already received referral to [facility]  -     Reviewed precautions  -     Return to office in 4 weeks    ________________________________________________________________      Subjective   Joon Meek is a 25 y o  Hamp Britany at 14w3d by LMP (18) who presents for prenatal H&P  She reports that she is feeling well  She denies nausea, vomiting, cramping or bleeding  She reports that she does feel tired at times  She works at [employer] in [employer]  She is on her feet for the majority of her 8 hour shifts  Patient is currently in a committed relationship with the FOB but notes that this pregnancy was unplanned  She has never been on any form of contraception  Prior to pregnancy she reports normal menses without any menstrual issues  She has had no vaginal bleeding since her LMP  Patient's PMH is noncontributory  Her Ob hx is significant for a SAB at ~7wga in 2018  She denies a hx of STD/STI, denies a hx of TB or close contacts with persons with TB  She denies hx of tobacco, alcohol or drug use  She denies a family history of inheritable conditions such as physical or intellectual disabilities, birth defects, blood disorders, heart or neural tube defects  She has never had MRSA  She denies recent travel or travel planned in the near future  Discussed with patient normal pregnancy weight gain during pregnancy which given her starting BMI should be between 11-20lbs  Reviewed healthy eating options as well as diet and exercise during pregnancy    Discussed the need for an early 1 hour Glucola given her risk factors which include obesity and ethnicity ()  Discussed risk associated with obesity and excessive weight gain during pregnancy including GDM, preeclampsia, shoulder dystocia, increase risk of  section and labor complications  Reviewed pregnancy expectations as well as schedule for prenatal appointments  Discussed breast feeding as well as postpartum contraception  Patient is unsure if she would like to breast feed at this time and will think about contraception closer to the time of delivery  Pregnancy Complications:  Patient Active Problem List   Diagnosis    Amenorrhea, unspecified    14 weeks gestation of pregnancy       PMH:  Past Medical History:   Diagnosis Date    Bronchitis     Miscarriage        PSH:  No past surgical history on file  Social Hx:  Social History     Social History    Marital status: Single     Spouse name: N/A    Number of children: N/A    Years of education: N/A     Occupational History    Not on file       Social History Main Topics    Smoking status: Never Smoker    Smokeless tobacco: Never Used    Alcohol use Yes      Comment: social     Drug use: No    Sexual activity: Yes     Partners: Male     Birth control/ protection: None     Other Topics Concern    Not on file     Social History Narrative    No narrative on file       OB Hx:  Obstetric History       T0      L0     SAB1   TAB0   Ectopic0   Multiple0   Live Births0       # Outcome Date GA Lbr Vishal/2nd Weight Sex Delivery Anes PTL Lv   2 Current            1 SAB 01/15/18 4w0d                 Meds:    Current Outpatient Prescriptions:     aspirin (ECOTRIN LOW STRENGTH) 81 mg EC tablet, Take 1 tablet (81 mg total) by mouth daily, Disp: 90 tablet, Rfl: 3    Prenatal Multivit-Min-Fe-FA (PRENATAL VITAMINS PO), Take by mouth, Disp: , Rfl:     Allergies:  No Known Allergies      Objective  /82   Wt 104 kg (229 lb)   LMP 2018 (Exact Date)   BMI 36 96 kg/m²      Physical Exam:  Physical Exam Constitutional: She is oriented to person, place, and time  She appears well-developed and well-nourished  No distress  Obese   HENT:   Head: Normocephalic and atraumatic  Cardiovascular: Normal rate and intact distal pulses  Pulmonary/Chest: Effort normal  No respiratory distress  She exhibits no tenderness  Abdominal: Soft  She exhibits no distension  There is no tenderness  There is no rebound and no guarding  Gravid   Genitourinary: Vaginal discharge found  Genitourinary Comments: Normal external genitalia  Cervix appears normal without lesions  Cervical mucus and small amount of white discharge noted   Musculoskeletal: Normal range of motion  Neurological: She is alert and oriented to person, place, and time  Skin: Skin is warm and dry  Psychiatric: She has a normal mood and affect  Her behavior is normal    Vitals reviewed        Prenatal panel: reviewed and notable for the following  Hb 14  Urine Cx with Labctobacillus    Pelvic Ultrasound not performed today    Keny De Leon MD, MPH  OB/GYN, PGY3  8/14/2018, 9:47 AM

## 2018-08-16 LAB
CHLAMYDIA DNA CVX QL NAA+PROBE: NORMAL
LAB AP GYN PRIMARY INTERPRETATION: NORMAL
Lab: NORMAL
N GONORRHOEA DNA GENITAL QL NAA+PROBE: NORMAL

## 2018-08-26 ENCOUNTER — HOSPITAL ENCOUNTER (EMERGENCY)
Facility: HOSPITAL | Age: 23
Discharge: HOME | End: 2018-08-26
Attending: EMERGENCY MEDICINE
Payer: COMMERCIAL

## 2018-08-26 VITALS
HEART RATE: 84 BPM | SYSTOLIC BLOOD PRESSURE: 142 MMHG | TEMPERATURE: 98.5 F | HEIGHT: 66 IN | DIASTOLIC BLOOD PRESSURE: 79 MMHG | WEIGHT: 229 LBS | RESPIRATION RATE: 18 BRPM | BODY MASS INDEX: 36.8 KG/M2 | OXYGEN SATURATION: 100 %

## 2018-08-26 DIAGNOSIS — Z34.90 PREGNANCY: ICD-10-CM

## 2018-08-26 DIAGNOSIS — Z3A.14 14 WEEKS GESTATION OF PREGNANCY: ICD-10-CM

## 2018-08-26 DIAGNOSIS — N91.2 AMENORRHEA, UNSPECIFIED: ICD-10-CM

## 2018-08-26 DIAGNOSIS — R19.7 NAUSEA VOMITING AND DIARRHEA: Primary | ICD-10-CM

## 2018-08-26 DIAGNOSIS — R11.2 NAUSEA VOMITING AND DIARRHEA: Primary | ICD-10-CM

## 2018-08-26 PROBLEM — Z3A.16 16 WEEKS GESTATION OF PREGNANCY: Status: ACTIVE | Noted: 2018-08-14

## 2018-08-26 LAB
ANION GAP SERPL CALCULATED.3IONS-SCNC: 7 MMOL/L (ref 4–13)
BASOPHILS # BLD AUTO: 0.02 THOUSANDS/ΜL (ref 0–0.1)
BASOPHILS NFR BLD AUTO: 0 % (ref 0–1)
BILIRUB UR QL STRIP: NEGATIVE
BILIRUB UR QL STRIP: NEGATIVE
BUN SERPL-MCNC: 8 MG/DL (ref 5–25)
CALCIUM SERPL-MCNC: 9.2 MG/DL (ref 8.3–10.1)
CHLORIDE SERPL-SCNC: 106 MMOL/L (ref 100–108)
CLARITY UR: CLEAR
CLARITY UR: CLEAR
CO2 SERPL-SCNC: 22 MMOL/L (ref 21–32)
COLOR UR: YELLOW
COLOR UR: YELLOW
CREAT SERPL-MCNC: 0.56 MG/DL (ref 0.6–1.3)
EOSINOPHIL # BLD AUTO: 0.04 THOUSAND/ΜL (ref 0–0.61)
EOSINOPHIL NFR BLD AUTO: 0 % (ref 0–6)
ERYTHROCYTE [DISTWIDTH] IN BLOOD BY AUTOMATED COUNT: 14 % (ref 11.6–15.1)
GFR SERPL CREATININE-BSD FRML MDRD: 134 ML/MIN/1.73SQ M
GLUCOSE SERPL-MCNC: 88 MG/DL (ref 65–140)
GLUCOSE UR STRIP-MCNC: NEGATIVE MG/DL
GLUCOSE UR STRIP-MCNC: NEGATIVE MG/DL
HCT VFR BLD AUTO: 38.4 % (ref 34.8–46.1)
HGB BLD-MCNC: 12.9 G/DL (ref 11.5–15.4)
HGB UR QL STRIP.AUTO: NEGATIVE
HGB UR QL STRIP.AUTO: NEGATIVE
IMM GRANULOCYTES # BLD AUTO: 0.05 THOUSAND/UL (ref 0–0.2)
IMM GRANULOCYTES NFR BLD AUTO: 0 % (ref 0–2)
KETONES UR STRIP-MCNC: ABNORMAL MG/DL
KETONES UR STRIP-MCNC: ABNORMAL MG/DL
LEUKOCYTE ESTERASE UR QL STRIP: NEGATIVE
LEUKOCYTE ESTERASE UR QL STRIP: NEGATIVE
LYMPHOCYTES # BLD AUTO: 1.65 THOUSANDS/ΜL (ref 0.6–4.47)
LYMPHOCYTES NFR BLD AUTO: 14 % (ref 14–44)
MCH RBC QN AUTO: 29.3 PG (ref 26.8–34.3)
MCHC RBC AUTO-ENTMCNC: 33.6 G/DL (ref 31.4–37.4)
MCV RBC AUTO: 87 FL (ref 82–98)
MONOCYTES # BLD AUTO: 0.57 THOUSAND/ΜL (ref 0.17–1.22)
MONOCYTES NFR BLD AUTO: 5 % (ref 4–12)
NEUTROPHILS # BLD AUTO: 9.89 THOUSANDS/ΜL (ref 1.85–7.62)
NEUTS SEG NFR BLD AUTO: 81 % (ref 43–75)
NITRITE UR QL STRIP: NEGATIVE
NITRITE UR QL STRIP: NEGATIVE
NRBC BLD AUTO-RTO: 0 /100 WBCS
PH UR STRIP.AUTO: 7 [PH] (ref 4.5–8)
PH UR STRIP.AUTO: 7.5 [PH] (ref 4.5–8)
PLATELET # BLD AUTO: 223 THOUSANDS/UL (ref 149–390)
PMV BLD AUTO: 11 FL (ref 8.9–12.7)
POTASSIUM SERPL-SCNC: 4.1 MMOL/L (ref 3.5–5.3)
PROT UR STRIP-MCNC: NEGATIVE MG/DL
PROT UR STRIP-MCNC: NEGATIVE MG/DL
RBC # BLD AUTO: 4.4 MILLION/UL (ref 3.81–5.12)
SODIUM SERPL-SCNC: 135 MMOL/L (ref 136–145)
SP GR UR STRIP.AUTO: 1.01 (ref 1–1.03)
SP GR UR STRIP.AUTO: 1.02 (ref 1–1.03)
UROBILINOGEN UR QL STRIP.AUTO: 0.2 E.U./DL
UROBILINOGEN UR QL STRIP.AUTO: 0.2 E.U./DL
WBC # BLD AUTO: 12.22 THOUSAND/UL (ref 4.31–10.16)

## 2018-08-26 PROCEDURE — 81003 URINALYSIS AUTO W/O SCOPE: CPT | Performed by: EMERGENCY MEDICINE

## 2018-08-26 PROCEDURE — 96374 THER/PROPH/DIAG INJ IV PUSH: CPT

## 2018-08-26 PROCEDURE — 80048 BASIC METABOLIC PNL TOTAL CA: CPT | Performed by: EMERGENCY MEDICINE

## 2018-08-26 PROCEDURE — 81003 URINALYSIS AUTO W/O SCOPE: CPT

## 2018-08-26 PROCEDURE — 36415 COLL VENOUS BLD VENIPUNCTURE: CPT | Performed by: EMERGENCY MEDICINE

## 2018-08-26 PROCEDURE — 87086 URINE CULTURE/COLONY COUNT: CPT | Performed by: EMERGENCY MEDICINE

## 2018-08-26 PROCEDURE — 85025 COMPLETE CBC W/AUTO DIFF WBC: CPT | Performed by: EMERGENCY MEDICINE

## 2018-08-26 PROCEDURE — 99284 EMERGENCY DEPT VISIT MOD MDM: CPT

## 2018-08-26 PROCEDURE — 96361 HYDRATE IV INFUSION ADD-ON: CPT

## 2018-08-26 RX ORDER — ACETAMINOPHEN 325 MG/1
650 TABLET ORAL ONCE
Status: COMPLETED | OUTPATIENT
Start: 2018-08-26 | End: 2018-08-26

## 2018-08-26 RX ORDER — ONDANSETRON 2 MG/ML
4 INJECTION INTRAMUSCULAR; INTRAVENOUS ONCE
Status: COMPLETED | OUTPATIENT
Start: 2018-08-26 | End: 2018-08-26

## 2018-08-26 RX ADMIN — ACETAMINOPHEN 650 MG: 325 TABLET, FILM COATED ORAL at 17:08

## 2018-08-26 RX ADMIN — SODIUM CHLORIDE 1000 ML: 0.9 INJECTION, SOLUTION INTRAVENOUS at 16:52

## 2018-08-26 RX ADMIN — ONDANSETRON 4 MG: 2 INJECTION INTRAMUSCULAR; INTRAVENOUS at 16:52

## 2018-08-26 NOTE — ED ATTENDING ATTESTATION
Kalpesh Gan MD, saw and evaluated the patient  I have discussed the patient with the resident/non-physician practitioner and agree with the resident's/non-physician practitioner's findings, Plan of Care, and MDM as documented in the resident's/non-physician practitioner's note, except where noted  All available labs and Radiology studies were reviewed  At this point I agree with the current assessment done in the Emergency Department  I have conducted an independent evaluation of this patient a history and physical is as follows:    OA: 24 y/o f at ~ 16 weeks gestation,  with previous miscarriage who presents with n/v and nonbloody loose stools  + abdominal cramping that resolved after BM  Pt states she has had issues with nausea throughout the pregnancy but does not normally vomit  Does not take anti-emetics at baseline  No fevers/chills  No urinary sxms  Unknown sick contacts, works as a  at the [employer]  Diffuse body aches for works  Today did develop a mild headache post vomiting  - urinary sxms, - bleeding or vaginal dc  No cp/sob  No cough  PE, well developed f in NAD, interactive and sitting upright on stretcher, VSS, NC/AT, anicteric, MMM, neck supple/FROM/-menigismus, RR, lungs CTAB, -w/r, abd soft, + gravid, NT/ND, +BS, -r/g, - CVA ttp, - LE edema, + 2 distdal pulses and capillaryrefill < 2 se,c AAO  A/p n/v in pregnancy, labs, u/a, IVF hydration, antiemetic, discuss with b/gyn  Pt workup pending at end of shift       Critical Care Time  CritCare Time    Procedures

## 2018-08-26 NOTE — CONSULTS
Consult - OB/GYN   Chris Guidry 25 y o  female MRN: [id number]  Unit/Bed#: ED 24 Encounter: [id number]    25 y o   sexually active reproductive aged female at 12w2d with nausea, emesis    She is a patient of [address] [place] [address] OB/GYN    Chief complaint:  I was throwing up earlier  HPI:  Chris Guidry is a 18yo  who initially presented to the emergency department with nausea, emesis  She reports that she started feeling nauseated upon waking this AM  She usually feels nauseated in the morning, which resolves following eating  She did not eat anything this morning  She went immediately to work, where her nausea progressed  She then experienced emesis while at work  She then ate, and had two more episodes of non-bloody emesis  While in the emergency department, she has received a single dose of 1652  She reports she is feeling much better and is tolerating her orange juice without nausea  She reports that she has an appetite and is interested in eating dinner  Her partner reports that he had an episode of emesis while at work several days ago  She reports that she is unsure if any of her coworkers are sick  Active Problems:  Patient Active Problem List   Diagnosis    Amenorrhea, unspecified    16 weeks gestation of pregnancy    Pregnancy       PMH:  Past Medical History:   Diagnosis Date    Bronchitis     Miscarriage        PSH:  History reviewed  No pertinent surgical history  Meds:  No current facility-administered medications on file prior to encounter        Current Outpatient Prescriptions on File Prior to Encounter   Medication Sig Dispense Refill    Prenatal Multivit-Min-Fe-FA (PRENATAL VITAMINS PO) Take by mouth      [DISCONTINUED] aspirin (ECOTRIN LOW STRENGTH) 81 mg EC tablet Take 1 tablet (81 mg total) by mouth daily 90 tablet 3       Allergies:  No Known Allergies    Physical Exam:  /79 (BP Location: Left arm)   Pulse 84   Temp 98 5 °F (36 9 °C) (Oral)   Resp 18   Ht 5' 6" (1 676 m)   Wt 104 kg (229 lb)   LMP 2018 (Exact Date)   SpO2 100%   BMI 36 96 kg/m²     Gen: AaOx3, NAD, pleasant  Card: RRR, no m/r/g  Pulm: CTAB  Abd: Soft, nontender, nondistended  +BS  : Fundus firm, nontender, size approximately equal to dates  Extremities: No edema, nontender        Assessment:   25 y o   sexually active reproductive aged female at 16w1d with nausea, emesis    Plan:   1  Nausea, emesis  - Resolved  - Discussed behavioral modifications to improve nausea during pregnancy, including frequent small meals, eating a small meal first thing upon awaking in the AM, avoiding foods that are difficult to digest, a BRAT + protein diet, and vitamin B6 and doxylamine supplementation  Pt agreeable  - Discussed hand washing, avoiding sick contacts, staying well hydrated in light of potential sick contacts  - Pt has routine prenatal appointment scheduled for 9/10, encouraged to keep appointment  Discussed that we are happy to have her return to clinic sooner should the need or desire arise  Pt agreeable  - Discussed calling OB/GYN on call with further questions or concerns  Pt agreeable  Discussed with Dr Emani Weinstein         [facility], DO  OB/GYN, PGY3  2018, 6:27 PM

## 2018-08-26 NOTE — DISCHARGE INSTRUCTIONS

## 2018-08-26 NOTE — ED PROVIDER NOTES
History  Chief Complaint   Patient presents with    Vomiting During Pregnancy     16 week pregnant patient reports she did not eat in time this morning and she vomited  States she also has generalized body aches for last several days, worse when she is at work  Patient is a 26-year-old female at 12 weeks gestation who is a  presents with nausea and vomiting  Patient says that she has been dealing with nausea throughout her pregnancy  Patient says that the nausea normally resolves when she eats something in the morning  However, today she did not eat anything this morning and her nausea progressed to vomiting  She says that she has had several episodes of vomiting that have been nonbloody and non bilious today at work  Patient has not taken anything for nausea throughout her pregnancy  Patient is also complaining of a mild aching headache since she started vomiting  She complains of photophobia but no phonophobia  She denies focal neurologic deficit, altered mental status  She is also complaining of 2 episodes of runny stools that started today as well  Patient says that she has had associated abdominal cramping with the diarrhea but no abdominal pain  She described a stools as nonbloody and non-melenous  She denies any vaginal complaints including vaginal bleeding or vaginal discharge  She also denies chest pain, dyspnea, abdominal pain, dysuria, hematuria  She denies sick contacts  Patient had a miscarriage during her last pregnancy at 7 weeks spontaneously  Prior to Admission Medications   Prescriptions Last Dose Informant Patient Reported? Taking? Prenatal Multivit-Min-Fe-FA (PRENATAL VITAMINS PO)  Self Yes Yes   Sig: Take by mouth      Facility-Administered Medications: None       Past Medical History:   Diagnosis Date    Bronchitis     Miscarriage        History reviewed  No pertinent surgical history      Family History   Problem Relation Age of Onset    No Known Problems Mother     Cancer Father         skin    Rheum arthritis Maternal Grandmother     No Known Problems Sister     No Known Problems Brother      I have reviewed and agree with the history as documented  Social History   Substance Use Topics    Smoking status: Never Smoker    Smokeless tobacco: Never Used    Alcohol use Yes      Comment: social         Review of Systems   Constitutional: Negative for chills, diaphoresis, fatigue and fever  HENT: Negative for facial swelling, sore throat and trouble swallowing  Respiratory: Negative for cough, chest tightness, shortness of breath and wheezing  Cardiovascular: Negative for chest pain, palpitations and leg swelling  Gastrointestinal: Positive for diarrhea, nausea and vomiting  Negative for abdominal distention, abdominal pain, anal bleeding, blood in stool, constipation and rectal pain  Genitourinary: Negative for dysuria and hematuria  Musculoskeletal: Negative for back pain, neck pain and neck stiffness  Skin: Negative for color change, pallor, rash and wound  Neurological: Positive for headaches  Negative for weakness, light-headedness and numbness  All other systems reviewed and are negative  Physical Exam  ED Triage Vitals [08/26/18 1547]   Temperature Pulse Respirations Blood Pressure SpO2   98 5 °F (36 9 °C) 93 18 138/72 99 %      Temp Source Heart Rate Source Patient Position - Orthostatic VS BP Location FiO2 (%)   Oral Monitor Sitting Right arm --      Pain Score       8           Orthostatic Vital Signs  Vitals:    08/26/18 1547 08/26/18 1754   BP: 138/72 142/79   Pulse: 93 84   Patient Position - Orthostatic VS: Sitting        Physical Exam   Constitutional: She is oriented to person, place, and time  She appears well-developed and well-nourished  No distress  HENT:   Head: Normocephalic  Eyes: Pupils are equal, round, and reactive to light  Neck: Normal range of motion  Neck supple     Cardiovascular: Normal rate, regular rhythm, normal heart sounds and intact distal pulses  Exam reveals no gallop and no friction rub  No murmur heard  Pulmonary/Chest: Effort normal and breath sounds normal  No respiratory distress  She has no wheezes  She has no rales  She exhibits no tenderness  Abdominal: Soft  Bowel sounds are normal  She exhibits no distension  There is no tenderness  There is no guarding  Abdomen is soft and obese  No tenderness throughout  No rebound tenderness or guarding  Fundus is palpated below the umbilicus  Bowel sounds regular  Musculoskeletal: Normal range of motion  She exhibits no edema, tenderness or deformity  No pitting edema bilaterally  Neurological: She is alert and oriented to person, place, and time  No cranial nerve deficit or sensory deficit  Skin: Skin is warm and dry  Capillary refill takes less than 2 seconds  Psychiatric: She has a normal mood and affect  Her behavior is normal  Judgment and thought content normal    Vitals reviewed  ED Medications  Medications   sodium chloride 0 9 % bolus 1,000 mL (1,000 mL Intravenous New Bag 8/26/18 1652)   acetaminophen (TYLENOL) tablet 650 mg (650 mg Oral Given 8/26/18 1708)   ondansetron (ZOFRAN) injection 4 mg (4 mg Intravenous Given 8/26/18 1652)       Diagnostic Studies  Results Reviewed     Procedure Component Value Units Date/Time    UA w Reflex to Microscopic w Reflex to Culture [[id number]]  (Abnormal) Collected:  08/26/18 1743    Lab Status:  Final result Specimen:  Urine from Urine, Clean Catch Updated:  08/26/18 1804     Color, UA Yellow     Clarity, UA Clear     Specific Gravity, UA 1 011     pH, UA 7 5     Leukocytes, UA Negative     Nitrite, UA Negative     Protein, UA Negative mg/dl      Glucose, UA Negative mg/dl      Ketones, UA 40 (2+) (A) mg/dl      Urobilinogen, UA 0 2 E U /dl      Bilirubin, UA Negative     Blood, UA Negative     URINE COMMENT --    Urine culture [[id number]] Collected:  08/26/18 1743    Lab Status:   In process Specimen:  Urine from Urine, Clean Catch Updated:  08/26/18 1804    ED Urine Macroscopic [[id number]]  (Abnormal) Collected:  08/26/18 1753    Lab Status:  Final result Specimen:  Urine Updated:  08/26/18 1740     Color, UA Yellow     Clarity, UA Clear     pH, UA 7 0     Leukocytes, UA Negative     Nitrite, UA Negative     Protein, UA Negative mg/dl      Glucose, UA Negative mg/dl      Ketones, UA 40 (2+) (A) mg/dl      Urobilinogen, UA 0 2 E U /dl      Bilirubin, UA Negative     Blood, UA Negative     Specific Gravity, UA 1 020    Narrative:       CLINITEK RESULT    Basic metabolic panel [[id number]]  (Abnormal) Collected:  08/26/18 1643    Lab Status:  Final result Specimen:  Blood from Hand, Right Updated:  08/26/18 1724     Sodium 135 (L) mmol/L      Potassium 4 1 mmol/L      Chloride 106 mmol/L      CO2 22 mmol/L      Anion Gap 7 mmol/L      BUN 8 mg/dL      Creatinine 0 56 (L) mg/dL      Glucose 88 mg/dL      Calcium 9 2 mg/dL      eGFR 134 ml/min/1 73sq m     Narrative:         National Kidney Disease Education Program recommendations are as follows:  GFR calculation is accurate only with a steady state creatinine  Chronic Kidney disease less than 60 ml/min/1 73 sq  meters  Kidney failure less than 15 ml/min/1 73 sq  meters      CBC and differential [[id number]]  (Abnormal) Collected:  08/26/18 1643    Lab Status:  Final result Specimen:  Blood from Hand, Right Updated:  08/26/18 1705     WBC 12 22 (H) Thousand/uL      RBC 4 40 Million/uL      Hemoglobin 12 9 g/dL      Hematocrit 38 4 %      MCV 87 fL      MCH 29 3 pg      MCHC 33 6 g/dL      RDW 14 0 %      MPV 11 0 fL      Platelets 425 Thousands/uL      nRBC 0 /100 WBCs      Neutrophils Relative 81 (H) %      Immat GRANS % 0 %      Lymphocytes Relative 14 %      Monocytes Relative 5 %      Eosinophils Relative 0 %      Basophils Relative 0 %      Neutrophils Absolute 9 89 (H) Thousands/µL      Immature Grans Absolute 0 05 Thousand/uL      Lymphocytes Absolute 1 65 Thousands/µL      Monocytes Absolute 0 57 Thousand/µL      Eosinophils Absolute 0 04 Thousand/µL      Basophils Absolute 0 02 Thousands/µL                  No orders to display         Procedures  Procedures      Phone Consults  ED Phone Contact    ED Course                               MDM  Number of Diagnoses or Management Options  14 weeks gestation of pregnancy:   Amenorrhea, unspecified:   Nausea vomiting and diarrhea: new and does not require workup  Pregnancy:   Diagnosis management comments: Patient is a 36-year-old female who is 16 weeks pregnant presents with a history of nausea, vomiting, diarrhea  Symptoms seem to be relatively mild and have only occurred over the past several hours  After giving the patient some Zofran for nausea, she got instant relief  Patient has had no more diarrhea since being here  Patient is also complaining of a headache that Tylenol relieved  On ultrasound, we confirmed an intrauterine pregnancy  The fetus is approximately 16 weeks based on intra-parietal measurements  Good fetal heart rate was observed at 154 beats per minute  Patient was advised to return to care if she develops vaginal bleeding, abdominal pain, uncontrollable vomiting  Patient was stable throughout her time here in the ED  Ob was consulted and they feel comfortable with us discharging the patient         Amount and/or Complexity of Data Reviewed  Clinical lab tests: ordered and reviewed  Tests in the radiology section of CPT®: ordered and reviewed  Tests in the medicine section of CPT®: ordered and reviewed    Risk of Complications, Morbidity, and/or Mortality  Presenting problems: moderate  Diagnostic procedures: minimal  Management options: minimal    Patient Progress  Patient progress: improved    CritCare Time    Disposition  Final diagnoses:   Amenorrhea, unspecified   14 weeks gestation of pregnancy   Pregnancy   Nausea vomiting and diarrhea     Time reflects when diagnosis was documented in both MDM as applicable and the Disposition within this note     Time User Action Codes Description Comment    8/26/2018  5:55 PM Honora Alejandra Add [N91 2] Amenorrhea, unspecified     8/26/2018  5:55 PM Honora Alejandra Modify [N91 2] Amenorrhea, unspecified     8/26/2018  5:55 PM Honora Alejandra Modify [N91 2] Amenorrhea, unspecified     8/26/2018  5:55 PM Kasey Dumont Add [Z3A 14] 14 weeks gestation of pregnancy     8/26/2018  5:55 PM Kasey Dumontick Modify [Z3A 14] 14 weeks gestation of pregnancy     8/26/2018  5:55 PM Honora Alejandra Add [R52 50] Pregnancy     8/26/2018  5:55 PM Isela Pascualre [Z34 90] Pregnancy     8/26/2018  6:24 PM ShugaSasha vilchis Rolly Add [R11 2,  R19 7] Nausea vomiting and diarrhea     8/26/2018  6:24 PM Leyla Madera Modify [N91 2] Amenorrhea, unspecified     8/26/2018  6:24 PM Sasha Johnson Modify [R11 2,  R19 7] Nausea vomiting and diarrhea       ED Disposition     ED Disposition Condition Comment    Discharge  Jim Rojelio discharge to home/self care  Condition at discharge: Stable        Follow-up Information     Follow up With Specialties Details Why [facility] Emergency Department Emergency Medicine  If you developed vaginal bleeding, abdominal pain, uncontrollable vomiting  [address] [place] [address], [address] [place] [address], [id number]          Patient's Medications   Discharge Prescriptions    No medications on file     No discharge procedures on file  ED Provider  Attending physically available and evaluated Jim Levineuente  MORRO managed the patient along with the ED Attending      Electronically Signed by         Elias Marie MD  08/26/18 8811

## 2018-08-27 LAB — BACTERIA UR CULT: NORMAL

## 2018-09-10 ENCOUNTER — APPOINTMENT (OUTPATIENT)
Dept: LAB | Facility: HOSPITAL | Age: 23
End: 2018-09-10
Payer: COMMERCIAL

## 2018-09-10 ENCOUNTER — TRANSCRIBE ORDERS (OUTPATIENT)
Dept: LAB | Facility: HOSPITAL | Age: 23
End: 2018-09-10

## 2018-09-10 ENCOUNTER — ROUTINE PRENATAL (OUTPATIENT)
Dept: OBGYN CLINIC | Facility: HOSPITAL | Age: 23
End: 2018-09-10
Payer: COMMERCIAL

## 2018-09-10 VITALS
WEIGHT: 230 LBS | HEIGHT: 66 IN | DIASTOLIC BLOOD PRESSURE: 77 MMHG | HEART RATE: 118 BPM | SYSTOLIC BLOOD PRESSURE: 138 MMHG | BODY MASS INDEX: 36.96 KG/M2

## 2018-09-10 DIAGNOSIS — Z3A.18 18 WEEKS GESTATION OF PREGNANCY: ICD-10-CM

## 2018-09-10 DIAGNOSIS — Z3A.14 14 WEEKS GESTATION OF PREGNANCY: ICD-10-CM

## 2018-09-10 DIAGNOSIS — Z34.92 PRENATAL CARE IN SECOND TRIMESTER: Primary | ICD-10-CM

## 2018-09-10 PROBLEM — Z34.90 PREGNANCY: Status: RESOLVED | Noted: 2018-08-14 | Resolved: 2018-09-10

## 2018-09-10 LAB
GLUCOSE 1H P 100 G GLC PO SERPL-MCNC: 150 MG/DL (ref 65–179)
SL AMB  POCT GLUCOSE, UA: NORMAL
SL AMB POCT ALBUMIN: NORMAL

## 2018-09-10 PROCEDURE — 99213 OFFICE O/P EST LOW 20 MIN: CPT | Performed by: NURSE PRACTITIONER

## 2018-09-10 PROCEDURE — 36415 COLL VENOUS BLD VENIPUNCTURE: CPT

## 2018-09-10 PROCEDURE — 81002 URINALYSIS NONAUTO W/O SCOPE: CPT | Performed by: NURSE PRACTITIONER

## 2018-09-10 NOTE — PATIENT INSTRUCTIONS
Pregnancy at 23 to 22 [address]:   What changes are happening in my body? Now that you are in your second trimester, you have more energy  You may also be feeling hungrier than usual  You may be gaining about ½ to 1 pound a week, and your pregnancy is beginning to show  You may need to start wearing maternity clothes  As your baby gets larger, you may have other symptoms  These may include body aches or stretch marks on your abdomen, breasts, thighs, or buttocks  How do I care for myself at this stage of my pregnancy? · Eat a variety of healthy foods  Healthy foods include fruits, vegetables, whole-grain breads, low-fat dairy foods, beans, lean meats, and fish  Drink liquids as directed  Ask how much liquid to drink each day and which liquids are best for you  Limit caffeine to less than 200 milligrams each day  Limit your intake of fish to 2 servings each week  Choose fish low in mercury such as canned light tuna, shrimp, salmon, cod, or tilapia  Do not  eat fish high in mercury such as swordfish, tilefish, sachin mackerel, and shark  · Take prenatal vitamins as directed  Your need for certain vitamins and minerals, such as folic acid, increases during pregnancy  Prenatal vitamins provide some of the extra vitamins and minerals you need  Prenatal vitamins may also help to decrease the risk of certain birth defects  · Talk to your healthcare provider about exercise  Moderate exercise can help you stay fit  Your healthcare provider will help you plan an exercise program that is safe for you during pregnancy  · Do not smoke  If you smoke, it is never too late to quit  Smoking increases your risk of a miscarriage and other health problems during your pregnancy  Smoking can cause your baby to be born too early or weigh less at birth  Ask your healthcare provider for information if you need help quitting  · Do not drink alcohol    Alcohol passes from your body to your baby through the placenta  It can affect your baby's brain development and cause fetal alcohol syndrome (FAS)  FAS is a group of conditions that causes mental, behavior, and growth problems  · Talk to your healthcare provider before you take any medicines  Many medicines may harm your baby if you take them when you are pregnant  Do not take any medicines, vitamins, herbs, or supplements without first talking to your healthcare provider  Never use illegal or street drugs (such as marijuana or cocaine) while you are pregnant  What are some safety tips during pregnancy? · Avoid hot tubs and saunas  Do not use a hot tub or sauna while you are pregnant, especially during your first trimester  Hot tubs and saunas may raise your baby's temperature and increase the risk of birth defects  · Avoid toxoplasmosis  This is an infection caused by eating raw meat or being around infected cat feces  It can cause birth defects, miscarriages, and other problems  Wash your hands after you touch raw meat  Make sure any meat is well-cooked before you eat it  Avoid raw eggs and unpasteurized milk  Use gloves or ask someone else to clean your cat's litter box while you are pregnant  What changes are happening with my baby? By 22 weeks, your baby is about 8 inches long from the top of the head to the rump (baby's bottom)  Your baby also weighs about 1 pound  Your baby is becoming much more active  You may be able to feel the baby move inside you now  The first movements may not be that noticeable  They may feel like a fluttering sensation  As time goes on, your baby's movements will become stronger and more noticeable  What do I need to know about prenatal care? During the first 28 weeks of your pregnancy, you will see your healthcare provider once a month  Your healthcare provider will check your blood pressure and weight  You may also need the following:  · A urine test  may also be done to check for sugar and protein   These can be signs of gestational diabetes or infection  Protein in your urine may also be a sign of preeclampsia  Preeclampsia is a condition that can develop during week 20 or later of your pregnancy  It causes high blood pressure, and it can cause problems with your kidneys and other organs  · Fundal height  is a measurement of your uterus to check your baby's growth  This number is usually the same as the number of weeks that you have been pregnant  · A fetal ultrasound  shows pictures of your baby inside your uterus  It shows your baby's development  The movement and position of your baby can also be seen  Your healthcare provider may be able to tell you what your baby's gender is during the ultrasound  · Your baby's heart rate  will be checked  When should I seek immediate care? · You develop a severe headache that does not go away  · You have new or increased vision changes, such as blurred or spotted vision  · You have new or increased swelling in your face or hands  · You have vaginal spotting or bleeding  · Your water broke or you feel warm water gushing or trickling from your vagina  When should I contact my healthcare provider? · You have abdominal cramps, pressure, or tightening  · You have a change in vaginal discharge  · You cannot keep food or drinks down, and you are losing weight  · You have chills or a fever  · You have vaginal itching, burning, or pain  · You have yellow, green, white, or foul-smelling vaginal discharge  · You have pain or burning when you urinate, less urine than usual, or pink or bloody urine  · You have questions or concerns about your condition or care  CARE AGREEMENT:   You have the right to help plan your care  Learn about your health condition and how it may be treated  Discuss treatment options with your caregivers to decide what care you want to receive  You always have the right to refuse treatment   The above information is an  only  It is not intended as medical advice for individual conditions or treatments  Talk to your doctor, nurse or pharmacist before following any medical regimen to see if it is safe and effective for you  © 2017 [address] [employer] Information is for End User's use only and may not be sold, redistributed or otherwise used for commercial purposes  All illustrations and images included in CareNotes® are the copyrighted property of A D [employer] M [employer]  or Adrian Jeter

## 2018-09-10 NOTE — PROGRESS NOTES
Denies loss of fluid, vaginal bleeding and abdominal pain  Confirms fetal movement, flutters  Tolerating prenatal vitamin well  Recently seen in emergency room with nausea, vomiting and abdominal pain 18- was discharged with precautions  Patient states she had part 1 of sequential screen drawn  Prenatal labs reviewed  Has appointment scheduled for level 2 ultrasound  Plan:  1  Continue prenatal vitamin daily  2  Work note provided- preprinted [employer] work restrictions addendum with 8 hr per day  3   Early 1 hr Glucola slip reprinted, patient encouraged to have labs drawn  4  Follow-up with  Center as scheduled for level 2 ultrasound  5   Common discomforts of pregnancy and precautions reviewed  RTO 4 weeks

## 2018-09-24 ENCOUNTER — APPOINTMENT (OUTPATIENT)
Dept: LAB | Facility: HOSPITAL | Age: 23
End: 2018-09-24
Attending: OBSTETRICS & GYNECOLOGY
Payer: COMMERCIAL

## 2018-09-24 ENCOUNTER — ROUTINE PRENATAL (OUTPATIENT)
Dept: PERINATAL CARE | Facility: CLINIC | Age: 23
End: 2018-09-24
Payer: COMMERCIAL

## 2018-09-24 VITALS
SYSTOLIC BLOOD PRESSURE: 109 MMHG | WEIGHT: 231.2 LBS | BODY MASS INDEX: 37.16 KG/M2 | DIASTOLIC BLOOD PRESSURE: 73 MMHG | HEART RATE: 87 BPM | HEIGHT: 66 IN

## 2018-09-24 DIAGNOSIS — Z3A.20 20 WEEKS GESTATION OF PREGNANCY: ICD-10-CM

## 2018-09-24 DIAGNOSIS — O99.212 OBESITY AFFECTING PREGNANCY IN SECOND TRIMESTER: ICD-10-CM

## 2018-09-24 DIAGNOSIS — O99.810 ABNORMAL GLUCOSE AFFECTING PREGNANCY: Primary | ICD-10-CM

## 2018-09-24 DIAGNOSIS — Z36.86 ENCOUNTER FOR ANTENATAL SCREENING FOR CERVICAL LENGTH: ICD-10-CM

## 2018-09-24 DIAGNOSIS — Z36.3 ENCOUNTER FOR ANTENATAL SCREENING FOR MALFORMATION: ICD-10-CM

## 2018-09-24 DIAGNOSIS — Z34.92 PRENATAL CARE IN SECOND TRIMESTER: ICD-10-CM

## 2018-09-24 PROCEDURE — 76811 OB US DETAILED SNGL FETUS: CPT | Performed by: OBSTETRICS & GYNECOLOGY

## 2018-09-24 PROCEDURE — 86336 INHIBIN A: CPT | Performed by: OBSTETRICS & GYNECOLOGY

## 2018-09-24 PROCEDURE — 76817 TRANSVAGINAL US OBSTETRIC: CPT | Performed by: OBSTETRICS & GYNECOLOGY

## 2018-09-24 PROCEDURE — 36415 COLL VENOUS BLD VENIPUNCTURE: CPT | Performed by: OBSTETRICS & GYNECOLOGY

## 2018-09-24 PROCEDURE — 82677 ASSAY OF ESTRIOL: CPT | Performed by: OBSTETRICS & GYNECOLOGY

## 2018-09-24 PROCEDURE — 84702 CHORIONIC GONADOTROPIN TEST: CPT | Performed by: OBSTETRICS & GYNECOLOGY

## 2018-09-24 PROCEDURE — 82105 ALPHA-FETOPROTEIN SERUM: CPT | Performed by: OBSTETRICS & GYNECOLOGY

## 2018-09-24 NOTE — PROGRESS NOTES
Here for MFM scan for anatomy  Has not had genetic screening yet  Quad screen ordered today  Increased glucola of 150  3hr ogtt ordered  Plan a 32 week US for growth due to her elevated bmi and for missed anatomy       Adalid Quan MD

## 2018-09-24 NOTE — PROGRESS NOTES
A transvaginal ultrasound was performed  Sonographer note on use of High Level Disinfection Process (Trophon) for transvaginal probe#4 used, serial [id number]    Roman Dougherty / Jeremias Pean

## 2018-09-26 LAB
2ND TRIMESTER 4 SCREEN SERPL-IMP: NORMAL
2ND TRIMESTER 4 SCREEN SERPL-IMP: NORMAL
AFP ADJ MOM SERPL: 1.15
AFP SERPL-MCNC: 51.6 NG/ML
AGE AT DELIVERY: 22.4 YR
FET TS 18 RISK FROM MAT AGE: NORMAL
FET TS 21 RISK FROM MAT AGE: 1119
GA METHOD: NORMAL
GA: 20.3 WEEKS
HCG ADJ MOM SERPL: 1.93
HCG SERPL-ACNC: NORMAL MIU/ML
IDDM PATIENT QL: NO
INHIBIN A ADJ MOM SERPL: 1.26
INHIBIN A SERPL-MCNC: 200.24 PG/ML
KARYOTYP BLD/T: NORMAL
MULTIPLE PREGNANCY: NO
NEURAL TUBE DEFECT RISK FETUS: 7603 %
SERVICE CMNT-IMP: NORMAL
TS 18 RISK FETUS: NORMAL
TS 21 RISK FETUS: 8701
U ESTRIOL ADJ MOM SERPL: 1.24
U ESTRIOL SERPL-MCNC: 2.09 NG/ML

## 2018-09-29 ENCOUNTER — HOSPITAL ENCOUNTER (EMERGENCY)
Facility: HOSPITAL | Age: 23
Discharge: HOME | End: 2018-09-29
Attending: EMERGENCY MEDICINE
Payer: COMMERCIAL

## 2018-09-29 VITALS
BODY MASS INDEX: 37.12 KG/M2 | TEMPERATURE: 97.1 F | WEIGHT: 231 LBS | SYSTOLIC BLOOD PRESSURE: 146 MMHG | RESPIRATION RATE: 18 BRPM | DIASTOLIC BLOOD PRESSURE: 84 MMHG | OXYGEN SATURATION: 98 % | HEIGHT: 66 IN | HEART RATE: 94 BPM

## 2018-09-29 DIAGNOSIS — L29.9 ITCHING: Primary | ICD-10-CM

## 2018-09-29 PROCEDURE — 99282 EMERGENCY DEPT VISIT SF MDM: CPT

## 2018-09-29 RX ORDER — DIPHENHYDRAMINE HYDROCHLORIDE, ZINC ACETATE 2; .1 G/100G; G/100G
CREAM TOPICAL 3 TIMES DAILY PRN
Status: DISCONTINUED | OUTPATIENT
Start: 2018-09-29 | End: 2018-09-29 | Stop reason: HOSPADM

## 2018-09-29 RX ORDER — PERMETHRIN 50 MG/G
CREAM TOPICAL
Qty: 60 G | Refills: 0 | Status: SHIPPED | OUTPATIENT
Start: 2018-09-29 | End: 2018-11-05

## 2018-09-29 NOTE — ED PROVIDER NOTES
History  Chief Complaint   Patient presents with    Rash     Pt reports her mother is a CNA that was taking care of a pt with scabies  Pt reports pt's mother, sister, and step father also contracted scabies after this  Pt states "I'm itching, and I'm worried I have scabies "      50-year-old female greater than 20 weeks pregnant presents to the emergency department with complaint of lower extremity itching  Patient states the itching has been ongoing for 2 days and is presenting because her mother and multiple family members have been diagnosed with scabies recently  Patient denies any systemic symptoms she denies seeing a track marks from scabies she has never had scabies before she feels otherwise well  She then tried a or oral for her itching remaining ROS negative  History provided by:  Patient   used: No    Rash   Location:  Leg  Leg rash location:  L leg  Quality: itchiness and redness    Severity:  Mild  Onset quality:  Sudden  Timing:  Constant  Progression:  Unchanged  Chronicity:  New  Context: pregnancy    Context: not insect bite/sting and not medications    Relieved by:  None tried  Worsened by:  Nothing  Ineffective treatments:  None tried  Associated symptoms: no abdominal pain, no diarrhea, no fever, no joint pain, no myalgias, no nausea, no shortness of breath, no sore throat, not vomiting and not wheezing        Prior to Admission Medications   Prescriptions Last Dose Informant Patient Reported? Taking? Prenatal Multivit-Min-Fe-FA (PRENATAL VITAMINS PO)  Self Yes Yes   Sig: Take by mouth      Facility-Administered Medications: None       Past Medical History:   Diagnosis Date    Bronchitis        History reviewed  No pertinent surgical history      Family History   Problem Relation Age of Onset    No Known Problems Mother     Cancer Father         skin    Rheum arthritis Maternal Grandmother     No Known Problems Sister     No Known Problems Brother      I have reviewed and agree with the history as documented  Social History   Substance Use Topics    Smoking status: Never Smoker    Smokeless tobacco: Never Used    Alcohol use Yes      Comment: social         Review of Systems   Constitutional: Negative for chills and fever  HENT: Negative for sore throat  Eyes: Negative for visual disturbance  Respiratory: Negative for chest tightness, shortness of breath and wheezing  Cardiovascular: Negative for chest pain  Gastrointestinal: Negative for abdominal pain, constipation, diarrhea, nausea and vomiting  Genitourinary: Negative for dysuria and hematuria  Musculoskeletal: Negative for arthralgias and myalgias  Skin: Positive for rash  Negative for color change  Neurological: Negative for light-headedness  Hematological: Negative for adenopathy  Psychiatric/Behavioral: Negative for agitation and behavioral problems  All other systems reviewed and are negative  Physical Exam  ED Triage Vitals [09/29/18 1537]   Temperature Pulse Respirations Blood Pressure SpO2   (!) 97 1 °F (36 2 °C) 94 18 146/84 98 %      Temp Source Heart Rate Source Patient Position - Orthostatic VS BP Location FiO2 (%)   Tympanic Monitor Sitting Left arm --      Pain Score       No Pain           Orthostatic Vital Signs  Vitals:    09/29/18 1537   BP: 146/84   Pulse: 94   Patient Position - Orthostatic VS: Sitting       Physical Exam   Constitutional: She is oriented to person, place, and time  She appears well-developed and well-nourished  No distress  HENT:   Head: Normocephalic and atraumatic  Eyes: Conjunctivae and EOM are normal  No scleral icterus  Neck: Normal range of motion  Neck supple  Cardiovascular: Normal rate, regular rhythm and normal heart sounds  No murmur heard  Pulmonary/Chest: Effort normal and breath sounds normal  No respiratory distress  Abdominal: Soft  Bowel sounds are normal  There is no tenderness     Musculoskeletal: Normal range of motion  Neurological: She is alert and oriented to person, place, and time  Skin: Skin is warm and dry  Rash noted  Psychiatric: She has a normal mood and affect  Her behavior is normal    Nursing note and vitals reviewed  ED Medications  Medications - No data to display    Diagnostic Studies  Results Reviewed     None                 No orders to display         Procedures  Procedures      Phone Consults  ED Phone Contact    ED Course                               MDM  Number of Diagnoses or Management Options  Itching: new and does not require workup  Diagnosis management comments: 32-year-old female presenting with 2 small punctate lesions which were pruritic in nature on her left lower extremity and an area on her right lower extremity  Patient does not clinically appear to have scabies however will write a prescription for permethrin cream and have her started if her symptoms do not improve within a week  Gave patient had Benadryl cream and antrum while in the emergency department  Amount and/or Complexity of Data Reviewed  Review and summarize past medical records: yes      CritCare Time    Disposition  Final diagnoses:   Itching     Time reflects when diagnosis was documented in both MDM as applicable and the Disposition within this note     Time User Action Codes Description Comment    9/29/2018  4:01 PM Dav Code Add [L29 9] Itching       ED Disposition     ED Disposition Condition Comment    Discharge  Jim Serrato discharge to home/self care      Condition at discharge: Stable        Follow-up Information     Follow up With Specialties Details Why Contact Info Additional [address] [place] [address],[address]    [phone]       [address] [place] [address] Emergency Department Emergency Medicine   [address] [place] [address], [address] [place] [address], [id number]          Discharge Medication List as of 9/29/2018  4:03 PM      START taking these medications    Details   permethrin (ELIMITE) 5 % cream Apply to affected area once per day  DO NOT start taking unless itching persists for over 1 week, Print         CONTINUE these medications which have NOT CHANGED    Details   Prenatal Multivit-Min-Fe-FA (PRENATAL VITAMINS PO) Take by mouth, Historical Med           No discharge procedures on file  ED Provider  Attending physically available and evaluated Jim Serrato I managed the patient along with the ED Attending      Electronically Signed by         Linh Rendon MD  09/29/18 3247

## 2018-09-29 NOTE — ED ATTENDING ATTESTATION
Sebastian Morales MD, saw and evaluated the patient  I have discussed the patient with the resident/non-physician practitioner and agree with the resident's/non-physician practitioner's findings, Plan of Care, and MDM as documented in the resident's/non-physician practitioner's note, except where noted  All available labs and Radiology studies were reviewed  At this point I agree with the current assessment done in the Emergency Department  I have conducted an independent evaluation of this patient a history and physical is as follows:  Pt has had 2 day history of itching on lower ext Pt has 2 bug bites on lower legs Pt states mother has scabies  And she is concerned that she also does  She has not noticed any other lesions on her body whatsoever  There is no fever no chills are no other rash  Patient is approximately 20 weeks pregnant PE alert NAD there are no other lesions of than 2 small bug bites in the left lateral aspect of her leg  MDM:  Will give her topical Benadryl for itch and if symptoms persist or worsening rash on will provide her worth with permethrin to use      Critical Care Time  CritCare Time    Procedures

## 2018-10-03 ENCOUNTER — TELEPHONE (OUTPATIENT)
Dept: PERINATAL CARE | Facility: CLINIC | Age: 23
End: 2018-10-03

## 2018-10-03 NOTE — TELEPHONE ENCOUNTER
----- Message from Thomas Obrien MD sent at 9/26/2018 11:38 PM EDT -----  Please call with normal results      Thomas Obrien MD

## 2018-10-08 ENCOUNTER — ROUTINE PRENATAL (OUTPATIENT)
Dept: OBGYN CLINIC | Facility: HOSPITAL | Age: 23
End: 2018-10-08
Payer: COMMERCIAL

## 2018-10-08 VITALS
WEIGHT: 233 LBS | HEART RATE: 106 BPM | SYSTOLIC BLOOD PRESSURE: 120 MMHG | DIASTOLIC BLOOD PRESSURE: 80 MMHG | BODY MASS INDEX: 37.45 KG/M2 | HEIGHT: 66 IN

## 2018-10-08 DIAGNOSIS — Z23 NEED FOR INFLUENZA VACCINATION: Primary | ICD-10-CM

## 2018-10-08 PROBLEM — Z3A.22 22 WEEKS GESTATION OF PREGNANCY: Status: ACTIVE | Noted: 2018-08-14

## 2018-10-08 PROCEDURE — 99213 OFFICE O/P EST LOW 20 MIN: CPT | Performed by: OBSTETRICS & GYNECOLOGY

## 2018-10-08 PROCEDURE — 90686 IIV4 VACC NO PRSV 0.5 ML IM: CPT | Performed by: OBSTETRICS & GYNECOLOGY

## 2018-10-08 PROCEDURE — 90471 IMMUNIZATION ADMIN: CPT | Performed by: OBSTETRICS & GYNECOLOGY

## 2018-10-08 NOTE — PROGRESS NOTES
OB/GYN  PN Visit  Shadi Renner  [id number]  10/8/2018  8:29 AM  Dr Walt Collins MD    S: 26 y o  [id number] 22w2d here for PN visit  She has a cold, but otherwise she is doing well  She agrees to get the flu shot today  OB complaints: none    Ctxns: none   Leakage: none  Bleeding: none  Fetal movement: none      O:  Vitals:    10/08/18 0800   BP: 120/80   Pulse: (!) 106        Gen: no acute distress, nonlabored breathing  OB exam completed:    - Fundal height: 21cm   - FHT: 162     LEVEL II   Transvaginal     IMPRESSION      Carter IUP   20 weeks and 4 days by this ultrasound  (JASKARAN=2019)   Breech presentation   Fetal growth appeared normal   Regular fetal heart rate of 150 bpm   Anterior placenta   No placenta previa      GENERAL COMMENT      Here for MFM scan for anatomy  Has not had genetic screening yet  Quad   screen ordered today  Increased glucola of 150  3hr ogtt ordered  Plan a   32 week US for growth due to her elevated bmi and for missed anatomy  A/P:  #1  22w2d GESTATION   - Patient reminded to go for 3hour GTT   - RTC in 4 weeks   - Flu vaccine administered today     #2  Scabies   - Patient was recently seen in the ED for scabies, which is now resolved         Future Appointments  Date Time Provider David Powers   10/8/2018 8:30 AM Walt Collins MD [facility] [address]   2018 8:30 AM   [address]       Walt Collins MD  10/8/2018  8:29 AM

## 2018-11-05 ENCOUNTER — ROUTINE PRENATAL (OUTPATIENT)
Dept: OBGYN CLINIC | Facility: HOSPITAL | Age: 23
End: 2018-11-05
Payer: COMMERCIAL

## 2018-11-05 VITALS
HEIGHT: 66 IN | BODY MASS INDEX: 37.7 KG/M2 | HEART RATE: 76 BPM | SYSTOLIC BLOOD PRESSURE: 116 MMHG | DIASTOLIC BLOOD PRESSURE: 77 MMHG | WEIGHT: 234.6 LBS

## 2018-11-05 DIAGNOSIS — Z34.92 PRENATAL CARE IN SECOND TRIMESTER: ICD-10-CM

## 2018-11-05 DIAGNOSIS — Z3A.26 26 WEEKS GESTATION OF PREGNANCY: Primary | ICD-10-CM

## 2018-11-05 DIAGNOSIS — R73.09 ABNORMAL GTT (GLUCOSE TOLERANCE TEST): ICD-10-CM

## 2018-11-05 PROCEDURE — 99213 OFFICE O/P EST LOW 20 MIN: CPT | Performed by: NURSE PRACTITIONER

## 2018-11-05 NOTE — PATIENT INSTRUCTIONS
El embarazo de la semana 27 a la 30   LO QUE NECESITA SABER:   ¿Qué cambios están ocurriendo en mi cuerpo? Usted podría notar síntomas nuevos norm falta de [place] o inflamación de steven tobillos y pies  Es posible que también tenga dificultad para dormir o contracciones  ¿Cómo me swati cuidar en esta etapa de mi embarazo? · Consuma alimentos saludables y variados  Alimentos saludables incluyen frutas, verduras, panes de isidro integral, alimentos lácteos bajos en grasa, frijoles, alena magras y pescado  [place] líquidos norm se le haya indicado  Pregunte cuánto líquido debe kathie cada día y cuáles líquidos son los más adecuados para usted  Limite el consumo de cafeína a menos de [address]  Limite el consumo de pescado a 2 porciones cada semana  Escoja pescado con concentraciones bajas de harvey norm atún al natural enlatado, camarón, salmón, bacalao o tilapia  No  coma pescado con concentraciones altas de harvey norm pez denis, caballa gigante, pargo rayado y tiburón  · Controle la acidez  comiendo 4 o 5 comidas pequeñas cada día en vez de comidas grandes  Evite los alimentos picantes  · Controle la inflamación  al The   [employer] y poner los pies en alto o elevados sobre [place]  · [address] [place]  Tucker necesidad de ciertas vitaminas y [facility], norm el ácido fólico, aumenta kevin el [facility]  Las vitaminas prenatales proporcionan algunas de las vitaminas y minerales adicionales que usted necesita  Las vitaminas prenatales también podrían ayudar a disminuir el riesgo de ciertos defectos de nacimiento  · Consulte con tucker médico acerca de hacer ejercicio  El ejercicio moderado puede ayudarla a mantenerse en forma  Tucker médico la ayudará a planear un programa de ejercicios que sea seguro para usted kevin tucker [place]  · No fume  Si usted fuma, nunca es demasiado tarde para dejar de hacerlo   Fumar aumenta el riesgo de aborto espontáneo y otros problemas de daniel kevin tucker embarazo  Fumar puede causar que tucker bebé nazca antes de tiempo o que pese menos al nacer  Solicite información a tucker médico si usted necesita ayuda para dejar de fumar  · No consuma alcohol  El alcohol pasa de tucker cuerpo al bebé a través de la placenta  Puede afectar el desarrollo del cerebro de tucker bebé y provocar el síndrome de alcoholismo fetal (SAF)  FAS es un margoth de condiciones que causan [address], de comportamiento y de crecimiento  · Consulte con tucker médico antes de kathie cualquier medicamento  Muchos medicamentos pueden perjudicar a tucker bebé si usted los sky [facility] [address]  No tome ningún medicamento, vitaminas, hierbas o suplementos sin sarah consultar con tucker [place] [employer]  use drogas ilegales o de la sanchez (norm marihuana o cocaína) mientras está embarazada  ¿Cuáles son Lindsay Graces consejos de seguridad kevin el embarazo? · Evite jacuzzis y saunas  No use un jacuzzi o un sauna mientras usted está embarazada, especialmente kevin el primer trimestre  Los [facility] y los saunas aumentan la temperatura de tucker bebé y el riesgo de defectos de nacimiento  · Evite la toxoplasmosis  [place] es deyvi infección causada por comer carne cruda o estar cerca del excremento de un pee infectado  [place] puede causar malformaciones congénitas, aborto espontáneo y Geo Schein  Lávese las yemi después de tocar carne cruda  Asegúrese de que la carne esté franklin cocida antes de comerla  Evite los huevos crudos y la Sandra Manuel  Use guantes o pida que alguien la ayude a limpiar la caja de arena del pee mientras usted [address] [place]  ¿Qué cambios están ocurriendo con mi bebé? Para las 30 semanas, tucker bebé podría pesar más de 3 libras  Tucker bebé podría medir alrededor de 11 pulgadas de chapin desde la punta de la nilton hasta la rabadilla (parte inferior del bebé)  Ahora tucker bebé puede abrir y cerrar steven ojos   Las patadas y movimientos de tucker bebé son [place] dennis en naye momento  ¿Qué necesito saber acerca del cuidado prenatal?  Tucker médico le revisará tucker presión arterial y [place]  Es posible que también necesite lo siguiente:  · Los análisis de siobhan:  podrían realizarse para revisar signos de anemia o el tipo de [place]  · Un examen de orina  también podría realizarse para revisarle el azúcar y la proteína  Estas son señales de diabetes gestacional o de infección  La proteína en tucker orina también podría ser deyvi señal de preeclampsia  La preeclampsia es deyvi condición que puede desarrollarse kevin la semana 21 o después en tucker embarazo  Esta provoca presión arterial dalila y [employer] con steven riñones y [place]  · Deyvi vacuna contra difteria, tétanos y tos ferina y vacuna contra la gripe  podría ser recomendado por tucker médico      · La detección de diabetes gestacional  se realizará usando deyvi prueba oral de tolerancia a la glucosa  Deyvi prueba oral de tolerancia a la glucosa comienza con deyvi revisión de los niveles de azúcar en la siobhan después de que usted no haya comido por 8 horas  Después se le da deyvi bebida de glucosa  Le revisan el nivel de azúcar en la siobhan después de 1 hora, 2 horas y algunas veces 3 horas  Los médicos analizarán si el nivel de azúcar en la siobhan aumenta después del primer análisis  · La altura uterina  es deyvi medición del útero para controlar el desarrollo de tucker bebé  [employer] por lo general es igual al número de [address] que usted tiene de embarazo  Tucker médico también podría revisar la posición de tucker bebé  · El ritmo cardíaco de tucker bebé  será revisado  ¿Cuándo swati buscar atención inmediata? · Usted presenta un kinga dolor de nilton que no desaparece  · Usted tiene cambios en la visión nuevos o en aumento, norm visión borrosa o con manchas  · Usted tiene inflamación nueva o creciente en tucker su[place] o yemi      · Usted tiene manchado o sangrado vaginal     · Usted rompe willian o siente un chorro o gotas de agua tibia que le está bajando por harkins vagina  ¿Cuándo swati comunicarme con mi médico?   · Usted tiene más de 5 contracciones en 1 hora  · Usted nota algún cambio en los movimientos de harkins bebé  · Usted tiene calambres, presión o tensión abdominal     · Usted tiene un cambio en la secreción vaginal     · Usted tiene escalofríos o fiebre  · Usted tiene comezón, ardor o dolor vaginal      · Usted tiene deyvi secreción vaginal amarillenta, verdosa, tyler o de [employer]  · Usted tiene dolor o ardor al Sukh German, orina menos de lo habitual o tiene [place] rosada o sanguinolenta  · Usted tiene preguntas o inquietudes acerca de harkins condición o cuidado  ACUERDOS SOBRE HARKINS CUIDADO:   Usted tiene el derecho de ayudar a planear harkins cuidado  Aprenda todo lo que pueda sobre harkins condición y norm darle tratamiento  Discuta steven opciones de tratamiento con steven médicos para decidir el cuidado que usted desea recibir  Usted siempre tiene el derecho de rechazar el tratamiento  Esta información es sólo para uso en educación  Harkins intención no es darle un consejo médico sobre enfermedades o tratamientos  Colsulte con harkins Leyla Chivourdon farmacéutico antes de seguir cualquier régimen médico para saber si es seguro y efectivo para usted  © 2017 [address] Information is for End User's use only and may not be sold, redistributed or otherwise used for commercial purposes  All illustrations and images included in CareNotes® are the copyrighted property of A D A M , Inc  or Adrian Jeter

## 2018-11-05 NOTE — PROGRESS NOTES
Denies loss of fluid, vaginal bleeding and abdominal pain  Confirms frequent fetal movement  Tolerating prenatal vitamin well  Patient seen in the emergency room possible diagnosis of scabies  Patient had did not start permethrin, itching resolved spontaneously  No concerns today  Plan: 1  Continue prenatal vitamin daily  2 28 week labs ordered  3 hr fasting GTT ordered, abnormal 1 hr early Glucola-150 did not complete early 3 hour GTT  Blood type is AB positive, does not need RhoGAM  3  Birth plan provided-will review at next visit  Patient encouraged to sign up for prenatal classes  4  Follow up with  Center 32 week growth scan 18  5   Common discomforts of pregnancy and precautions including  labor reviewed  RTO 23 weeks f/u labs, birth plan and offer Tdap

## 2018-11-11 ENCOUNTER — HOSPITAL ENCOUNTER (OUTPATIENT)
Facility: HOSPITAL | Age: 23
Discharge: HOME | End: 2018-11-11
Attending: OBSTETRICS & GYNECOLOGY | Admitting: OBSTETRICS & GYNECOLOGY
Payer: COMMERCIAL

## 2018-11-11 VITALS
SYSTOLIC BLOOD PRESSURE: 119 MMHG | HEIGHT: 66 IN | HEART RATE: 79 BPM | RESPIRATION RATE: 18 BRPM | WEIGHT: 234 LBS | DIASTOLIC BLOOD PRESSURE: 65 MMHG | TEMPERATURE: 97.9 F | BODY MASS INDEX: 37.61 KG/M2

## 2018-11-11 LAB
ALBUMIN SERPL BCP-MCNC: 3 G/DL (ref 3.5–5)
ALP SERPL-CCNC: 106 U/L (ref 46–116)
ALT SERPL W P-5'-P-CCNC: 18 U/L (ref 12–78)
AST SERPL W P-5'-P-CCNC: 11 U/L (ref 5–45)
BILIRUB DIRECT SERPL-MCNC: 0.1 MG/DL (ref 0–0.2)
BILIRUB SERPL-MCNC: 0.26 MG/DL (ref 0.2–1)
PROT SERPL-MCNC: 6.8 G/DL (ref 6.4–8.2)

## 2018-11-11 PROCEDURE — 99214 OFFICE O/P EST MOD 30 MIN: CPT

## 2018-11-11 PROCEDURE — 76817 TRANSVAGINAL US OBSTETRIC: CPT | Performed by: OBSTETRICS & GYNECOLOGY

## 2018-11-11 PROCEDURE — 80076 HEPATIC FUNCTION PANEL: CPT | Performed by: OBSTETRICS & GYNECOLOGY

## 2018-11-11 NOTE — DISCHARGE INSTRUCTIONS
Round Ligament Pain   WHAT YOU NEED TO KNOW:   What is round ligament pain? Round ligament pain is caused when ligaments are stretched as your uterus (womb) gets bigger during pregnancy  Round ligaments are found on each side of your uterus  They are bands of tissue that hold the uterus in place  Round ligament pain happens most often during the second trimester  It is a normal part of pregnancy and should stop by the third trimester  The pain is not serious and will not hurt your baby  What are the signs and symptoms of round ligament pain? · Pain on one or both sides of your lower abdomen or groin that may move up to your hip    · Spasms in the muscles in your abdomen    · Pain that lasts a few seconds    · Pain that happens when you exercise, sneeze, change positions, or stand quickly  How is round ligament pain diagnosed? Your healthcare provider will examine you and ask about your pain  Tell the provider when the pain started, and if you feel it on one or both sides  Your provider may ask if anything helps the pain or makes it worse  What can I do to manage my pain? Round ligament pain does not need to be treated  The following may help make you more comfortable:  · Rest as often as you can  Rest can help relieve round ligament pain  You might want to lie on the side that has pain  Place a pillow under your abdomen  Keep another pillow between your knees  · Move slowly  Sudden movement can stretch the ligaments and cause pain  Stand, sit, and change positions slowly  Try to tighten the muscles in your hips before you sneeze or laugh  You can also sit down and bring your knees up toward your abdomen  This can help relieve tension on the ligaments  · Exercise as directed  Gentle exercise can keep the ligaments loose and strengthen core (abdominal) muscles  An example is swimming, or a yoga program designed for pregnancy   Ask your healthcare provider which exercises are safe for you and how often to exercise  For most healthy women, a good goal is to try to get at least 30 minutes of exercise every day  If activity causes pain, try not to walk too long or too far at one time  Break your exercise up into short amounts  · Apply a warm compress to the area  Warmth can relieve pain and muscle spasms  Ask your healthcare provider if you can take a warm bath or use a heating pad  Keep all heat settings low  High heat can be dangerous for your baby  Do not sit in a hot tub or use hot water in your bath  You may also be able to massage the area gently while you are applying heat  Massage can help relieve pain  · Ask about pain medicines  Ask your healthcare provider before you take any medicine during pregnancy, including over-the-counter pain medicines  Your healthcare provider may recommend acetaminophen to relieve the pain  Ask how much to take and how often to take it  Follow directions  Acetaminophen can cause liver damage  Too much medicine can be harmful to your baby  When should I contact my healthcare provider? · You have pain that is spreading to other parts of your body  · You have new or worsening pain  · You have pain that lasts longer than a few minutes at a time  · You have questions or concerns about your condition or care  CARE AGREEMENT:   You have the right to help plan your care  Learn about your health condition and how it may be treated  Discuss treatment options with your caregivers to decide what care you want to receive  You always have the right to refuse treatment  The above information is an  only  It is not intended as medical advice for individual conditions or treatments  Talk to your doctor, nurse or pharmacist before following any medical regimen to see if it is safe and effective for you    © 2017 Miguel0 Alvaro Escalera Information is for End User's use only and may not be sold, redistributed or otherwise used for commercial purposes  All illustrations and images included in CareNotes® are the copyrighted property of A D A M , Inc  or Adrian Jeter

## 2018-11-11 NOTE — PROGRESS NOTES
L&D Triage Note - OB/GYN  Sanjay Harden 26 y o  female MRN: [id number]  Unit/Bed#: LD Triage  Encounter: [id number]      Assessment:  26 y o   at 27w1d who presents with abdominal pain  Plan:  1  Rule out  labor    - TVUS: 27 - 29mm   - KOH/NSS negative    2  Abdominal pain    - LFTs WNL     3  Discharge home with precautions  Patient seen and examined with Dr Lalita Portillo  Management discussed with Dr Marcos Ramos  ______________________________________________________________________      Chief Compliant: abdominal pain     TIME: 13:39  Subjective: Sanjay Harden is a 26 y o   at 27w1d who presents with abdominal pain  The pain woke her from sleep this morning at 6-7am  It was sharp, but tolerable  She went to work, where the pain increased and intensified throughout the day  She had post-prandial emesis  She rates her pain as a 7-8/10  She states that the pain comes and goes and lasts 2-3 minutes  It is focused in her right upper quadrant  Pregnancy complications: none    Leakage of fluid: none  Fetal movement: yes  Vaginal bleeding: none  Contractions: none    Objective:  Vitals:    18 1349   BP: 119/65   Pulse: 79   Resp: 18   Temp: 97 9 °F (36 6 °C)       Physical Exam   Gen: no acute distress, A&Ox3  Abdomen: non-tender to deep palpation, gravid, obese  FHT:  140 / Moderate 6 - 25 bpm / accelerations, no decelerations  Scissors: none  TVUS: 27-29mm    Total Bilirubin 0 20 - 1 00 mg/dL 0 26     Bilirubin, Direct 0 00 - 0 20 mg/dL 0 10     Alkaline Phosphatase 46 - 116 U/L 106     AST 5 - 45 U/L 11     Comment:    Specimen collection should occur prior to Sulfasalazine and/or Sulfapyridine administration due to the potential for falsely depressed results  ALT 12 - 78 U/L 18     Comment:    Specimen collection should occur prior to Sulfasalazine and/or Sulfapyridine administration due to the potential for falsely depressed results      Total Protein 6 4 - 8 2 g/dL 6 8 Albumin 3 5 - 5 0 g/dL 3 0   L            Chitra Valladares MD 11/11/2018 2:58 PM

## 2018-11-11 NOTE — PROCEDURES
Loretta Anthony, a  at 27w1d with an JASKARAN of 2019, by Last Menstrual Period, was seen at [facility] [place] [facility] for the following procedure(s): $Procedure Type: US - Transvaginal]                   Ultrasound Other  Fetal Presentation: Vertex  Cervical Length: 27  Funnel: No  Placenta Previa: No  Vasa Previa: No

## 2018-11-12 ENCOUNTER — TELEPHONE (OUTPATIENT)
Dept: OBGYN CLINIC | Facility: HOSPITAL | Age: 23
End: 2018-11-12

## 2018-11-12 NOTE — TELEPHONE ENCOUNTER
----- Message from Tomasz Mcclure RN sent at 11/12/2018 10:00 AM EST -----  Please call and have pt come in for triage f/u  Thank you    Tomasz Mcclure RN    ----- Message -----  From: Christian Bingham MD  Sent: 11/12/2018   9:39 AM  To: Tomasz Mcclure RN    Yes, triage follow up      ----- Message -----  From: Tomasz Mcclure RN  Sent: 11/12/2018   8:14 AM  To: Christian Bingham MD    What are we bringing her in for? Follow up from triage? Tomasz Mcclure RN    ----- Message -----  From: Christian Bingham MD  Sent: 11/11/2018   4:02 PM  To: DONA Coe,    Can we move up this patient's appointment to this week?     Thanks,    Liliana Frederick

## 2018-11-12 NOTE — TELEPHONE ENCOUNTER
----- Message from Quita Alexander RN sent at 11/12/2018 10:00 AM EST -----  Please call and have pt come in for triage f/u  Thank you    Quita Alexander RN    ----- Message -----  From: Tacho Fish MD  Sent: 11/12/2018   9:39 AM  To: Quita Alexander RN    Yes, triage follow up      ----- Message -----  From: Quita Alexander RN  Sent: 11/12/2018   8:14 AM  To: Tacho Fish MD    What are we bringing her in for? Follow up from triage? Quita Alexander RN    ----- Message -----  From: Tacho Fish MD  Sent: 11/11/2018   4:02 PM  To: DONA Tsai,    Can we move up this patient's appointment to this week?     Thanks,    Negar Clemens

## 2018-11-13 ENCOUNTER — ROUTINE PRENATAL (OUTPATIENT)
Dept: OBGYN CLINIC | Facility: HOSPITAL | Age: 23
End: 2018-11-13
Payer: COMMERCIAL

## 2018-11-13 VITALS — WEIGHT: 234 LBS | SYSTOLIC BLOOD PRESSURE: 120 MMHG | DIASTOLIC BLOOD PRESSURE: 81 MMHG | BODY MASS INDEX: 37.77 KG/M2

## 2018-11-13 DIAGNOSIS — Z23 NEED FOR TDAP VACCINATION: Primary | ICD-10-CM

## 2018-11-13 PROCEDURE — 90715 TDAP VACCINE 7 YRS/> IM: CPT | Performed by: OBSTETRICS & GYNECOLOGY

## 2018-11-13 PROCEDURE — 90471 IMMUNIZATION ADMIN: CPT | Performed by: OBSTETRICS & GYNECOLOGY

## 2018-11-13 PROCEDURE — 99213 OFFICE O/P EST LOW 20 MIN: CPT | Performed by: OBSTETRICS & GYNECOLOGY

## 2018-11-13 NOTE — LETTER
November 13, 2018     Patient: Merlynnas Pereira   YOB: 1995   Date of Visit: 11/13/2018       Merlyn Staff  1995  [address]  [id number]    Dear Merlyn Pereira,      11/13/18        Your employee is a patient at the [facility], [address]  We ask that all pregnant women be given the following  It is mandatory that all pregnant women have:    1  Have a well-ventilated workspace  2  Wear low-heeled shoes  3  Work no more than 40 hours per week  4  Have a 15 minute break every 2 hours and at least 30 minutes for a meal break  5  Use good body mechanics by bending at your knees to avoid back strain and lift no more than 20 pounds without assistance  6  Have ready access to bathrooms and water  She may continue to work until her due date unless medical complications arise  We anticipate she may return to work in 6-8 weeks after delivery       Sincerely,    Callie Alexander MD           Sincerely,

## 2018-11-13 NOTE — PROGRESS NOTES
OB/GYN  PN Visit  Delicia Villar  [id number]  2018  11:29 AM  Dr Blaire Jenkins MD    S: 26 y o   27w3d here for PN visit  OB complaints:  Ctxns: no  Leakage: no  Bleeding: no  Fetal movement: yes    Patient was evaluated in triage over the weekend due to RUQ pain that radiated to suprapubic region  Patient also endorsed a headache at that time  Pre-term labor work-up was negative at that time  Patient states that she has had a headache ever since she was evaluated in triage  She states that she took tylenol one time and has not tried it again  Patient also thinks that all of this started because she has to stand at work a lot and would like a note saying that she can't stand at work  Discussed with patient that there are no indications at this time that would prevent her from standing at work  O:  Vitals:    18 1113   BP: 120/81       Gen: no acute distress, nonlabored breathing  OB exam completed: fundal height, FHTs, ultrasound if indicated  FHT: 145 bpm  Fundal height: 31 cm, limited due to body habitus    A/P:  #1  27w3d GESTATION  Labor precautions reviewed  Fetal kick counts reviewed  Tdap given today, patient has already received Flu vaccine  Appointment at  for growth scan at 32 weeks  RTC in 2 weeks    #2   Headache  Patient to try tylenol again for pain control  If headache does not resolve in the next few days, can do a trial of Fioricet   Encouraged adequate hydration      Future Appointments  Date Time Provider David Powers   2018 8:30 AM   [address]       Blaire Jenkins MD  2018  11:29 AM

## 2018-11-26 ENCOUNTER — ROUTINE PRENATAL (OUTPATIENT)
Dept: OBGYN CLINIC | Facility: HOSPITAL | Age: 23
End: 2018-11-26
Payer: COMMERCIAL

## 2018-11-26 VITALS — DIASTOLIC BLOOD PRESSURE: 67 MMHG | BODY MASS INDEX: 37.93 KG/M2 | WEIGHT: 235 LBS | SYSTOLIC BLOOD PRESSURE: 108 MMHG

## 2018-11-26 DIAGNOSIS — Z3A.29 29 WEEKS GESTATION OF PREGNANCY: Primary | ICD-10-CM

## 2018-11-26 PROCEDURE — 99213 OFFICE O/P EST LOW 20 MIN: CPT | Performed by: OBSTETRICS & GYNECOLOGY

## 2018-11-26 NOTE — PROGRESS NOTES
Klaus Leal is a 26 y o   at 29w2d presenting for a routine prenatal appointment  She reports that she is doing well and feeling well  She reports that she has no further headaches since her previous prenatal appointment  Endorses good fetal movement  Denies LOF, VB, cramping, contractions  Vitals:    18 1144   BP: 108/67     Fundal height: 30cm  FHR: 140bpm    1  Pregnancy:  - Continue routine prenatal care  - Fetal kick counts discussed with pt; pt aware  - Pt strongly encouraged to undergo 28w lab testing and discussed importance of follow-up given her elevated 1hr GTT of 150  Offered to reprint lab requisitions; pt declined  Pt reports she will go tomorrow    - Pt reports she has previously received the influenza, Tdap vaccinations  - Check in card at next appointment  - Return to clinic for routine prenatal appointment in 2 weeks    Huber Onofre DO  OB/GYN, PGY3  2018, 11:49 AM

## 2018-11-26 NOTE — PATIENT INSTRUCTIONS
Pregnancy at 32 to 30 [address]:   What changes are happening in my body? You may notice new symptoms such as shortness of breath, heartburn, or swelling of your ankles and feet  You may also have trouble sleeping or contractions  How do I care for myself at this stage of my pregnancy? · Eat a variety of healthy foods  Healthy foods include fruits, vegetables, whole-grain breads, low-fat dairy foods, beans, lean meats, and fish  Drink liquids as directed  Ask how much liquid to drink each day and which liquids are best for you  Limit caffeine to less than 200 milligrams each day  Limit your intake of fish to 2 servings each week  Choose fish low in mercury such as canned light tuna, shrimp, salmon, cod, or tilapia  Do not  eat fish high in mercury such as swordfish, tilefish, scahin mackerel, and shark  · Manage heartburn  by eating 4 or 5 small meals each day instead of large meals  Avoid spicy food  · Manage swelling  by lying down and putting your feet up  · Take prenatal vitamins as directed  Your need for certain vitamins and minerals, such as folic acid, increases during pregnancy  Prenatal vitamins provide some of the extra vitamins and minerals you need  Prenatal vitamins may also help to decrease the risk of certain birth defects  · Talk to your healthcare provider about exercise  Moderate exercise can help you stay fit  Your healthcare provider will help you plan an exercise program that is safe for you during pregnancy  · Do not smoke  If you smoke, it is never too late to quit  Smoking increases your risk of a miscarriage and other health problems during your pregnancy  Smoking can cause your baby to be born too early or weigh less at birth  Ask your healthcare provider for information if you need help quitting  · Do not drink alcohol  Alcohol passes from your body to your baby through the placenta   It can affect your baby's brain development and cause fetal alcohol syndrome (FAS)  FAS is a group of conditions that causes mental, behavior, and growth problems  · Talk to your healthcare provider before you take any medicines  Many medicines may harm your baby if you take them when you are pregnant  Do not take any medicines, vitamins, herbs, or supplements without first talking to your healthcare provider  Never use illegal or street drugs (such as marijuana or cocaine) while you are pregnant  What are some safety tips during pregnancy? · Avoid hot tubs and saunas  Do not use a hot tub or sauna while you are pregnant, especially during your first trimester  Hot tubs and saunas may raise your baby's temperature and increase the risk of birth defects  · Avoid toxoplasmosis  This is an infection caused by eating raw meat or being around infected cat feces  It can cause birth defects, miscarriages, and other problems  Wash your hands after you touch raw meat  Make sure any meat is well-cooked before you eat it  Avoid raw eggs and unpasteurized milk  Use gloves or ask someone else to clean your cat's litter box while you are pregnant  What changes are happening with my baby? By 30 weeks, your baby may weigh more than 3 pounds  Your baby may be about 11 inches long from the top of the head to the rump (baby's bottom)  Your baby's eyes open and close now  Your baby's kicks and movements are more forceful at this time  What do I need to know about prenatal care? Your healthcare provider will check your blood pressure and weight  You may also need the following:  · Blood tests  may be done to check for anemia or blood type  · A urine test  may also be done to check for sugar and protein  These can be signs of gestational diabetes or infection  Protein in your urine may also be a sign of preeclampsia  Preeclampsia is a condition that can develop during week 20 or later of your pregnancy   It causes high blood pressure, and it can cause problems with your kidneys and other organs  · A Tdap vaccine and flu vaccine  may be recommended by your healthcare provider  · A gestational diabetes screen  will be done using an oral glucose tolerance test (OGTT)  An OGTT starts with a blood sugar level check after you have not eaten for 8 hours  You are then given a glucose drink  Your blood sugar level is checked after 1 hour, 2 hours, and sometimes 3 hours  Healthcare providers look at how much your blood sugar level increases from the first check  · Fundal height  is a measurement of your uterus to check your baby's growth  This number is usually the same as the number of weeks that you have been pregnant  Your healthcare provider may also check your baby's position  · Your baby's heart rate  will be checked  When should I seek immediate care? · You develop a severe headache that does not go away  · You have new or increased vision changes, such as blurred or spotted vision  · You have new or increased swelling in your face or hands  · You have vaginal spotting or bleeding  · Your water broke or you feel warm water gushing or trickling from your vagina  When should I contact my healthcare provider? · You have more than 5 contractions in 1 hour  · You notice any changes in your baby's movements  · You have abdominal cramps, pressure, or tightening  · You have a change in vaginal discharge  · You have chills or a fever  · You have vaginal itching, burning, or pain  · You have yellow, green, white, or foul-smelling vaginal discharge  · You have pain or burning when you urinate, less urine than usual, or pink or bloody urine  · You have questions or concerns about your condition or care  CARE AGREEMENT:   You have the right to help plan your care  Learn about your health condition and how it may be treated  Discuss treatment options with your caregivers to decide what care you want to receive   You always have the right to refuse treatment  The above information is an  only  It is not intended as medical advice for individual conditions or treatments  Talk to your doctor, nurse or pharmacist before following any medical regimen to see if it is safe and effective for you  © 2017 [address] [employer] Information is for End User's use only and may not be sold, redistributed or otherwise used for commercial purposes  All illustrations and images included in CareNotes® are the copyrighted property of A D A M [employer] Inc  or Adrian Jeter

## 2018-11-28 ENCOUNTER — TELEPHONE (OUTPATIENT)
Dept: OBGYN CLINIC | Facility: HOSPITAL | Age: 23
End: 2018-11-28

## 2018-11-28 NOTE — TELEPHONE ENCOUNTER
Pt called, said she is losing her voice, thinks she has an ear infection, and had a headache, wants to know what she can take  Pt is 29wks, was last seen at our office on 11/26/18  I advised pt to call her PCP and make appt for her ear pain so she can be properly treated  Advised her to take tylenol for her headache, can take 350mg x2 and no more than 3000mg in 1 day  Advised pt to call us back if headache does not get better with tylenol, or if headache gets very severe  Told pt that she can gargle with warm salt water and take cepacol lozenges for symptom relief for her throat  Pt verbalized understanding

## 2018-11-29 ENCOUNTER — OFFICE VISIT (OUTPATIENT)
Dept: URGENT CARE | Age: 23
End: 2018-11-29
Payer: COMMERCIAL

## 2018-11-29 VITALS
RESPIRATION RATE: 20 BRPM | HEART RATE: 95 BPM | WEIGHT: 237 LBS | DIASTOLIC BLOOD PRESSURE: 78 MMHG | HEIGHT: 66 IN | SYSTOLIC BLOOD PRESSURE: 140 MMHG | BODY MASS INDEX: 38.09 KG/M2 | TEMPERATURE: 97.9 F | OXYGEN SATURATION: 96 %

## 2018-11-29 DIAGNOSIS — H69.83 DYSFUNCTION OF BOTH EUSTACHIAN TUBES: ICD-10-CM

## 2018-11-29 DIAGNOSIS — J06.9 UPPER RESPIRATORY TRACT INFECTION, UNSPECIFIED TYPE: Primary | ICD-10-CM

## 2018-11-29 LAB — S PYO AG THROAT QL: NEGATIVE

## 2018-11-29 PROCEDURE — 87430 STREP A AG IA: CPT | Performed by: FAMILY MEDICINE

## 2018-11-29 PROCEDURE — 99213 OFFICE O/P EST LOW 20 MIN: CPT | Performed by: FAMILY MEDICINE

## 2018-11-29 PROCEDURE — 87070 CULTURE OTHR SPECIMN AEROBIC: CPT | Performed by: PHYSICIAN ASSISTANT

## 2018-11-29 NOTE — LETTER
November 29, 2018     Patient: Kevin Russ   YOB: 1995   Date of Visit: 11/29/2018       To Whom It May Concern: It is my medical opinion that Kevin Russ may return to work on 12/01/18  If you have any questions or concerns, please don't hesitate to call           Sincerely,        Juan J Damon PA-C    CC: No Recipients

## 2018-11-29 NOTE — PATIENT INSTRUCTIONS
Take over-the-counter pregnancy safe medications for symptomatic treatment if needed such as: Nasal Saline spray, warm water gargles, lozenges given to you by OB/Gyn  Call for throat culture results in 1-2 days  Stay hydrated with lots of water/fluids  Follow-up with PCP and OB/Gyn in the next few days for reexamination and to ensure resolution of symptoms  Go to the ED if any fevers, unable to stay hydrated, abdominal pain, chest pain, shortness of breath, headache, new or worsening symptoms or other concerning symptoms

## 2018-11-29 NOTE — PROGRESS NOTES
[facility]        NAME: Maico Min is a 26 y o  female  : 1995    MRN: [id number]  DATE: 2018  TIME: 3:40 PM    Assessment and Plan   Upper respiratory tract infection, unspecified type [J06 9]  1  Upper respiratory tract infection, unspecified type     2  Dysfunction of both eustachian tubes       Strep negative  Culture pending    Likely viral at this time  Also likely a component rhinitis/eustachian tube dysfunction  Patient agrees to try over-the-counter pregnancy safe medication for symptomatic treatment that were recommended to her by her OBGYN  She will call OBGYN for follow-up appointment in the next few days  She verbalizes good understanding on when to go to the emergency department  Info link was given for her to set up an appointment to establish care and to be rechecked with a PCP as she currently does not have 1    Patient Instructions     Take over-the-counter pregnancy safe medications for symptomatic treatment if needed such as: Nasal Saline spray, warm water gargles, lozenges given to you by OB/Gyn  Call for throat culture results in 1-2 days  Stay hydrated with lots of water/fluids  Follow-up with PCP and OB/Gyn in the next few days for reexamination and to ensure resolution of symptoms  Go to the ED if any fevers, unable to stay hydrated, abdominal pain, chest pain, shortness of breath, headache, new or worsening symptoms or other concerning symptoms  Chief Complaint     Chief Complaint   Patient presents with    Sore Throat     SS STARTED MONDAY AFTERNOON    Cough    Earache     BL EARS         History of Present Illness       Sore Throat    This is a new problem  Episode onset: 3 days ago  The problem has been unchanged  Neither side of throat is experiencing more pain than the other  There has been no fever  The pain is at a severity of 4/10  The pain is mild   Associated symptoms include congestion (nasal ), coughing (mostly dry, sometimes productive) and ear pain (bilateral)  Pertinent negatives include no abdominal pain, diarrhea, headaches, hoarse voice, neck pain, shortness of breath, stridor, trouble swallowing or vomiting  Exposure to: Sick contacts around the house with cold  States this started with her mom, then it was her boyfriend, now it is her  She has tried nothing for the symptoms  States she did call her OBGYN yesterday and they recommended to do warm gargles, throat lozenges, Tylenol as these are pregnancy safe  Patient has not tried any of these  She does not have a family doctor so she came here today as her OBGYN wanted her ears looked at and be tested for strep throat  Patient is 30 weeks gestational   She denies any headaches, hand/ face/other swelling, nausea, vomiting, abdominal pain, pelvic pain or other complaints  States she is still feeling the baby move  Denies any vaginal bleeding, loss of fluid or contractions  States she is only here for her cold symptoms  Review of Systems   Review of Systems   Constitutional: Negative for chills and fever  HENT: Positive for congestion (nasal ), ear pain (bilateral), rhinorrhea and sore throat  Negative for facial swelling, hoarse voice, sinus pain, sinus pressure, trouble swallowing and voice change  Eyes: Negative for discharge, itching and visual disturbance  Respiratory: Positive for cough (mostly dry, sometimes productive)  Negative for shortness of breath and stridor  Cardiovascular: Negative for chest pain and leg swelling  Gastrointestinal: Negative for abdominal pain, diarrhea, nausea and vomiting  Musculoskeletal: Negative for back pain and neck pain  Skin: Negative for rash  Neurological: Negative for dizziness, syncope, weakness, numbness and headaches  All other systems reviewed and are negative          Current Medications       Current Outpatient Prescriptions:     Prenatal Multivit-Min-Fe-FA (PRENATAL VITAMINS PO), Take by mouth, Disp: , Rfl:     Current Allergies     Allergies as of 11/29/2018    (No Known Allergies)            The following portions of the patient's history were reviewed and updated as appropriate: allergies, current medications, past family history, past medical history, past social history, past surgical history and problem list      Past Medical History:   Diagnosis Date    Bronchitis        History reviewed  No pertinent surgical history  Family History   Problem Relation Age of Onset    No Known Problems Mother     Cancer Father         skin    Rheum arthritis Maternal Grandmother     No Known Problems Sister     No Known Problems Brother          Medications have been verified  Objective   /78   Pulse 95   Temp 97 9 °F (36 6 °C) (Temporal)   Resp 20   Ht 5' 6" (1 676 m)   Wt 108 kg (237 lb)   LMP 05/05/2018 (Exact Date)   SpO2 96%   BMI 38 25 kg/m²        Physical Exam     Physical Exam   Constitutional: She is oriented to person, place, and time  She appears well-developed and well-nourished  No distress  HENT:   Head: Normocephalic and atraumatic  Mouth/Throat: Uvula is midline and mucous membranes are normal  No uvula swelling  Turbinates edematous b/l  Clear nasal dc  TMs intact, pearly, fluid visualized behind TM b/l  No mastoid tenderness  Mild PND present with mildly erythematous posterior pharynx  Airway patent, handling secretions  Eyes: Pupils are equal, round, and reactive to light  Conjunctivae and EOM are normal    Neck: Normal range of motion  Neck supple  Cardiovascular: Normal rate, regular rhythm and normal heart sounds  Pulmonary/Chest: Effort normal and breath sounds normal  No respiratory distress  She has no wheezes  Abdominal:   Gravid   Musculoskeletal: Normal range of motion  She exhibits no edema  Neurological: She is alert and oriented to person, place, and time  Skin: Skin is warm and dry  No rash noted     Psychiatric: She has a normal mood and affect  Her behavior is normal    Nursing note and vitals reviewed

## 2018-12-02 LAB — BACTERIA THROAT CULT: NORMAL

## 2018-12-04 ENCOUNTER — APPOINTMENT (OUTPATIENT)
Dept: LAB | Facility: HOSPITAL | Age: 23
End: 2018-12-04
Payer: COMMERCIAL

## 2018-12-04 ENCOUNTER — TELEPHONE (OUTPATIENT)
Dept: OBGYN CLINIC | Facility: HOSPITAL | Age: 23
End: 2018-12-04

## 2018-12-04 DIAGNOSIS — Z3A.26 26 WEEKS GESTATION OF PREGNANCY: ICD-10-CM

## 2018-12-04 DIAGNOSIS — O99.810 ABNORMAL GLUCOSE TOLERANCE TEST (GTT) DURING PREGNANCY, ANTEPARTUM: Primary | ICD-10-CM

## 2018-12-04 DIAGNOSIS — Z34.92 PRENATAL CARE IN SECOND TRIMESTER: ICD-10-CM

## 2018-12-04 LAB
BASOPHILS # BLD AUTO: 0.03 THOUSANDS/ΜL (ref 0–0.1)
BASOPHILS NFR BLD AUTO: 0 % (ref 0–1)
EOSINOPHIL # BLD AUTO: 0.1 THOUSAND/ΜL (ref 0–0.61)
EOSINOPHIL NFR BLD AUTO: 1 % (ref 0–6)
ERYTHROCYTE [DISTWIDTH] IN BLOOD BY AUTOMATED COUNT: 14 % (ref 11.6–15.1)
GLUCOSE P FAST SERPL-MCNC: 98 MG/DL (ref 65–99)
HCT VFR BLD AUTO: 39.3 % (ref 34.8–46.1)
HGB BLD-MCNC: 13.1 G/DL (ref 11.5–15.4)
IMM GRANULOCYTES # BLD AUTO: 0.1 THOUSAND/UL (ref 0–0.2)
IMM GRANULOCYTES NFR BLD AUTO: 1 % (ref 0–2)
LYMPHOCYTES # BLD AUTO: 1.41 THOUSANDS/ΜL (ref 0.6–4.47)
LYMPHOCYTES NFR BLD AUTO: 11 % (ref 14–44)
MCH RBC QN AUTO: 29.4 PG (ref 26.8–34.3)
MCHC RBC AUTO-ENTMCNC: 33.3 G/DL (ref 31.4–37.4)
MCV RBC AUTO: 88 FL (ref 82–98)
MONOCYTES # BLD AUTO: 0.77 THOUSAND/ΜL (ref 0.17–1.22)
MONOCYTES NFR BLD AUTO: 6 % (ref 4–12)
NEUTROPHILS # BLD AUTO: 10.21 THOUSANDS/ΜL (ref 1.85–7.62)
NEUTS SEG NFR BLD AUTO: 81 % (ref 43–75)
NRBC BLD AUTO-RTO: 0 /100 WBCS
PLATELET # BLD AUTO: 201 THOUSANDS/UL (ref 149–390)
PMV BLD AUTO: 11 FL (ref 8.9–12.7)
RBC # BLD AUTO: 4.45 MILLION/UL (ref 3.81–5.12)
WBC # BLD AUTO: 12.62 THOUSAND/UL (ref 4.31–10.16)

## 2018-12-04 PROCEDURE — 85025 COMPLETE CBC W/AUTO DIFF WBC: CPT

## 2018-12-04 PROCEDURE — 36415 COLL VENOUS BLD VENIPUNCTURE: CPT

## 2018-12-04 PROCEDURE — 82951 GLUCOSE TOLERANCE TEST (GTT): CPT

## 2018-12-04 PROCEDURE — 86592 SYPHILIS TEST NON-TREP QUAL: CPT

## 2018-12-04 NOTE — TELEPHONE ENCOUNTER
Pt with elevated fasting blood glucose  I made pt aware of her result and the need for her to get diabetic education for the pregnancy  Informed pt that referral has been placed for her for diabetic education at [facility], provided pt with the phone number to call to schedule appt  Pt verbalized understanding  Next PN appt with us scheduled for 12/10/2018

## 2018-12-05 ENCOUNTER — HOSPITAL ENCOUNTER (OUTPATIENT)
Facility: HOSPITAL | Age: 23
Discharge: HOME | End: 2018-12-05
Attending: OBSTETRICS & GYNECOLOGY | Admitting: OBSTETRICS & GYNECOLOGY
Payer: COMMERCIAL

## 2018-12-05 ENCOUNTER — TELEPHONE (OUTPATIENT)
Dept: OBGYN CLINIC | Facility: HOSPITAL | Age: 23
End: 2018-12-05

## 2018-12-05 VITALS
OXYGEN SATURATION: 96 % | SYSTOLIC BLOOD PRESSURE: 95 MMHG | TEMPERATURE: 98.2 F | DIASTOLIC BLOOD PRESSURE: 61 MMHG | HEART RATE: 83 BPM | HEIGHT: 66 IN | BODY MASS INDEX: 37.77 KG/M2 | WEIGHT: 235 LBS | RESPIRATION RATE: 18 BRPM

## 2018-12-05 PROBLEM — Z3A.30 30 WEEKS GESTATION OF PREGNANCY: Status: ACTIVE | Noted: 2018-08-14

## 2018-12-05 PROBLEM — R10.11 RUQ ABDOMINAL PAIN: Status: ACTIVE | Noted: 2018-12-05

## 2018-12-05 PROBLEM — R10.13 DYSPEPSIA: Status: ACTIVE | Noted: 2018-12-05

## 2018-12-05 LAB
ALBUMIN SERPL BCP-MCNC: 2.9 G/DL (ref 3.5–5)
ALP SERPL-CCNC: 146 U/L (ref 46–116)
ALT SERPL W P-5'-P-CCNC: 17 U/L (ref 12–78)
ANION GAP SERPL CALCULATED.3IONS-SCNC: 9 MMOL/L (ref 4–13)
AST SERPL W P-5'-P-CCNC: 12 U/L (ref 5–45)
BASOPHILS # BLD AUTO: 0.02 THOUSANDS/ΜL (ref 0–0.1)
BASOPHILS NFR BLD AUTO: 0 % (ref 0–1)
BILIRUB SERPL-MCNC: 0.23 MG/DL (ref 0.2–1)
BUN SERPL-MCNC: 7 MG/DL (ref 5–25)
CALCIUM SERPL-MCNC: 9.6 MG/DL (ref 8.3–10.1)
CHLORIDE SERPL-SCNC: 103 MMOL/L (ref 100–108)
CO2 SERPL-SCNC: 20 MMOL/L (ref 21–32)
CREAT SERPL-MCNC: 0.52 MG/DL (ref 0.6–1.3)
EOSINOPHIL # BLD AUTO: 0.08 THOUSAND/ΜL (ref 0–0.61)
EOSINOPHIL NFR BLD AUTO: 1 % (ref 0–6)
ERYTHROCYTE [DISTWIDTH] IN BLOOD BY AUTOMATED COUNT: 14 % (ref 11.6–15.1)
GFR SERPL CREATININE-BSD FRML MDRD: 136 ML/MIN/1.73SQ M
GLUCOSE SERPL-MCNC: 87 MG/DL (ref 65–140)
HCT VFR BLD AUTO: 37.1 % (ref 34.8–46.1)
HGB BLD-MCNC: 12.6 G/DL (ref 11.5–15.4)
IMM GRANULOCYTES # BLD AUTO: 0.12 THOUSAND/UL (ref 0–0.2)
IMM GRANULOCYTES NFR BLD AUTO: 1 % (ref 0–2)
LYMPHOCYTES # BLD AUTO: 1.54 THOUSANDS/ΜL (ref 0.6–4.47)
LYMPHOCYTES NFR BLD AUTO: 13 % (ref 14–44)
MCH RBC QN AUTO: 30 PG (ref 26.8–34.3)
MCHC RBC AUTO-ENTMCNC: 34 G/DL (ref 31.4–37.4)
MCV RBC AUTO: 88 FL (ref 82–98)
MONOCYTES # BLD AUTO: 0.71 THOUSAND/ΜL (ref 0.17–1.22)
MONOCYTES NFR BLD AUTO: 6 % (ref 4–12)
NEUTROPHILS # BLD AUTO: 9.2 THOUSANDS/ΜL (ref 1.85–7.62)
NEUTS SEG NFR BLD AUTO: 79 % (ref 43–75)
NRBC BLD AUTO-RTO: 0 /100 WBCS
PLATELET # BLD AUTO: 199 THOUSANDS/UL (ref 149–390)
PMV BLD AUTO: 11 FL (ref 8.9–12.7)
POTASSIUM SERPL-SCNC: 3.8 MMOL/L (ref 3.5–5.3)
PROT SERPL-MCNC: 7.4 G/DL (ref 6.4–8.2)
RBC # BLD AUTO: 4.2 MILLION/UL (ref 3.81–5.12)
RPR SER QL: NORMAL
SODIUM SERPL-SCNC: 132 MMOL/L (ref 136–145)
WBC # BLD AUTO: 11.67 THOUSAND/UL (ref 4.31–10.16)

## 2018-12-05 PROCEDURE — 76817 TRANSVAGINAL US OBSTETRIC: CPT | Performed by: OBSTETRICS & GYNECOLOGY

## 2018-12-05 PROCEDURE — 87086 URINE CULTURE/COLONY COUNT: CPT | Performed by: OBSTETRICS & GYNECOLOGY

## 2018-12-05 PROCEDURE — 85025 COMPLETE CBC W/AUTO DIFF WBC: CPT | Performed by: STUDENT IN AN ORGANIZED HEALTH CARE EDUCATION/TRAINING PROGRAM

## 2018-12-05 PROCEDURE — 80053 COMPREHEN METABOLIC PANEL: CPT | Performed by: STUDENT IN AN ORGANIZED HEALTH CARE EDUCATION/TRAINING PROGRAM

## 2018-12-05 PROCEDURE — 99214 OFFICE O/P EST MOD 30 MIN: CPT

## 2018-12-05 RX ORDER — ONDANSETRON 2 MG/ML
INJECTION INTRAMUSCULAR; INTRAVENOUS
Status: DISCONTINUED
Start: 2018-12-05 | End: 2018-12-05 | Stop reason: HOSPADM

## 2018-12-05 NOTE — PROGRESS NOTES
L&D Triage Note - OB/GYN  David Hartley 26 y o  female MRN: [id number]  Unit/Bed#: LD Triage  Encounter: [id number]      Assessment:  26 y o   at 30w4d not n  labor, bacterial vaginosis, RUQ pain     Plan:  1  Speculum, wet mt , dry mt , KOH,   2  TVUS cervical length  3  SVE  4  FHT monitoring  5  Urine culture      ______________________________________________________________________      Chief Compliant: pain at RUQ, nausea, vomiting    TIME: 0850  Subjective:  26 y o   at 30w4d c/o RUQ pain since yesterday  Decline vaginal bleeding, contractions and fluid leakage  Reporting good fetal movements  Decline headache, vision changes         Objective:  Vitals:    18 1257   BP: 107/65   Pulse: 93   Resp: 18   Temp: 97 7 °F (36 5 °C)   SpO2: 96%     Recent Results (from the past 24 hour(s))   CBC and differential    Collection Time: 18  3:23 PM   Result Value Ref Range    WBC 11 67 (H) 4 31 - 10 16 Thousand/uL    RBC 4 20 3 81 - 5 12 Million/uL    Hemoglobin 12 6 11 5 - 15 4 g/dL    Hematocrit 37 1 34 8 - 46 1 %    MCV 88 82 - 98 fL    MCH 30 0 26 8 - 34 3 pg    MCHC 34 0 31 4 - 37 4 g/dL    RDW 14 0 11 6 - 15 1 %    MPV 11 0 8 9 - 12 7 fL    Platelets 586 702 - 188 Thousands/uL    nRBC 0 /100 WBCs    Neutrophils Relative 79 (H) 43 - 75 %    Immat GRANS % 1 0 - 2 %    Lymphocytes Relative 13 (L) 14 - 44 %    Monocytes Relative 6 4 - 12 %    Eosinophils Relative 1 0 - 6 %    Basophils Relative 0 0 - 1 %    Neutrophils Absolute 9 20 (H) 1 85 - 7 62 Thousands/µL    Immature Grans Absolute 0 12 0 00 - 0 20 Thousand/uL    Lymphocytes Absolute 1 54 0 60 - 4 47 Thousands/µL    Monocytes Absolute 0 71 0 17 - 1 22 Thousand/µL    Eosinophils Absolute 0 08 0 00 - 0 61 Thousand/µL    Basophils Absolute 0 02 0 00 - 0 10 Thousands/µL   Comprehensive metabolic panel    Collection Time: 18  3:23 PM   Result Value Ref Range    Sodium 132 (L) 136 - 145 mmol/L    Potassium 3 8 3 5 - 5 3 mmol/L    Chloride 103 100 - 108 mmol/L    CO2 20 (L) 21 - 32 mmol/L    ANION GAP 9 4 - 13 mmol/L    BUN 7 5 - 25 mg/dL    Creatinine 0 52 (L) 0 60 - 1 30 mg/dL    Glucose 87 65 - 140 mg/dL    Calcium 9 6 8 3 - 10 1 mg/dL    AST 12 5 - 45 U/L    ALT 17 12 - 78 U/L    Alkaline Phosphatase 146 (H) 46 - 116 U/L    Total Protein 7 4 6 4 - 8 2 g/dL    Albumin 2 9 (L) 3 5 - 5 0 g/dL    Total Bilirubin 0 23 0 20 - 1 00 mg/dL    eGFR 136 ml/min/1 73sq m       SVE: 0 5 / 30% / -3  FHT:  130 / Moderate 6 - 25 bpm / 10x10 accels and no decels, reactive  [place]: q infrequent          Elier Gan MD 57/8/1361 6:04 PM

## 2018-12-05 NOTE — PROCEDURES
Kevin Russ, a  at 30w4d with an JASKARAN of 2019, by Last Menstrual Period, was seen at [address] for the following procedure(s): $Procedure Type: US - Transvaginal]                   Ultrasound Other  Fetal Presentation: Vertex  Cervical Length: 2 8  Funnel: No  Debris: No  Placenta Location: Anterior  Placenta Grade:  I  Placenta Previa: No  Vasa Previa: No

## 2018-12-05 NOTE — TELEPHONE ENCOUNTER
Pt had called and left VM on the RN line, she is 30wks pregnant and is having bad pains in her stomach and around her stomach  I attempted to call pt back but no answer, I left VM for her to call us back

## 2018-12-06 LAB — BACTERIA UR CULT: NORMAL

## 2018-12-10 ENCOUNTER — ROUTINE PRENATAL (OUTPATIENT)
Dept: OBGYN CLINIC | Facility: HOSPITAL | Age: 23
End: 2018-12-10
Payer: COMMERCIAL

## 2018-12-10 VITALS
HEIGHT: 66 IN | HEART RATE: 104 BPM | DIASTOLIC BLOOD PRESSURE: 70 MMHG | SYSTOLIC BLOOD PRESSURE: 130 MMHG | BODY MASS INDEX: 38.41 KG/M2 | WEIGHT: 239 LBS

## 2018-12-10 DIAGNOSIS — Z34.93 PRENATAL CARE IN THIRD TRIMESTER: Primary | ICD-10-CM

## 2018-12-10 PROBLEM — Z3A.31 31 WEEKS GESTATION OF PREGNANCY: Status: ACTIVE | Noted: 2018-08-14

## 2018-12-10 LAB
SL AMB  POCT GLUCOSE, UA: NORMAL
SL AMB LEUKOCYTE ESTERASE,UA: NORMAL
SL AMB POCT ALBUMIN: NORMAL

## 2018-12-10 PROCEDURE — 81002 URINALYSIS NONAUTO W/O SCOPE: CPT | Performed by: OBSTETRICS & GYNECOLOGY

## 2018-12-10 PROCEDURE — 99213 OFFICE O/P EST LOW 20 MIN: CPT | Performed by: OBSTETRICS & GYNECOLOGY

## 2018-12-10 NOTE — PROGRESS NOTES
Koby Ren is a 26 y o  female being seen today for her obstetrical visit  She is at 31w2d gestation  Patient reports heartburn, nausea, no bleeding, no contractions and cramping "here and there "  Fetal movement: normal     Menstrual History:  OB History      Para Term  AB Living    2 0 0 0 1 0    SAB TAB Ectopic Multiple Live Births    1 0 0 0 0         Menarche age: Per patient [age] "7th grade"  Patient's last menstrual period was 2018 (exact date)  The following portions of the patient's history were reviewed and updated as appropriate: allergies, current medications, past family history, past medical history, past social history, past surgical history and problem list     Review of Systems  Pain with standing otherwise ROS negative     Objective     /70   Pulse 104   Ht 5' 6" (1 676 m)   Wt 108 kg (239 lb)   LMP 2018 (Exact Date)   BMI 38 58 kg/m²   FHT:  150 BPM   Uterine Size: size equals dates   Presentation: unsure        Assessment     Pregnancy  31 weeks and 2 days   -Was recently seen in OB triage on 18 for RUQ abdominal pain  Transvaginal US, urine cx, CMP was unremarkable  Pt declined contractions, leakage, vaginal bleeding, headache, visual changes   -Today pt reports pain is slightly improved with tylenol; however she still has some abdominal pain  Plan     28-week labs reviewed, normal  Advised patient to call if abdominal patient worsens otherwise Follow up in 2 Weeks

## 2018-12-17 ENCOUNTER — ULTRASOUND (OUTPATIENT)
Dept: PERINATAL CARE | Facility: CLINIC | Age: 23
End: 2018-12-17
Payer: COMMERCIAL

## 2018-12-17 VITALS
BODY MASS INDEX: 39.28 KG/M2 | DIASTOLIC BLOOD PRESSURE: 88 MMHG | HEART RATE: 101 BPM | WEIGHT: 244.4 LBS | HEIGHT: 66 IN | SYSTOLIC BLOOD PRESSURE: 125 MMHG

## 2018-12-17 DIAGNOSIS — Z34.93 PRENATAL CARE IN THIRD TRIMESTER: ICD-10-CM

## 2018-12-17 DIAGNOSIS — Z3A.32 32 WEEKS GESTATION OF PREGNANCY: ICD-10-CM

## 2018-12-17 DIAGNOSIS — O24.410 DIET CONTROLLED GESTATIONAL DIABETES MELLITUS (GDM) IN THIRD TRIMESTER: ICD-10-CM

## 2018-12-17 PROCEDURE — 76816 OB US FOLLOW-UP PER FETUS: CPT | Performed by: OBSTETRICS & GYNECOLOGY

## 2018-12-17 PROCEDURE — 99213 OFFICE O/P EST LOW 20 MIN: CPT | Performed by: OBSTETRICS & GYNECOLOGY

## 2018-12-17 NOTE — PROGRESS NOTES
Gabriel Velásquez presents today for a fetal growth scan and review of cavum and kidneys  The missed anatomy is now complete  Fetal growth is in the 64th percentile with a normal ALEAH  Her fasting blood sugar was 98 and she is set up for diabetes education tomorrow  Recommend follow-up ultrasound for fetal growth in four weeks

## 2018-12-19 ENCOUNTER — TELEPHONE (OUTPATIENT)
Dept: PERINATAL CARE | Facility: CLINIC | Age: 23
End: 2018-12-19

## 2018-12-19 ENCOUNTER — HOSPITAL ENCOUNTER (OUTPATIENT)
Facility: HOSPITAL | Age: 23
Discharge: HOME | End: 2018-12-19
Attending: OBSTETRICS & GYNECOLOGY | Admitting: OBSTETRICS & GYNECOLOGY
Payer: COMMERCIAL

## 2018-12-19 VITALS
TEMPERATURE: 98.2 F | SYSTOLIC BLOOD PRESSURE: 107 MMHG | DIASTOLIC BLOOD PRESSURE: 73 MMHG | HEART RATE: 85 BPM | RESPIRATION RATE: 18 BRPM

## 2018-12-19 DIAGNOSIS — B96.89 BACTERIAL VAGINOSIS: Primary | ICD-10-CM

## 2018-12-19 DIAGNOSIS — N76.0 BACTERIAL VAGINOSIS: Primary | ICD-10-CM

## 2018-12-19 PROCEDURE — 76817 TRANSVAGINAL US OBSTETRIC: CPT | Performed by: OBSTETRICS & GYNECOLOGY

## 2018-12-19 PROCEDURE — 99213 OFFICE O/P EST LOW 20 MIN: CPT

## 2018-12-19 RX ORDER — METRONIDAZOLE 500 MG/1
500 TABLET ORAL EVERY 12 HOURS SCHEDULED
Qty: 14 TABLET | Refills: 0 | Status: SHIPPED | OUTPATIENT
Start: 2018-12-19 | End: 2018-12-26

## 2018-12-19 NOTE — PROCEDURES
Kevin Russ, a  at 32w4d with an JASKARAN of 2019, by Last Menstrual Period, was seen at [facility] for the following procedure(s): $Procedure Type: US - Transvaginal]                   Ultrasound Other  Fetal Presentation: Vertex  Cervical Length: 3 28  Funnel: No  Debris: No  Placenta Previa: No  Vasa Previa: No

## 2018-12-19 NOTE — LETTER
[address]  [address] [id number]  Dept: [phone]    December 19, 2018     Patient: Mariza Nunn   YOB: 1995   Date of Visit: 12/19/2018       To Whom it May Concern:    Mariza Nunn is under my professional care  She was seen in the hospital on 12/19/2018  Please excuse her absence from work  If you have any questions or concerns, please don't hesitate to call           Sincerely,           Kulwant Manzano MD

## 2018-12-19 NOTE — DISCHARGE INSTRUCTIONS
Bacterial Vaginosis   WHAT YOU NEED TO KNOW:   Bacterial vaginosis (BV) is an infection in the vagina  It may cause vaginitis, which is irritation and inflammation of the vagina  DISCHARGE INSTRUCTIONS:   Medicines:   · Antibiotics: These are given to kill the bacteria that cause BV  They may be given as a pill or a cream to put in your vagina  Take or use as directed  · Take your medicine as directed  Contact your healthcare provider if you think your medicine is not helping or if you have side effects  Tell him or her if you are allergic to any medicine  Keep a list of the medicines, vitamins, and herbs you take  Include the amounts, and when and why you take them  Bring the list or the pill bottles to follow-up visits  Carry your medicine list with you in case of an emergency  Prevent bacterial vaginosis:   · Keep your vaginal area clean and dry:  Wear underwear and pantyhose with a cotton crotch  Wipe from front to back after you urinate or have a bowel movement  After bathing, rinse soap from your vaginal area to decrease your risk for irritation  · Do not use products that cause irritation:  Always use unscented tampons or sanitary pads  Do not use feminine sprays, powders, or scented tampons because they may cause irritation and increase your risk of BV  Detergents and fabric softeners may also cause irritation  · Do not douche: This can cause an imbalance in healthy vaginal bacteria  · Use latex condoms: This helps prevent another infection and keeps your partner from getting the infection  Contact your healthcare provider if:   · Your symptoms come back or do not improve with treatment  · You have vaginal bleeding that is not your monthly period  · You have questions or concerns about your condition or care  © 2017 [employer]al[employer] Information is for End User's use only and may not be sold, redistributed or otherwise used for commercial purposes   All illustrations and images included in CareNotes® are the copyrighted property of A D A M , Inc  or Adrian Jeter  The above information is an  only  It is not intended as medical advice for individual conditions or treatments  Talk to your doctor, nurse or pharmacist before following any medical regimen to see if it is safe and effective for you

## 2018-12-19 NOTE — PROGRESS NOTES
L&D Triage Note - OB/GYN  King Herman 26 y o  female MRN: [id number]  Unit/Bed#: LD Triage  Encounter: [id number]      Assessment:  26 y o   at 32w4d with bacterial vaginosis  Plan:  1  Cramping  - Bacterial vaginosis based on positive whiff, clue cells, and previous clinical diagnosis  Prescribed Flagyl 500mg BID x7 days    - Not in  labor  Cervical length wnl      2  IUP at 32 weeks  - Diagnosis of gestational diabetes  - Explained to the patient the importance attending all prenatal appointments and educational sessions     - Patient states that she will reschedule the appointment  Discussed with Dr Nan Hill  Patient to be discharged home    ______________________________________________________________________      Chief Compliant: cramping    TIME: 1230  Subjective:  26 y o   at [address] who presents with period cramping since yesterday  She reports good fetal movement, denies contractions, denies vaginal bleeding, denies loss of fluid  She denies any changes in discharge, burning or vaginal itching  She denies significant complications with this pregnancy however she failed her 1 hr and 3 hr glucose and was supposed to go to diabetes education yesterday  Patient reports that she did not go to diabetes education as she fell asleep after she ate  She denies significant past medical history  Patient was seen in triage 2 weeks ago and reports being told that she had bacterial vaginosis but denies being prescribed any medications at that time  She did not take any medications for BV since she was last seen in triage        Objective:  Vitals:    18 1129   BP: 107/73   Pulse: 85   Resp: 18   Temp: 98 2 °F (36 8 °C)       SVE: Deferred  SSE:  Normal-appearing vaginal mucosa, normal-appearing external genitalia, grayish white discharge noted in vaginal vault, fishy odor, cervix visually closed   FHT:  150 / Moderate 6 - 25 bpm / accels, no decels  [place]: irritability TVUS:  Cervical length:3 28        Sonny Viveros MD 12/19/2018 12:58 PM

## 2018-12-23 ENCOUNTER — HOSPITAL ENCOUNTER (INPATIENT)
Facility: HOSPITAL | Age: 23
LOS: 3 days | Discharge: HOME | DRG: 560 | End: 2018-12-26
Attending: OBSTETRICS & GYNECOLOGY | Admitting: OBSTETRICS & GYNECOLOGY
Payer: COMMERCIAL

## 2018-12-23 DIAGNOSIS — Z34.93 PRENATAL CARE IN THIRD TRIMESTER: Primary | ICD-10-CM

## 2018-12-23 DIAGNOSIS — Z3A.32 32 WEEKS GESTATION OF PREGNANCY: ICD-10-CM

## 2018-12-23 PROBLEM — Z3A.33 33 WEEKS GESTATION OF PREGNANCY: Status: ACTIVE | Noted: 2018-08-14

## 2018-12-23 PROBLEM — O42.919 PRETERM PREMATURE RUPTURE OF MEMBRANES (PPROM) WITH UNKNOWN ONSET OF LABOR: Status: ACTIVE | Noted: 2018-12-23

## 2018-12-23 LAB
ABO GROUP BLD: NORMAL
AMPHETAMINES SERPL QL SCN: NEGATIVE
BARBITURATES UR QL: NEGATIVE
BASOPHILS # BLD AUTO: 0.02 THOUSANDS/ΜL (ref 0–0.1)
BASOPHILS NFR BLD AUTO: 0 % (ref 0–1)
BENZODIAZ UR QL: NEGATIVE
BLD GP AB SCN SERPL QL: NEGATIVE
COCAINE UR QL: NEGATIVE
EOSINOPHIL # BLD AUTO: 0.05 THOUSAND/ΜL (ref 0–0.61)
EOSINOPHIL NFR BLD AUTO: 0 % (ref 0–6)
ERYTHROCYTE [DISTWIDTH] IN BLOOD BY AUTOMATED COUNT: 13.8 % (ref 11.6–15.1)
GLUCOSE SERPL-MCNC: 104 MG/DL (ref 65–140)
GLUCOSE SERPL-MCNC: 111 MG/DL (ref 65–140)
GLUCOSE SERPL-MCNC: 64 MG/DL (ref 65–140)
GLUCOSE SERPL-MCNC: 89 MG/DL (ref 65–140)
GLUCOSE SERPL-MCNC: 92 MG/DL (ref 65–140)
GLUCOSE SERPL-MCNC: 94 MG/DL (ref 65–140)
GLUCOSE SERPL-MCNC: 99 MG/DL (ref 65–140)
HCT VFR BLD AUTO: 37.7 % (ref 34.8–46.1)
HGB BLD-MCNC: 12.6 G/DL (ref 11.5–15.4)
IMM GRANULOCYTES # BLD AUTO: 0.03 THOUSAND/UL (ref 0–0.2)
IMM GRANULOCYTES NFR BLD AUTO: 0 % (ref 0–2)
LYMPHOCYTES # BLD AUTO: 1.61 THOUSANDS/ΜL (ref 0.6–4.47)
LYMPHOCYTES NFR BLD AUTO: 14 % (ref 14–44)
MCH RBC QN AUTO: 29.5 PG (ref 26.8–34.3)
MCHC RBC AUTO-ENTMCNC: 33.4 G/DL (ref 31.4–37.4)
MCV RBC AUTO: 88 FL (ref 82–98)
METHADONE UR QL: NEGATIVE
MONOCYTES # BLD AUTO: 0.61 THOUSAND/ΜL (ref 0.17–1.22)
MONOCYTES NFR BLD AUTO: 5 % (ref 4–12)
NEUTROPHILS # BLD AUTO: 9.54 THOUSANDS/ΜL (ref 1.85–7.62)
NEUTS SEG NFR BLD AUTO: 81 % (ref 43–75)
NRBC BLD AUTO-RTO: 0 /100 WBCS
OPIATES UR QL SCN: NEGATIVE
PCP UR QL: NEGATIVE
PLATELET # BLD AUTO: 202 THOUSANDS/UL (ref 149–390)
PMV BLD AUTO: 11.2 FL (ref 8.9–12.7)
RBC # BLD AUTO: 4.27 MILLION/UL (ref 3.81–5.12)
RH BLD: POSITIVE
SPECIMEN EXPIRATION DATE: NORMAL
THC UR QL: NEGATIVE
WBC # BLD AUTO: 11.86 THOUSAND/UL (ref 4.31–10.16)

## 2018-12-23 PROCEDURE — 86900 BLOOD TYPING SEROLOGIC ABO: CPT | Performed by: OBSTETRICS & GYNECOLOGY

## 2018-12-23 PROCEDURE — 99214 OFFICE O/P EST MOD 30 MIN: CPT

## 2018-12-23 PROCEDURE — 87653 STREP B DNA AMP PROBE: CPT | Performed by: OBSTETRICS & GYNECOLOGY

## 2018-12-23 PROCEDURE — 99251 PR INITL INPATIENT CONSULT NEW/ESTAB PT 20 MIN: CPT | Performed by: OBSTETRICS & GYNECOLOGY

## 2018-12-23 PROCEDURE — 59025 FETAL NON-STRESS TEST: CPT | Performed by: OBSTETRICS & GYNECOLOGY

## 2018-12-23 PROCEDURE — 86901 BLOOD TYPING SEROLOGIC RH(D): CPT | Performed by: OBSTETRICS & GYNECOLOGY

## 2018-12-23 PROCEDURE — 85025 COMPLETE CBC W/AUTO DIFF WBC: CPT | Performed by: OBSTETRICS & GYNECOLOGY

## 2018-12-23 PROCEDURE — 87491 CHLMYD TRACH DNA AMP PROBE: CPT | Performed by: OBSTETRICS & GYNECOLOGY

## 2018-12-23 PROCEDURE — 87591 N.GONORRHOEAE DNA AMP PROB: CPT | Performed by: OBSTETRICS & GYNECOLOGY

## 2018-12-23 PROCEDURE — 87086 URINE CULTURE/COLONY COUNT: CPT | Performed by: OBSTETRICS & GYNECOLOGY

## 2018-12-23 PROCEDURE — 86850 RBC ANTIBODY SCREEN: CPT | Performed by: OBSTETRICS & GYNECOLOGY

## 2018-12-23 PROCEDURE — 86592 SYPHILIS TEST NON-TREP QUAL: CPT | Performed by: OBSTETRICS & GYNECOLOGY

## 2018-12-23 PROCEDURE — 80307 DRUG TEST PRSMV CHEM ANLYZR: CPT | Performed by: OBSTETRICS & GYNECOLOGY

## 2018-12-23 PROCEDURE — 82948 REAGENT STRIP/BLOOD GLUCOSE: CPT

## 2018-12-23 RX ORDER — ACETAMINOPHEN 325 MG/1
650 TABLET ORAL EVERY 6 HOURS PRN
Status: DISCONTINUED | OUTPATIENT
Start: 2018-12-23 | End: 2018-12-24

## 2018-12-23 RX ORDER — SODIUM CHLORIDE, SODIUM LACTATE, POTASSIUM CHLORIDE, CALCIUM CHLORIDE 600; 310; 30; 20 MG/100ML; MG/100ML; MG/100ML; MG/100ML
125 INJECTION, SOLUTION INTRAVENOUS CONTINUOUS
Status: DISCONTINUED | OUTPATIENT
Start: 2018-12-23 | End: 2018-12-24

## 2018-12-23 RX ORDER — ONDANSETRON 2 MG/ML
4 INJECTION INTRAMUSCULAR; INTRAVENOUS EVERY 6 HOURS PRN
Status: DISCONTINUED | OUTPATIENT
Start: 2018-12-23 | End: 2018-12-24

## 2018-12-23 RX ORDER — AZITHROMYCIN 250 MG/1
1000 TABLET, FILM COATED ORAL ONCE
Status: COMPLETED | OUTPATIENT
Start: 2018-12-23 | End: 2018-12-23

## 2018-12-23 RX ORDER — BETAMETHASONE SODIUM PHOSPHATE AND BETAMETHASONE ACETATE 3; 3 MG/ML; MG/ML
12 INJECTION, SUSPENSION INTRA-ARTICULAR; INTRALESIONAL; INTRAMUSCULAR; SOFT TISSUE EVERY 24 HOURS
Status: DISCONTINUED | OUTPATIENT
Start: 2018-12-23 | End: 2018-12-24

## 2018-12-23 RX ADMIN — BETAMETHASONE SODIUM PHOSPHATE AND BETAMETHASONE ACETATE 12 MG: 3; 3 INJECTION, SUSPENSION INTRA-ARTICULAR; INTRALESIONAL; INTRAMUSCULAR at 11:07

## 2018-12-23 RX ADMIN — SODIUM CHLORIDE, SODIUM LACTATE, POTASSIUM CHLORIDE, AND CALCIUM CHLORIDE 125 ML/HR: .6; .31; .03; .02 INJECTION, SOLUTION INTRAVENOUS at 10:54

## 2018-12-23 RX ADMIN — AMPICILLIN SODIUM 2000 MG: 2 INJECTION, POWDER, FOR SOLUTION INTRAMUSCULAR; INTRAVENOUS at 16:46

## 2018-12-23 RX ADMIN — AZITHROMYCIN 1000 MG: 250 TABLET, FILM COATED ORAL at 11:04

## 2018-12-23 RX ADMIN — ONDANSETRON 4 MG: 2 INJECTION INTRAMUSCULAR; INTRAVENOUS at 20:14

## 2018-12-23 RX ADMIN — AMPICILLIN SODIUM 2000 MG: 2 INJECTION, POWDER, FOR SOLUTION INTRAMUSCULAR; INTRAVENOUS at 11:05

## 2018-12-23 RX ADMIN — ACETAMINOPHEN 650 MG: 325 TABLET, FILM COATED ORAL at 19:10

## 2018-12-23 RX ADMIN — SODIUM CHLORIDE, SODIUM LACTATE, POTASSIUM CHLORIDE, AND CALCIUM CHLORIDE 125 ML/HR: .6; .31; .03; .02 INJECTION, SOLUTION INTRAVENOUS at 20:00

## 2018-12-23 NOTE — H&P
H&P Exam - Obstetrics   Salima Landon 26 y o  female MRN: [id number]  Unit/Bed#: LD Triage  Encounter: [id number]      History of Present Illness     Chief Complaint: PPROM    HPI:  Salima Landon is a 26 y o   female with an JASKARAN of 2019, by Last Menstrual Period at 33w1d weeks gestation who is being admitted for SROM at 0600  She is complaining of contractions and declining vaginal bleeding  Reporting good fetal movements  Decline headache, vision changes and no RUQ pain  She was sleeping and waking up with wetness  Recently has been treated for bacterial vaginosis with Flagyl 500 mg BID  50 g Glucola was 150 and 3 hour could not complete  Contractions: yes  Loss of fluid: yes  Vaginal bleeding: no  Fetal movement: good    She is a Clinic patient  PREGNANCY COMPLICATIONS:    Prenatal care in third trimester    33 weeks gestation of pregnancy    Diet controlled gestational diabetes mellitus (GDM) in third trimester    Dyspepsia    Bacterial vaginosis     premature rupture of membranes (PPROM) with unknown onset of labor         OB History    Para Term  AB Living   2 0 0 0 1 0   SAB TAB Ectopic Multiple Live Births   1 0 0 0 0      # Outcome Date GA Lbr Vishal/2nd Weight Sex Delivery Anes PTL Lv   2 Current            1 SAB 01/15/18 4w0d                 Baby complications/comments: none    Review of Systems      Historical Information   No past medical history on file  No past surgical history on file    Social History   History   Alcohol Use    Yes     Comment: social      History   Drug Use No     History   Smoking Status    Never Smoker   Smokeless Tobacco    Never Used     Family History: non-contributory    Meds/Allergies      Prescriptions Prior to Admission   Medication    metroNIDAZOLE (FLAGYL) 500 mg tablet    Prenatal Multivit-Min-Fe-FA (PRENATAL VITAMINS PO)      No Known Allergies    OBJECTIVE:    Vitals: Blood pressure 115/70, pulse 86, temperature 98 3 °F (36 8 °C), temperature source Oral, resp  rate 20, height 5' 6" (1 676 m), weight 111 kg (244 lb), last menstrual period 2018, unknown if currently breastfeeding  Body mass index is 39 38 kg/m²  Physical Exam      [place]: positive  Nitrazine: positive    Cervix:  Dilation: 1  Effacement (%): 80  Station: -1    Fetal heart rate:    125/cat 1     [place]:     4-5     EFW: 5    GBS: unknown    Prenatal Labs: I have personally reviewed pertinent reports  , Blood Type:   Lab Results   Component Value Date/Time    ABO Grouping AB 2018 12:29 PM     , D (Rh type):   Lab Results   Component Value Date/Time    Rh Factor Positive 2018 12:29 PM    HCT/HGB:   Lab Results   Component Value Date/Time    Hematocrit 37 1 2018 03:23 PM    Hemoglobin 12 6 2018 03:23 PM      , MCV:   Lab Results   Component Value Date/Time    MCV 88 2018 03:23 PM      , Platelets:   Lab Results   Component Value Date/Time    Platelets 105  03:23 PM   Rubella:   Lab Results   Component Value Date/Time    Rubella IgG Quant 12 3 2018 12:29 PM        , VDRL/RPR:   Lab Results   Component Value Date/Time    RPR Non-Reactive 2018 10:46 AM      , Urine Culture/Screen:   Lab Results   Component Value Date/Time    Urine Culture 30,000-39,000 cfu/ml  2018 02:12 PM     Hep B:   Lab Results   Component Value Date/Time    Hepatitis B Surface Ag Non-reactive 2018 12:29 PM     , HIV:   Lab Results   Component Value Date/Time    HIV-1/HIV-2 Ab Non-Reactive 2018 12:29 PM    Gonorrhea:   Lab Results   Component Value Date/Time    N gonorrhoeae, DNA Probe N  gonorrhoeae Amplified DNA Negative 2018 09:25 AM        Assessment/Plan     ASSESSMENT:  22yo  at 33w1d weeks gestation who is being admitted for PPROM      PLAN:   1) Admit   2) CBC, RPR, Blood Type   3) Start with prophylactic  antibiotic, Betamethasone   4) GBS unknwn status: Ampicillin and Azithromycin   5) Analgesia and/or epidural at patient request   6) NICU consult              7) MFM consult   8) Discussed with Dr Lalita Ross      This patient will be an INPATIENT  and I certify the anticipated length of stay is >2 Midnights      Catarina Huizar MD  [id number]  8:48 AM

## 2018-12-23 NOTE — PROGRESS NOTES
Notified by nursing that patient reports an increase in painful contractions which she rates an 8/10  At bedside to evaluate patient and she is tearful however states that tears are more from the situation then contractions  Irregular contractions currently noted on toco   No contractions palpated on physical exam   Provided patient with a clicker and instructed her to press the clicker every time she feels a contraction  If patient reports continue increased frequency and intensity of contractions will repeat a cervical exam      Vitals:    18 1532   BP: 133/71   Pulse: 76   Resp: 18   Temp: 98 2 °F (36 8 °C)     FHTs:  130s/moderate variability/accelerations present/no decelerations noted  [place]: Irregular contractions    A/P:  26year-old  010 at 33 weeks 1 day who was currently admitted with PPROM  Plan to defer cervical exam at this time as patient is ruptured to minimize risk of infection  Will re-evaluate patient pending on changes in clinical status      Minnie Diego MD, MPH  OB/GYN, PGY3  2018, 6:53 PM

## 2018-12-23 NOTE — PROGRESS NOTES
To bedside to reassess patient  Patient reports she continues to feel ctxns  Same frequency as before, a little more uncomfortable  Rates them 6/10 but declines analgesia  Also continues to leak clear fluid  No bleeding  +FM    Temp:  [97 9 °F (36 6 °C)-98 3 °F (36 8 °C)] 98 2 °F (36 8 °C)  HR:  [76-86] 76  Resp:  [18-20] 18  BP: (115-133)/(70-82) 133/71    FHT cat1  [place]: irregular/irritable    A/P: 26 y o  Thuy  at 33w1d with PPROM  Subjective ctxns appreciated without regular pattern on monitor  Discussed with patient goal to prolong latency, so will defer SVE  If clinical status acutely changes she should notify us and will examine  Otherwise will manage as labor patient for now  If stable by AM, can consider advancing diet and decreasing frequency of monitoring      Chanel Myers MD  12/23/18  4:39 PM

## 2018-12-23 NOTE — CONSULTS
Consult - Maternal Fetal Medicine  Mandy Cabral 26 y o  female MRN: [id number]  Unit/Bed#: LD Triage  Encounter: [id number]    26 y o   at 33w1d by LMP  She is a patient of the [address] [place] [address] Reason:  PPROM    HPI:  Ms Meliza Stover is a 26 y o   at 33w1d who presents after rupture of membranes at 12  Patient reports a large gush of fluid that saturated her bed  Continues to leak now  ROM confirmed on exam by Dr Yumiko Corrales At present she reports ctxns that are regular and uncomfortable  She has good fetal movement and is only leaking clear fluid (no bleeding)  Patient's pregnancy has been complicated only by [id number] for which she has good control  Pregnancy Complications:  Patient Active Problem List   Diagnosis    Prenatal care in third trimester    33 weeks gestation of pregnancy    Diet controlled gestational diabetes mellitus (GDM) in third trimester    Dyspepsia    Bacterial vaginosis     premature rupture of membranes (PPROM) with unknown onset of labor       PMH:  No past medical history on file  PSH:  No past surgical history on file  Social Hx:  Social History     Social History    Marital status: Single     Spouse name: N/A    Number of children: N/A    Years of education: N/A     Occupational History    Not on file       Social History Main Topics    Smoking status: Never Smoker    Smokeless tobacco: Never Used    Alcohol use Yes      Comment: social     Drug use: No    Sexual activity: Yes     Partners: Male     Birth control/ protection: None     Other Topics Concern    Not on file     Social History Narrative    No narrative on file         OB Hx:  Obstetric History       T0      L0     SAB1   TAB0   Ectopic0   Multiple0   Live Births0       # Outcome Date GA Lbr Vishal/2nd Weight Sex Delivery Anes PTL Lv   2 Current            1 SAB 01/15/18 4w0d                 Meds:  No current facility-administered medications on file prior to encounter  Current Outpatient Prescriptions on File Prior to Encounter   Medication Sig Dispense Refill    metroNIDAZOLE (FLAGYL) 500 mg tablet Take 1 tablet (500 mg total) by mouth every 12 (twelve) hours for 7 days 14 tablet 0    Prenatal Multivit-Min-Fe-FA (PRENATAL VITAMINS PO) Take by mouth         Allergies:  No Known Allergies    Labs:  Blood type: AB+  Antibody: negative  Group B strep: unknown  HIV: negative  Hepatitis B: negative  RPR: NR  Rubella: Immune  Varicella Unknown  1 hour Glucose: 150 (3hr with fasting 98)    Physical Exam:  /70 (BP Location: Left arm)   Pulse 86   Temp 98 3 °F (36 8 °C) (Oral)   Resp 20   Ht 5' 6" (1 676 m)   Wt 111 kg (244 lb)   LMP 2018 (Exact Date)   BMI 39 38 kg/m²     Physical Exam   Constitutional: She is oriented to person, place, and time  She appears well-developed and well-nourished  HENT:   Head: Normocephalic and atraumatic  Eyes: No scleral icterus  Cardiovascular: Normal rate, regular rhythm and normal heart sounds  Exam reveals no gallop and no friction rub  No murmur heard  Pulmonary/Chest: Effort normal and breath sounds normal  No respiratory distress  She has no wheezes  She has no rales  Abdominal: She exhibits distension (gravid)  There is no tenderness  There is no rebound and no guarding  Neurological: She is alert and oriented to person, place, and time  Skin: Skin is warm and dry  No rash noted  No erythema  No pallor  Psychiatric: She has a normal mood and affect  Her behavior is normal        Estimated Fetal Weight: 2190g as of ultrasound 18  Presentation: cephalic by US per Dr Roseanna Gaston    SVE:  / % / -1 per Dr Jolly Jayesh:  876 / Moderate 6 - 25 bpm / +accels, reactive  [place]: irritability    Membranes: ROM per exam    Assessment:   26 y o   at 33w1d with PPROM  A1GDM  Plan:   1  PPROM at 33w1d  - Latency antibiotics: ampicillin 2g IV q6hr x48hr followed by amoxicillin x5d  Azithromycin 1g PO x1 dose  - Betamethasone for fetal lung maturity  - NICU consultation  - Limitation of SVEs (unless medically indicated) to help prolong latency  - UCx, UDS, GC, GBS  - Expectant management until 34wks, or sooner if clinically indicated  2  A1GDM  - Labor glucose monitoring protocol for now (q2hr, then q4hr if first 4 <100)  - Goal <100, consider insulin gtt if over goal  3  Preg at 33w1d  - cEFM  - Will manage as labor patient until ctxns subside and patient clinically stable  4  FEN  - Clear liquids  - IVF      Discussed with Dr Lance Hogan

## 2018-12-24 LAB
BACTERIA UR CULT: NORMAL
BASE EXCESS BLDCOA CALC-SCNC: -6.4 MMOL/L (ref 3–11)
BASE EXCESS BLDCOV CALC-SCNC: -5.2 MMOL/L (ref 1–9)
C TRACH DNA SPEC QL NAA+PROBE: NEGATIVE
GP B STREP DNA SPEC QL NAA+PROBE: NORMAL
HCO3 BLDCOA-SCNC: 23 MMOL/L (ref 17.3–27.3)
HCO3 BLDCOV-SCNC: 20 MMOL/L (ref 12.2–28.6)
N GONORRHOEA DNA SPEC QL NAA+PROBE: NEGATIVE
O2 CT VFR BLDCOA CALC: 4.1 ML/DL
OXYHGB MFR BLDCOA: 15.8 %
OXYHGB MFR BLDCOV: 59.3 %
PCO2 BLDCOA: 60.1 MM[HG] (ref 30–60)
PCO2 BLDCOV: 38.5 MM HG (ref 27–43)
PH BLDCOA: 7.2 [PH] (ref 7.23–7.43)
PH BLDCOV: 7.33 [PH] (ref 7.19–7.49)
PO2 BLDCOA: 11.9 MM HG (ref 5–25)
PO2 BLDCOV: 25.3 MM HG (ref 15–45)
RPR SER QL: NORMAL
SAO2 % BLDCOV: 14.9 ML/DL

## 2018-12-24 PROCEDURE — 88307 TISSUE EXAM BY PATHOLOGIST: CPT | Performed by: PATHOLOGY

## 2018-12-24 PROCEDURE — 59410 OBSTETRICAL CARE: CPT | Performed by: OBSTETRICS & GYNECOLOGY

## 2018-12-24 PROCEDURE — 82805 BLOOD GASES W/O2 SATURATION: CPT | Performed by: OBSTETRICS & GYNECOLOGY

## 2018-12-24 RX ORDER — METRONIDAZOLE 500 MG/1
500 TABLET ORAL EVERY 12 HOURS SCHEDULED
Status: COMPLETED | OUTPATIENT
Start: 2018-12-24 | End: 2018-12-25

## 2018-12-24 RX ORDER — BUTORPHANOL TARTRATE 1 MG/ML
1 INJECTION, SOLUTION INTRAMUSCULAR; INTRAVENOUS ONCE
Status: DISCONTINUED | OUTPATIENT
Start: 2018-12-24 | End: 2018-12-24

## 2018-12-24 RX ORDER — DOCUSATE SODIUM 100 MG/1
100 CAPSULE, LIQUID FILLED ORAL 2 TIMES DAILY
Status: DISCONTINUED | OUTPATIENT
Start: 2018-12-24 | End: 2018-12-26 | Stop reason: HOSPADM

## 2018-12-24 RX ORDER — IBUPROFEN 600 MG/1
600 TABLET ORAL EVERY 6 HOURS PRN
Status: DISCONTINUED | OUTPATIENT
Start: 2018-12-24 | End: 2018-12-26 | Stop reason: HOSPADM

## 2018-12-24 RX ORDER — OXYTOCIN/RINGER'S LACTATE 30/500 ML
250 PLASTIC BAG, INJECTION (ML) INTRAVENOUS CONTINUOUS
Status: DISCONTINUED | OUTPATIENT
Start: 2018-12-24 | End: 2018-12-26 | Stop reason: HOSPADM

## 2018-12-24 RX ORDER — DIPHENHYDRAMINE HCL 25 MG
25 TABLET ORAL EVERY 6 HOURS PRN
Status: DISCONTINUED | OUTPATIENT
Start: 2018-12-24 | End: 2018-12-26 | Stop reason: HOSPADM

## 2018-12-24 RX ORDER — OXYCODONE HYDROCHLORIDE AND ACETAMINOPHEN 5; 325 MG/1; MG/1
1 TABLET ORAL EVERY 4 HOURS PRN
Status: DISCONTINUED | OUTPATIENT
Start: 2018-12-24 | End: 2018-12-26 | Stop reason: HOSPADM

## 2018-12-24 RX ORDER — DIAPER,BRIEF,INFANT-TODD,DISP
1 EACH MISCELLANEOUS AS NEEDED
Status: DISCONTINUED | OUTPATIENT
Start: 2018-12-24 | End: 2018-12-26 | Stop reason: HOSPADM

## 2018-12-24 RX ORDER — OXYCODONE HYDROCHLORIDE AND ACETAMINOPHEN 5; 325 MG/1; MG/1
2 TABLET ORAL EVERY 4 HOURS PRN
Status: DISCONTINUED | OUTPATIENT
Start: 2018-12-24 | End: 2018-12-26 | Stop reason: HOSPADM

## 2018-12-24 RX ORDER — CALCIUM CARBONATE 200(500)MG
1000 TABLET,CHEWABLE ORAL DAILY PRN
Status: DISCONTINUED | OUTPATIENT
Start: 2018-12-24 | End: 2018-12-26 | Stop reason: HOSPADM

## 2018-12-24 RX ORDER — OXYTOCIN/RINGER'S LACTATE 30/500 ML
PLASTIC BAG, INJECTION (ML) INTRAVENOUS
Status: COMPLETED
Start: 2018-12-24 | End: 2018-12-24

## 2018-12-24 RX ORDER — LIDOCAINE HYDROCHLORIDE 10 MG/ML
INJECTION, SOLUTION EPIDURAL; INFILTRATION; INTRACAUDAL; PERINEURAL
Status: DISCONTINUED
Start: 2018-12-24 | End: 2018-12-24 | Stop reason: WASHOUT

## 2018-12-24 RX ORDER — SIMETHICONE 80 MG
80 TABLET,CHEWABLE ORAL 4 TIMES DAILY PRN
Status: DISCONTINUED | OUTPATIENT
Start: 2018-12-24 | End: 2018-12-26 | Stop reason: HOSPADM

## 2018-12-24 RX ORDER — ACETAMINOPHEN 325 MG/1
650 TABLET ORAL EVERY 6 HOURS PRN
Status: DISCONTINUED | OUTPATIENT
Start: 2018-12-24 | End: 2018-12-26 | Stop reason: HOSPADM

## 2018-12-24 RX ORDER — ONDANSETRON 2 MG/ML
4 INJECTION INTRAMUSCULAR; INTRAVENOUS EVERY 8 HOURS PRN
Status: DISCONTINUED | OUTPATIENT
Start: 2018-12-24 | End: 2018-12-26 | Stop reason: HOSPADM

## 2018-12-24 RX ADMIN — IBUPROFEN 600 MG: 600 TABLET ORAL at 01:23

## 2018-12-24 RX ADMIN — BENZOCAINE AND LEVOMENTHOL: 200; 5 SPRAY TOPICAL at 03:00

## 2018-12-24 RX ADMIN — Medication 250 UNITS: at 00:50

## 2018-12-24 RX ADMIN — DOCUSATE SODIUM 100 MG: 100 CAPSULE, LIQUID FILLED ORAL at 09:03

## 2018-12-24 RX ADMIN — IBUPROFEN 600 MG: 600 TABLET ORAL at 09:03

## 2018-12-24 RX ADMIN — AMPICILLIN SODIUM 2000 MG: 2 INJECTION, POWDER, FOR SOLUTION INTRAMUSCULAR; INTRAVENOUS at 00:03

## 2018-12-24 RX ADMIN — Medication 1 TABLET: at 09:03

## 2018-12-24 RX ADMIN — METRONIDAZOLE 500 MG: 500 TABLET ORAL at 02:54

## 2018-12-24 RX ADMIN — METRONIDAZOLE 500 MG: 500 TABLET ORAL at 17:28

## 2018-12-24 RX ADMIN — IBUPROFEN 600 MG: 600 TABLET ORAL at 17:32

## 2018-12-24 RX ADMIN — DOCUSATE SODIUM 100 MG: 100 CAPSULE, LIQUID FILLED ORAL at 17:27

## 2018-12-24 NOTE — LACTATION NOTE
Mom was set up with breast pump by bedside RN  States pumping went well  Pumping bra given and discussed hands on pumping

## 2018-12-24 NOTE — PLAN OF CARE
ANTEPARTUM     Maintain pregnancy as long as maternal and/or fetal condition is stable Completed          DISCHARGE PLANNING     Discharge to home or other facility with appropriate resources Progressing        INFECTION - ADULT     Absence or prevention of progression during hospitalization Progressing        Knowledge Deficit     Patient/family/caregiver demonstrates understanding of disease process, treatment plan, medications, and discharge instructions Progressing        PAIN - ADULT     Verbalizes/displays adequate comfort level or baseline comfort level Progressing        SAFETY ADULT     Patient will remain free of falls Progressing     Maintain or return to baseline ADL function Progressing     Maintain or return mobility status to optimal level Progressing

## 2018-12-24 NOTE — DISCHARGE INSTRUCTIONS
Vaginal Delivery   WHAT YOU NEED TO KNOW:   A vaginal delivery occurs when your baby is born through your vagina (birth canal)  DISCHARGE INSTRUCTIONS:   Seek care immediately if:   · Your leg feels warm, tender, and painful  It may look swollen and red  · You have a fever  · You are urinating very little, or not at all  · You have heavy vaginal bleeding that fills 1 or more sanitary pads in 1 hour  · You feel weak, dizzy, or faint  Contact your healthcare provider if:   · Your abdominal or perineal pain does not go away, or gets worse  · You feel depressed  · You have questions or concerns about your condition or care  Medicines:  · NSAIDs , such as ibuprofen, help decrease swelling, pain, and fever  This medicine is available with or without a doctor's order  NSAIDs can cause stomach bleeding or kidney problems in certain people  If you take blood thinner medicine, always ask your healthcare provider if NSAIDs are safe for you  Always read the medicine label and follow directions  · Stool softeners  make it easier for you to have a bowel movement  You may need this medicine to treat or prevent constipation  · Take your medicine as directed  Contact your healthcare provider if you think your medicine is not helping or if you have side effects  Tell him or her if you are allergic to any medicine  Keep a list of the medicines, vitamins, and herbs you take  Include the amounts, and when and why you take them  Bring the list or the pill bottles to follow-up visits  Carry your medicine list with you in case of an emergency  Follow up with your healthcare provider:  Most women need to return 6 weeks after a vaginal delivery  Ask your healthcare provider how to care for your wounds or stitches, if you have them  Write down your questions so you remember to ask them during your visits  Activity:  Rest as much as possible  Try to keep all activities short   You may be able to do some exercise soon after you have your baby  Talk with your healthcare provider before you start exercising  If you work outside the home, ask when you can return to your job  Kegel exercises:  Kegel exercises may help your vaginal and rectal muscles heal faster  You can do Kegel exercises by tightening and relaxing the muscles around your vagina  Kegel exercises help make the muscles stronger  Breast care:  When your milk comes in, your breasts may feel full and hard  Ask how to care for your breasts, even if you are not breastfeeding  Constipation:  You may have constipation for a period of time after you have your baby  Do not try to push the bowel movement out if it is too hard  High-fiber foods and extra liquids can help you prevent constipation  Examples of high-fiber foods are fruit and bran  Prune juice and water are good liquids to drink  You may also be told to take over-the-counter fiber and stool softener medicines  Take these items as directed  Ask how to prevent or treat hemorrhoids  Perineum care: Your perineum is the area between your vagina and anus  Keep the area clean and dry  This will help it heal and prevent infection  Wash the area gently with soap and water when you bathe or shower  Rinse your perineum with warm water after you urinate or have a bowel movement  Your healthcare provider may suggest you use a warm sitz bath to help decrease pain  To take a sitz bath, fill a bathtub with 4 to 6 inches of warm water  You may also use a sitz bath pan that fits inside the toilet  Sit in the sitz bath for 20 minutes  Do this 2 to 3 times a day, or as directed  The warm water can help decrease pain and swelling  Vaginal discharge: You will have vaginal discharge, called lochia, after your delivery  The lochia is red or dark brown with clots for 1 to 3 days after the birth  The amount will decrease and turn pale pink or brown for 3 to 10 days  It will turn white or yellow on the 10th or 14th day  Lochia is usually gone within 3 weeks  Use a sanitary pad rather than a tampon to prevent a vaginal infection  You will have lochia for up to 3 weeks after your baby is born  Monthly periods: Your period may start again within 7 to 9 weeks after your baby is born  If you are breastfeeding, it may take longer for your period to start again  You can still get pregnant again even though you do not have your monthly period  Talk with your healthcare provider about a birth control method if you do not want to get pregnant  Mood changes: Many new mothers have some kind of mood changes after delivery  Some of these changes occur because of lack of sleep, hormone changes, and caring for a new baby  Some mood changes can be more serious, such as postpartum depression  Talk with your healthcare provider if you feel unable to care for yourself or your baby  Sexual activity:  Do not have sex until your healthcare provider says it is okay  You may notice you have a decreased desire for sex, or sex may be painful  You may need to use a vaginal lubricant (gel) to help make sex more comfortable  ©  2600 [facility] Information is for End User's use only and may not be sold, redistributed or otherwise used for commercial purposes  All illustrations and images included in CareNotes® are the copyrighted property of [employer] M [employer] Inc  or Adrian Jeter  The above information is an  only  It is not intended as medical advice for individual conditions or treatments  Talk to your doctor, nurse or pharmacist before following any medical regimen to see if it is safe and effective for you  Postpartum Bleeding   WHAT YOU NEED TO KNOW:   Postpartum bleeding is vaginal bleeding after childbirth  This bleeding is normal, whether your baby was born vaginally or by   It contains blood and the tissue that lined the inside of your uterus when you were pregnant     DISCHARGE INSTRUCTIONS:   What to expect with postpartum bleeding:  Postpartum bleeding usually lasts at least 10 days, and may last longer than 6 weeks  Your bleeding may range from light (barely staining a pad) to heavy (soaking a pad in 1 hour)  Usually, you have heavier bleeding right after childbirth, which slows over the next few weeks until it stops  The bleeding is red or dark brown with clots for the first 1 to 3 days  It then turns pink for several days, and then becomes a white or yellow discharge until it ends  Follow up with your obstetrician as directed:  Do not have sex until your obstetrician says it is okay  Write down your questions so you remember to ask them during your visits  Contact your healthcare provider or obstetrician if:   · Your bleeding increases, or you have heavy bleeding that soaks a pad in 1 hour for 2 hours in a row  · You pass large blood clots  · You are breathing faster than normal, or your heart is beating faster than normal     · You are urinating less than usual, or not at all  · You feel dizzy  · You have questions or concerns about your condition or care  Seek immediate care or call 911 if:   · You are suddenly short of breath and feel lightheaded  · You have sudden chest pain  © 2017 [address] [employer]  Information is for End User's use only and may not be sold, redistributed or otherwise used for commercial purposes  All illustrations and images included in CareNotes® are the copyrighted property of [employer]  or Adrian Jeter  The above information is an  only  It is not intended as medical advice for individual conditions or treatments  Talk to your doctor, nurse or pharmacist before following any medical regimen to see if it is safe and effective for you  Postpartum Depression   WHAT YOU NEED TO KNOW:   What is postpartum depression? Postpartum depression is a mood disorder that occurs after giving birth   A mood is an emotion or a feeling  Moods affect your behavior and how you feel about yourself and life in general  Depression is a sad mood that you cannot control  Women often feel sad, afraid, or nervous after their baby is born  These feelings are called postpartum blues or baby blues, and they usually go away in 1 to 2 weeks  With postpartum depression, these symptoms get worse and continue for more than 2 weeks  Postpartum depression is a serious condition that affects your daily activities and relationships  What causes postpartum depression? Healthcare providers do not know exactly what causes postpartum depression  It may be caused by a sudden drop in hormone levels after childbirth  A previous episode of postpartum depression or a family history of depression may increase your risk  Several things may trigger postpartum depression:  · Lack of support from the baby's father or other family members    · Feeling more tired than usual    · Stress, a poor diet, or lack of sleep    · Pain after childbirth or pain during breastfeeding    · Sudden change in lifestyle  How is postpartum depression diagnosed? Postpartum depression affects your daily activities and your relationships with other people  Healthcare providers will ask you questions about your signs and symptoms and how they are affecting your life  The symptoms of postpartum depression usually begin within 1 month after childbirth  You feel depressed or lose interest in activities you enjoy nearly every day for at least 2 weeks  You also have 4 or more of the following symptoms:  · You feel tired or have less energy than usual      · You feel unimportant or guilty most of the time  · You think about hurting or killing yourself  · Your appetite changes  You may lose your appetite and lose weight without trying  Your appetite may also increase and you may gain weight  · You are restless, irritable, or withdrawn      · You have trouble concentrating and remembering things  You have trouble doing daily tasks or making decisions  · You have trouble sleeping, even after the baby is asleep  How is postpartum depression treated? · Psychotherapy:  During therapy, you will talk with healthcare providers about how to cope with your feelings and moods  This can be done alone or in a group  It may also be done with family members or your partner  · Antidepressants: This medicine is given to decrease or stop the symptoms of depression  You usually need to take antidepressants for several weeks before you begin to feel better  Do not stop taking antidepressants unless your healthcare provider tells you to  Healthcare providers may try a different antidepressant if one type does not work  What can I do to feel better? · Rest:  Do not try to do everything all at the same time  Do only what is needed and let other things wait until later  Ask your family or friends for help, especially if you have other children  Ask your partner to help with night feedings or other baby care  Try to sleep when the baby naps  · Get emotional support:  Share your feelings with your partner, a friend, or another mother  · Take care of yourself:  Shower and dress each day  Do not skip meals  Try to get out of the house a little each day  Get regular exercise  Eat a healthy diet  Avoid alcohol because it can make your depression worse  Do not isolate yourself  Go for a walk or meet with a friend  It is also important that you have some time by yourself each day  How do I find support and more information? · [address] [place] [address], Public Information & Communication Branch  [address], [address], [address]  [place] [address]   Phone: [phone]  Phone: [phone]  Web Address: \[facility]\""  When should I contact my healthcare provider? · You cannot make it to your next visit      · Your depression does not get better with treatment or it gets worse  · You have questions or concerns about your condition or care  When should I seek immediate care or call 911? · You think about hurting or killing yourself, your baby, or someone else  · You feel like other people want to hurt you  · You hear voices telling you to hurt yourself or your baby  CARE AGREEMENT:   You have the right to help plan your care  Learn about your health condition and how it may be treated  Discuss treatment options with your caregivers to decide what care you want to receive  You always have the right to refuse treatment  The above information is an  only  It is not intended as medical advice for individual conditions or treatments  Talk to your doctor, nurse or pharmacist before following any medical regimen to see if it is safe and effective for you  © 2017 [address] [employer] Information is for End User's use only and may not be sold, redistributed or otherwise used for commercial purposes  All illustrations and images included in CareNotes® are the copyrighted property of [employer]  or Adrian Jeter  Postpartum Perineal Care   WHAT YOU NEED TO KNOW:   Postpartum perineal care is care for your perineum after you have a baby  The perineum is your vagina and anus  DISCHARGE INSTRUCTIONS:   Care for your perineum:  Healthcare providers will give you a small squirt bottle and show you how to use it  Do the following after you use the toilet and before you put on a new pad:  · Remove the soiled pad    · Use the squirt bottle to rinse your perineum from front to back while you sit on the toilet     · Pat the area dry from front to back with toilet paper or a cotton cloth     · Put on a fresh pad    · Wash your hands  Decrease pain:  Ask your healthcare provider about these and other ways to decrease perineal pain:  · Sitz baths:  Healthcare providers may give you a portable sitz bath   This is a small tub that fits in the toilet  Fill the sitz bath or bathtub with 4 to 6 inches of warm water  Sit in the warm water for 20 minutes 2 to 3 times a day  · Ice:  Ice helps decrease swelling and pain  Ice may also help prevent tissue damage  Use an ice pack, or put crushed ice in a plastic bag  Cover it with a towel and place it on your perineum for 15 to 20 minutes every hour, or as directed  · Medicine spray, wipes, or pads:  Healthcare providers may give you a medicine spray or wipes soaked with numbing medicine to decrease the pain  Pads that contain an herb called witch hazel may also help reduce pain  Use these after perineal care or a sitz bath  Follow up with your healthcare provider as directed:  Write down your questions so you remember to ask them during your visits  Contact your healthcare provider if:   · You have heavy vaginal bleeding that fills 1 or more sanitary pads in 1 hour  · You have foul-smelling vaginal discharge  · You feel weak or lightheaded  · You have questions or concerns about your condition or care  Seek care immediately or call 911 if:   · You have large blood clots or bright red blood coming from your vagina  · You have abdominal pain, vomiting, and a fever  © 2017 [address] [employer]  Information is for End User's use only and may not be sold, redistributed or otherwise used for commercial purposes  All illustrations and images included in CareNotes® are the copyrighted property of [employer]  or Adrian Jeter  The above information is an  only  It is not intended as medical advice for individual conditions or treatments  Talk to your doctor, nurse or pharmacist before following any medical regimen to see if it is safe and effective for you  Self Care After Delivery   AMBULATORY CARE:   The postpartum period  is the period of time from delivery to about 6 weeks   During this time you may experience many physical and emotional changes  It is important to understand what is normal and when you need to call your healthcare provider  It is also important to know how to care for yourself during this time  Call 911 for any of the following:   · You soak through 1 pad in 15 minutes, have blurry vision, clammy or pale skin, and feel faint  · You faint or lose consciousness  · Your heart is beating faster than normal      · You have trouble breathing  · You cough up blood  Seek care immediately if:   · You soak through 1 or more pads in an hour, or pass blood clots larger than a quarter from your vagina  · Your leg is painful, red, and larger than normal      · You have severe abdominal pain  · You have a bad headache or changes in your vision  · Your episiotomy or C section incision is red, swollen, bleeding, or draining pus  · Your incision comes apart  · You see or hear things that are not there, or have thoughts of harming yourself or your baby  Contact your obstetrician or midwife if:   · You have a fever  · You have new or worsening pain in your abdomen or vagina  · You continue to have the baby blues 10 days after you deliver  · You have trouble sleeping  · You have foul-smelling discharge from your vagina  · You have pain or burning when you urinate  · You do not have a bowel movement for 3 days or more  · You have nausea or are vomiting  · You have hard lumps or red streaks over your breasts  · You have cracked nipples or bleed from your nipples  · You have questions or concerns about your condition or care  Physical changes: The following are normal changes after you give birth:  · Pain in the area between your anus and vagina    · Breast pain    · Constipation or hemorrhoids    · Hot or cold flashes    · Vaginal bleeding or discharge    · Mild to moderate abdominal cramping    · Difficulty controlling bowel movements or urine  Emotional changes:   The following are symptoms of the baby blues, or normal emotions after you give birth  The changes in your emotions may be caused by a drop in hormone levels after you deliver  If these symptoms last longer than 1 to 2 weeks after you give birth, you may have postpartum depression  · Feeling irritable    · Feeling sad    · Crying for no reason    · Feeling anxious  Breast care for nursing mothers: You may have sore breasts for 3 to 6 days after you give birth  This happens as your milk begins to fill your breasts  You may also have sore breasts if you do not breastfeed frequently  Do the following to care for your breasts:  · Apply a moist, warm, compress to your breast as directed  This may help soothe your breasts  Make sure the washcloth is not too hot before you apply it to your breast      · Nurse your baby or pump your milk frequently  This may prevent clogged milk ducts  Ask your healthcare provider how often to nurse or pump  · Massage your breasts as directed  This may help increase your milk flow  Gently rub your breasts in a circular motion before you breastfeed  You may need to gently squeeze your breast or nipple to help release milk  You can also use a breast pump to help release milk from your breast      · Wash your breasts with warm water only  Do not put soap on your nipples  Soap may cause your nipples to become dry  · Apply lanolin cream to your nipples as directed  Lanolin cream may add moisture to your skin and prevent nipple dryness  Always  wash off lanolin cream with warm water before you breastfeed  · Place pads in your bra  Your nipples may leak milk when you are not breastfeeding  You can place pads inside of your bra to help prevent leaking onto your clothing  Ask your healthcare provider where to purchase bra pads  · Get breastfeeding support if needed  There are healthcare providers who can answer questions about breastfeeding and provide you with support   Ask your healthcare provider who you can contact if you need breastfeeding support  Breast care for non-nursing mothers:  Milk will fill your breasts even if you bottle feed your baby  Do the following to help stop your milk from filling your breasts and causing pain:  · Wear a bra with support at all times  A sports bra or a tight-fitting bra will help stop your milk from coming in  · Apply ice on each breast for 15 to 20 minutes every hour or as directed  Use an ice pack, or put crushed ice in a plastic bag  Cover it with a towel  Ice helps your milk ducts shrink  · Keep your breasts away from warm water  Warm water will make it easier for milk to fill your breasts  Stand with your breasts away from warm water in the shower  · Limit how much you touch your breasts  This will prevent them from filling with milk  Perineum care: Your perineum is the area between your rectum and vagina  It is normal to have swelling and pain in this area after you give birth  If you had an episiotomy, your healthcare provider may give you special instructions  · Clean your perineum after you use the bathroom  This may prevent infection and help with healing  Use a spray bottle with warm water to clean your perineum  You may also gently spray warm water against your perineum when you urinate  Always wipe front to back  · Take a sitz bath as directed  A sitz bath may help relieve swelling and pain  Fill your bath tub or bucket with water up to your hips and sit in the water  Use cold water for 2 days after you deliver  Then use warm water  Ask your healthcare provider for more information about a sitz bath  · Apply ice packs for the first 24 hours or as directed  Use a plastic glove filled with ice or buy an ice pack  Wrap the ice pack or plastic glove in a small towel or wash cloth  Place the ice pack on your perineum for 20 minutes at a time  · Sit on a donut-shaped pillow    This may relieve pressure on your perineum when you sit  · Use wipes with medicine or take pills as directed  Your healthcare provider may tell you to use witch hazel pads  You can place witch hazel pads in the refrigerator before you apply them to your perineum  He may also tell you to take NSAIDs  Ask your healthcare provider how often to take pills or use wipes with medicine  · Do not go swimming or take tub baths for 4 to 6 weeks or as directed  This will help prevent an infection in your vagina or uterus  Bowel and bladder care: It may take 3 to 5 days to have a bowel movement after you deliver your baby  You can do the following to prevent or manage constipation, and get control of your bowel or bladder:  · Take stool softeners as directed  A stool softener is medicine that will make your bowel movements softer  This may prevent or relieve constipation  A stool softener may also make bowel movements less painful  · Drink plenty of liquids  Ask how much liquid to drink each day and which liquids are best for you  Liquids may help prevent constipation  · Eat foods high in fiber  Examples include fruits, vegetables, grains, beans, and lentils  Ask your healthcare provider how much fiber you need each day  Fiber may prevent constipation  · Do Kegel exercises as directed  Kegel exercises will help strengthen the muscles that control bowel movements and urination  Ask your healthcare provider for more information on Kegel exercises  · Apply cold compresses or medicine to hemorrhoids as directed  This may relieve swelling and pain  Your healthcare provider may tell you to apply ice or wipes with medicine to your hemorrhoids  He may also tell you to use a sitz bath  Ask your healthcare for more information on how to manage hemorrhoids  Nutrition:  Good nutrition is important in the postpartum period  It will help you return to a healthy weight, increase your energy levels, and prevent constipation   It will also help you get enough nutrients and calories if you are going to breastfeed your baby  · Eat a variety of healthy foods  Healthy foods include fruits, vegetables, whole-grain breads, low-fat dairy products, beans, lean meats, and fish  You may need 500 to 700 additional calories each day if you breastfeed your baby  You may also need extra protein  · Limit foods with added sugar and high amounts of fat  These foods are high in calories and low in healthy nutrients  Read food labels so you know how much sugar and fat is in the food you want to eat  · Drink 8 to 10 glasses of water per day  Water will help you make plenty of milk for your baby  It will also help prevent constipation  Drink a glass of water every time you breastfeed your baby  · Take vitamins as directed  Ask your healthcare provider what vitamins you need  · Limit caffeine and alcohol if you are breastfeeding  Caffeine and alcohol can get into your breast milk  Caffeine and alcohol can make your baby fussy  They can also interfere with your baby's sleep  Ask your healthcare provider if you can drink alcohol or caffeine  Rest and sleep: You may feel very tired in the postpartum period  Enough sleep will help you heal and give you energy to care for your baby  The following may help you get sleep and rest:  · Nap when your baby naps  Your baby may nap several times during the day  Get rest during this time  · Limit visitors  Many people may want to see you and your baby  Ask friends or family to visit on different days  This will give you time to rest      · Do not plan too much for one day  Put off household chores so that you have time to rest  Gradually do more each day  · Ask for help from family, friends, or neighbors  Ask them to help you with laundry, cleaning, or errands  Also ask someone to watch the baby while you take a nap or relax  Ask your partner to help with the care of your baby   Pump some of your breast milk so your partner can feed your baby during the night  Exercise after delivery:  Wait until your healthcare provider says it is okay to exercise  Exercise can help you lose weight, increase your energy levels, and manage your mood  It can also prevent constipation and blood clots  Start with gentle exercises such as walking  Do more as you have more energy  You may need to avoid abdominal exercises for 1 to 2 weeks after you deliver  Talk to your healthcare provider about an exercise plan that is right for you  Sexual activity after delivery:   · Do not have sex until your healthcare provider says it is okay  You may need to wait 4 to 6 weeks before you have sex  This may prevent infection and allow time to heal      · Your menstrual cycle may begin as soon as 3 weeks after you deliver  Your period may be delayed if you breastfeed your baby  You can become pregnant before you get your first postpartum period  Talk to your healthcare provider about birth control that is right for you  Some types of birth control are not safe during breastfeeding  For support and more information:  Join a support group for new mothers  Ask for help from family and friends with chores, errands, and care of your baby  · Office of Women's Health,  Department of Health and Human Services  [address], [id number] [place] [address]  [address], [address] [place] [address] 178  Phone: 2- 119 - [phone]  Web Address: www womenshealth gov  · March of [facility] Postpartum [address] , [address] [place] [address] Road  [address] [facility] , [address] [place] [address]  Web Address: [email or web address]  [email or web address]/pregnancy/[email or web address]-care  aspx  Follow up with your obstetrician or midwife as directed: You will need to follow up with your healthcare provider in 2 to 6 weeks after delivery  Write down your questions so you remember to ask them at your visits     © 2017 [address] Information is for End User's use only and may not be sold, redistributed or otherwise used for commercial purposes  All illustrations and images included in CareNotes® are the copyrighted property of A D A M , Inc  or Adrian Jeter  The above information is an  only  It is not intended as medical advice for individual conditions or treatments  Talk to your doctor, nurse or pharmacist before following any medical regimen to see if it is safe and effective for you

## 2018-12-24 NOTE — L&D DELIVERY NOTE
Delivery Summary - OB/GYN   Wellington Alpers 26 y o  female MRN: [id number]  Unit/Bed#: -01 Encounter: [id number]    Pre-delivery Diagnosis:   1  33w2d pregnancy  2  PPROM  3  A1GDM  4  Obesity  5  Bacterial Vaginosis    Post-delivery Diagnosis: same, delivered    Attending: Cinthya Willson    Assistant(s): Fahad    Procedure:     Anesthesia: None    Estimated Blood Loss:  200 mL    Specimens:   1  Arterial and venous cord gases  2  Cord blood  3  Segment of umbilical cord  4  Placenta to pathology     Complications:  None apparent    Findings:  1  Viable female  delivered on 18 at [facility] weight pending;  Apgar scores of 8 at one minute and 8 at five minutes  2  Spontaneous delivery of placenta with centrally inserted 3-vessel cord  3  Clear amniotic fluid  4  Small perineal laceration, hemostatic, not requiring repair    Blood gas, arterial, cord    Collection Time: 18 12:41 AM   Result Value Ref Range    pH, Cord Art 7 200 (L) 7 230 - 7 430    pCO2, Cord Art 60 1 (H) 30 0 - 60 0    pO2, Cord Art 11 9 5 0 - 25 0 mm HG    HCO3, Cord Art 23 0 17 3 - 27 3 mmol/L    Base Exc, Cord Art -6 4 (L) 3 0 - 11 0 mmol/L    O2 Content, Cord Art 4 1 ml/dl    O2 Hgb, Arterial Cord 15 8 %   Blood gas, venous, cord    Collection Time: 18 12:41 AM   Result Value Ref Range    pH, Cord Jeet 7 334 7 190 - 7 490    pCO2, Cord Jeet 38 5 27 0 - 43 0 mm HG    pO2, Cord Jeet 25 3 15 0 - 45 0 mm HG    HCO3, Cord Jeet 20 0 12 2 - 28 6 mmol/L    Base Exc, Cord Jeet -5 2 (L) 1 0 - 9 0 mmol/L    O2 Cont, Cord Jeet 14 9 mL/dL    O2 HGB,VENOUS CORD 59 3 %            Disposition: Patient tolerated the procedure well and was recovering in labor and delivery room with family and  before being transferred to the post-partum floor               Procedure Details     Description of procedure  Wellington Alpers is a 80-year-old  who was initially admitted at 33 weeks and 1 day for PPROM which occurred on 18 at 0600   She received a dose of betamethasone and was started on latency antibiotics  On admission she complained of contractions and was noted to be 1/80/-1  She was managed expectantly and several hours later reported an increase in intensity and frequency of contractions  On repeat cervical exam she made cervical change to 4/80/-1  She was moved to a labor room and progressed to complete within 2 hours  After pushing for 4 minutes, on 18 at [facility] patient delivered a viable female , weight pending, Apgars of 8 (1 min) and 8 (5 min)  The fetal vertex delivered spontaneously  There was no nuchal cord  The anterior shoulder delivered atraumatically with maternal expulsive forces and the assistance of downward traction  The posterior shoulder delivered with maternal expulsive forces and the assistance of upward traction  The remainder of the fetus delivered spontaneously  Upon delivery, the infant was placed on the mothers abdomen and the cord was clamped and cut after a 60s delay  The infant was noted to cry spontaneously  There was no evidence for injury  Awaiting nurse resuscitators evaluated the  at bedside  Arterial and venous cord blood gases and cord blood was collected for analysis  These were promptly sent to the lab  In the immediate post-partum, 30 units of IV pitocin was administered and the uterus was noted to contract down well with massage and pitocin  The placenta delivered spontaneously at [facility] and was noted to have a centrally inserted 3 vessel cord  The vagina, cervix, and perineum were inspected and there was noted to be a small perineal laceration that was hemostatic and did not require repair  At the conclusion of the delivery, all needle, sponge, and instrument counts were noted to be correct  Patient tolerated the procedure well and was allowed to recover in labor and delivery room with family  The  was transferred to NICU for prematurity    Dr Yue Aggarwal was present and participated in all key portions of the case      Kendall Sanchez MD, MPH  OB/GYN, PGY3  12/24/2018, 1:03 AM

## 2018-12-24 NOTE — PROGRESS NOTES
Notified by nursing that patient reports feeling more uncomfortable with contractions  At bedside to evaluate patient and she states that contractions have worsened since she was last evaluated  She reports contractions approximately every 2-4 minutes  Digital cervical exam was performed and patient has progressed from 1/80/1 to 4/80/1  Discussed with patient that she is likely in labor and will be moved to a labor room  We discussed options for analgesia and patient declines analgesia at this time  Plan to continue latency abx until delivery  Dr Jerri Butcher notified

## 2018-12-24 NOTE — PLAN OF CARE
ANTEPARTUM     Maintain pregnancy as long as maternal and/or fetal condition is stable Progressing        DISCHARGE PLANNING     Discharge to home or other facility with appropriate resources Progressing        INFECTION - ADULT     Absence or prevention of progression during hospitalization Progressing        Knowledge Deficit     Patient/family/caregiver demonstrates understanding of disease process, treatment plan, medications, and discharge instructions Progressing        PAIN - ADULT     Verbalizes/displays adequate comfort level or baseline comfort level Progressing        SAFETY ADULT     Patient will remain free of falls Progressing     Maintain or return to baseline ADL function Progressing     Maintain or return mobility status to optimal level Progressing

## 2018-12-24 NOTE — PROGRESS NOTES
Progress Note - OB/GYN   Elsa Gutierres 26 y o  female MRN: [id number]  Unit/Bed#: -01 Encounter: [id number]    Assessment:  PP#1 s/p Spontaneous Vaginal Delivery, stable    Plan:  1  Encourage ambulation  2  Encourage breastfeeding  3  Anticipate discharge tomorrow     Subjective/Objective   Chief Complaint:     PP#1 s/p Spontaneous Vaginal Delivery    Subjective:     Pain: Cramping  Tolerating PO: yes  Voiding: yes  Flatus: yes  BM: no  Ambulating: yes  Breastfeeding: Breastfeeding  Chest pain: no  Shortness of breath: no  Leg pain: no  Lochia: minimal    Objective:     Vitals:  Vitals:    12/24/18 0231 12/24/18 0246 12/24/18 0300 12/24/18 0421   BP: 121/56 139/78 135/62 113/54   BP Location: Right arm   Right arm   Pulse: 75 93 82 78   Resp: 20 20 18   Temp: 98 7 °F (37 1 °C)   97 7 °F (36 5 °C)   TempSrc: Oral   Oral   Weight:       Height:           Physical Exam:     Physical Exam   Constitutional: She is oriented to person, place, and time  She appears well-developed and well-nourished  No distress  Cardiovascular: Normal rate, regular rhythm and normal heart sounds  Exam reveals no gallop and no friction rub  No murmur heard  Pulmonary/Chest: Effort normal and breath sounds normal  No respiratory distress  She has no wheezes  She has no rales  Abdominal: Soft  She exhibits no distension  There is no tenderness  There is no rebound  Musculoskeletal: Normal range of motion  Neurological: She is alert and oriented to person, place, and time  Skin: Skin is warm  She is not diaphoretic  Psychiatric: She has a normal mood and affect  Her behavior is normal      Uterine fundus firm and non-tender, -2 cm below the umbilicus       Lab, Imaging and other studies: I have personally reviewed pertinent reports        Lab Results   Component Value Date    WBC 11 86 (H) 12/23/2018    HGB 12 6 12/23/2018    HCT 37 7 12/23/2018    MCV 88 12/23/2018     12/23/2018               [employer], MD  12/24/18

## 2018-12-24 NOTE — NURSING NOTE
recv'd report and assumed pt care  Upon entrance to pt rm  Pt found oob in Br vomiting, 250 ml green stained fluid  Pt transferred via w/c to rm 304  Oriented to  and Labor and delivery policies and procedures

## 2018-12-24 NOTE — OB LABOR/OXYTOCIN SAFETY PROGRESS
Labor Progress Note - Hugh Schmidt 26 y o  female MRN: [id number]    Unit/Bed#: -01 Encounter: [id number]    Obstetric History       T0      L0     SAB1   TAB0   Ectopic0   Multiple0   Live Births0      Gestational Age: 33w1d     Contraction Frequency (minutes): irritability (KOKO pt moving frequently)  Contraction Quality: Mild, Moderate  Tachysystole: No   Dilation: 4        Effacement (%): 80  Station: -1  Baseline Rate: 115 bpm  Fetal Heart Rate: 130 BPM  FHR Category: Category I          Notes/comments:   Patient stated that she felt like she had to have a bowel movement  On cervical exam she is now 5-6/90/-1  She is now requesting IV analgesia  Will order Stadol  Fetal heart tones have overall been reassuring  Category 1 with moderate variability and accelerations  There is however difficulty monitoring contractions            Carlos Earl MD 2018 11:52 PM

## 2018-12-24 NOTE — DISCHARGE SUMMARY
Discharge Summary - Matty Chavez 26 y o  female MRN: [id number]    Unit/Bed#: -01 Encounter: [id number]    Admission Date: 2018     Discharge Date: 2018    Admitting Diagnosis:   1  IUP at 33w1d  2  A1GDM  3  Bacterial vaginosis  4  PPROM    Discharge Diagnosis:   Same, delivered    Procedures:   spontaneous vaginal delivery    Admitting Attending: Aimee Weaver MD  Delivery Attending: Aimee Weaver MD  Discharge Attending:   Dheeraj Lucio MD    Hospital Course:     Matty Chavez is a 26 y o  [id number] who was admitted at 33w1d for PPROM  She was started on betamethasone for fetal benefit, ampicillin and azithromycin for latency antibiotics, NICU and MFM were consulted  She was managed expectantly and cervical exams were deferred  Patient's abdomen was palpably soft, but increasingly during her hospital stay she became more uncomfortable and was noted to make cervical change despite tocolytics  She made continuous cervical change and did not receive an epidural for pain control  She proceeded to make cervical change and became complete at 0:34  She started pushing at 00:34  She then underwent an uncomplicated spontaneous vaginal delivery and delivered a viable female  at 00:38  APGARS were 8, 8 at 1 and 5 minutes, respectively   weighed 5lb 4 5 oz  Placenta was delivered at 00:42   was then transferred to  nursery  Patient tolerated the procedure well and was transferred to recovery in stable condition  The patient's post partum course was unremarkable  On day of discharge, she was ambulating and able to reasonably perform all ADLs  She was voiding and had appropriate bowel function  Pain was well controlled  She was discharged home on postpartum day #2 without complications   Patient was instructed to follow up with her OB as an outpatient and was given appropriate warnings to call provider if she develops signs of infection or uncontrolled pain  On day of discharge she was ambulating, voiding spontaneously, tolerating oral intake and hemodynamically stable  She is breast feeding   Mom's blood type is AB positive, RhoGAM is not indicated  Condition at discharge:   stable     Disposition:   Home    Planned Readmission:   No    Discharge Medications:   Prenatal vitamin daily for 6 months or the duration of nursing whichever is longer  Motrin 600 mg orally every 6 hours as needed for pain  Tylenol (over the counter) per bottle directions as needed for pain  Hydrocortisone cream 1% (over the counter) applied 1-2x daily to hemorrhoids as needed  Witch hazel pads for hemorrhoidal discomfort as needed    Discharge instructions :   -Do not place anything (no partner, tampons or douche) in your vagina for 6 weeks  -You may walk for exercise for the first 6 weeks then gradually return to your usual activities    -Please do not drive for 1 week if you have no stitches and for 2 weeks if you have stitches or underwent a  delivery     -You may take baths or shower per your preference    -Please look at your bust (breasts) in the mirror daily and call provider for redness or tenderness or increased warmth  - If you have had a  please look at your incision daily as well and call provider for increasing redness or steady drainage from the incision    -Please call your provider if temperature > 100 4*F or 38* C, worsening pain or a foul discharge  Blane Grzegorz  OB/Gyn, PGY-1  2018  5:59 AM    I have participated in the care of this patient during this hospitalization and agree with the discharge summary      Neelam Denise MD  2018  7:20 AM

## 2018-12-25 PROCEDURE — 99024 POSTOP FOLLOW-UP VISIT: CPT | Performed by: OBSTETRICS & GYNECOLOGY

## 2018-12-25 RX ADMIN — DOCUSATE SODIUM 100 MG: 100 CAPSULE, LIQUID FILLED ORAL at 08:45

## 2018-12-25 RX ADMIN — METRONIDAZOLE 500 MG: 500 TABLET ORAL at 08:44

## 2018-12-25 RX ADMIN — IBUPROFEN 600 MG: 600 TABLET ORAL at 08:45

## 2018-12-25 RX ADMIN — Medication 1 TABLET: at 08:45

## 2018-12-25 RX ADMIN — IBUPROFEN 600 MG: 600 TABLET ORAL at 17:08

## 2018-12-25 RX ADMIN — DOCUSATE SODIUM 100 MG: 100 CAPSULE, LIQUID FILLED ORAL at 17:08

## 2018-12-25 RX ADMIN — METRONIDAZOLE 500 MG: 500 TABLET ORAL at 21:37

## 2018-12-25 NOTE — LACTATION NOTE
CONSULT - LACTATION  Ayden Gleason 26 y o  female MRN: [id number]    [facility] L&D Room / Bed:  318/ 578-04 Encounter: [id number]    Maternal Information     MOTHER:  N/A  Maternal Age: This patient's mother is not on file  OB History: This patient's mother is not on file  Previouse breast reduction surgery? No    Lactation history:   Has patient previously breast fed: No   How long had patient previously breast fed:     Previous breast feeding complications: This patient's mother is not on file  Birth information:  YOB: 1995   Time of birth:     Sex: female   Delivery type:     Birth Weight: No birth weight on file  Percent of Weight Change: Birth weight not on file     Gestational Age: <None>   [unfilled]    Assessment     Breast and nipple assessment: nto assessed at this time due to visitors in the room   Assessment: infant in NICU    Feeding assessment: infant in NICU  LATCH:  Latch: Audible Swallowing:     Type of Nipple:     Comfort (Breast/Nipple):     Hold (Positioning):     LATCH Score:            Feeding recommendations:  pump every 2-3 hours     Information on hand expression given  Discussed benefits of knowing how to manually express breast including stimulating milk supply, softening nipple for latch and evacuating breast in the event of engorgement  Met with mother  Provided mother with Ready, Set, Baby booklet  Discussed Skin to Skin contact an benefits to mom and baby  Talked about the delay of the first bath until baby has adjusted  Spoke about the benefits of rooming in  Feeding on cue and what that means for recognizing infant's hunger  Avoidance of pacifiers for the first month discussed  Talked about exclusive breastfeeding for the first 6 months  Positioning and latch reviewed as well as showing images of other feeding positions    Discussed the properties of a good latch in any position  Reviewed hand/manual expression  Discussed s/s that baby is getting enough milk and some s/s that breastfeeding dyad may need further help  Gave information on common concerns, what to expect the first few weeks after delivery, preparing for other caregivers, and how partners can help  Resources for support also provided  Encouraged parents to watch breastfeeding class in the education area of My Chart Bedside  Power point handout for breastfeeding class in My Chart Bedside given to family so they can follow along and write down questions they may have  Instructions given on pumping  Discussed when to start, frequency, different pumps available versus manual expression  Discussed hygiene of hands and supplies as well as assembly, placement of flanges, size of flanged, preparing the breast and cycles and suction settings on pump  Demonstrated use of hand pump  Discussed labeling of milk, storage, and preparation of stored milk  Given education on Increasing Breast Milk Supply for  A Baby in the NICU  Demonstrated how to use the pumping log to accommodate expectations on production of breast milk and given a tube of fabric to secure breast pump flanges so mom could massage breasts while pumping to increase her supply  Encouraged parents to call for assistance, questions, and concerns about breastfeeding  Extension provided    Michael Devi RN 12/25/2018 10:16 AM

## 2018-12-25 NOTE — PROGRESS NOTES
Progress Note - OB/GYN   Charles Campbell 26 y o  female MRN: [id number]  Unit/Bed#: -01 Encounter: [id number]    Charles Campbell is a patient of [facility]    Assessment:  Post partum day #1 s/p , stable, and doing well    Plan:  1   delivery at 33w   - Baby in NICU  2  A1GDM   - pt needs 2hr glucose check in 4-12w  3  Continue routine post partum care   - Encourage ambulation   - Encourage breastfeeding  4  Continue current meds    - See list below   - Pain controlled  5  Future contraception   - Pt is unsure of what she would like for birth control, discussed LARCs  6  Disposition   - VSS   - Anticipate discharge home tomorrow      Subjective/Objective     Chief Complaint:     Post partum day 1 from a  with no complaints today    Subjective:   Pain: no  Tolerating Oral Intake: yes  Voiding: yes  Flatus: yes  Bowel Movement: no  Ambulating: yes  Breastfeeding: Bottle feeding and Using breast pump  Chest Pain: no  Shortness of Breath: no  Leg Pain/Discomfort: no  Lochia: minimal    Objective:   Vitals: /73 (BP Location: Right arm)   Pulse 80   Temp 97 7 °F (36 5 °C) (Oral)   Resp 18   Ht 5' 6" (1 676 m)   Wt 111 kg (244 lb)   LMP 2018 (Exact Date)   Breastfeeding?  Unknown   BMI 39 38 kg/m²       Intake/Output Summary (Last 24 hours) at 18 0646  Last data filed at 18 1400   Gross per 24 hour   Intake                0 ml   Output             1250 ml   Net            -1250 ml       Lab Results   Component Value Date    WBC 11 86 (H) 2018    HGB 12 6 2018    HCT 37 7 2018    MCV 88 2018     2018       Meds/Allergies   Current Facility-Administered Medications   Medication Dose Route Frequency    acetaminophen (TYLENOL) tablet 650 mg  650 mg Oral Q6H PRN    benzocaine-menthol-lanolin-aloe (DERMOPLAST) 20-0 5 % topical spray   Topical 4x Daily PRN    calcium carbonate (TUMS) chewable tablet 1,000 mg  1,000 mg Oral Daily PRN    diphenhydrAMINE (BENADRYL) tablet 25 mg  25 mg Oral Q6H PRN    docusate sodium (COLACE) capsule 100 mg  100 mg Oral BID    hydrocortisone 1 % cream 1 application  1 application Topical PRN    ibuprofen (MOTRIN) tablet 600 mg  600 mg Oral Q6H PRN    metroNIDAZOLE (FLAGYL) tablet 500 mg  500 mg Oral Q12H [address]    ondansetron (ZOFRAN) injection 4 mg  4 mg Intravenous Q8H PRN    oxyCODONE-acetaminophen (PERCOCET) 5-325 mg per tablet 1 tablet  1 tablet Oral Q4H PRN    oxyCODONE-acetaminophen (PERCOCET) 5-325 mg per tablet 2 tablet  2 tablet Oral Q4H PRN    oxytocin (PITOCIN) 30 Units in lactated ringers 500 mL infusion  250 nasrin-units/min Intravenous Continuous    prenatal multivitamin tablet 1 tablet  1 tablet Oral Daily    simethicone (MYLICON) chewable tablet 80 mg  80 mg Oral 4x Daily PRN    witch hazel-glycerin (TUCKS) topical pad 1 pad  1 pad Topical PRN       Physical Exam:  General: in no apparent distress, well developed and well nourished and non-toxic  Cardiovascular: Cor RRR  Lungs: clear to auscultation bilaterally  Abdomen: abdomen is soft without significant tenderness, masses, organomegaly or guarding  Fundus: Firm and non-tender, 1 cm below the umbilicus  Lower extremeties: nontender    Robin Washington DO  PGY-2 OB/GYN   12/25/2018 6:46 AM

## 2018-12-26 VITALS
SYSTOLIC BLOOD PRESSURE: 115 MMHG | RESPIRATION RATE: 20 BRPM | BODY MASS INDEX: 39.21 KG/M2 | TEMPERATURE: 98.4 F | WEIGHT: 244 LBS | DIASTOLIC BLOOD PRESSURE: 72 MMHG | HEIGHT: 66 IN | HEART RATE: 57 BPM

## 2018-12-26 PROBLEM — O42.90 PREMATURE RUPTURE OF MEMBRANES: Status: ACTIVE | Noted: 2018-12-26

## 2018-12-26 PROBLEM — Z3A.33 33 WEEKS GESTATION OF PREGNANCY: Status: ACTIVE | Noted: 2018-12-26

## 2018-12-26 PROCEDURE — 99024 POSTOP FOLLOW-UP VISIT: CPT | Performed by: OBSTETRICS & GYNECOLOGY

## 2018-12-26 RX ORDER — ACETAMINOPHEN 325 MG/1
650 TABLET ORAL EVERY 4 HOURS PRN
Qty: 30 TABLET | Refills: 0
Start: 2018-12-26

## 2018-12-26 RX ORDER — MEDROXYPROGESTERONE ACETATE 150 MG/ML
150 INJECTION, SUSPENSION INTRAMUSCULAR ONCE
Status: DISCONTINUED | OUTPATIENT
Start: 2018-12-26 | End: 2018-12-26 | Stop reason: HOSPADM

## 2018-12-26 RX ORDER — IBUPROFEN 600 MG/1
600 TABLET ORAL EVERY 6 HOURS PRN
Qty: 30 TABLET | Refills: 0
Start: 2018-12-26

## 2018-12-26 RX ADMIN — Medication 1 TABLET: at 10:52

## 2018-12-26 RX ADMIN — DOCUSATE SODIUM 100 MG: 100 CAPSULE, LIQUID FILLED ORAL at 10:51

## 2018-12-26 RX ADMIN — BENZOCAINE AND LEVOMENTHOL: 200; 5 SPRAY TOPICAL at 10:52

## 2018-12-26 NOTE — LACTATION NOTE
Discharge book given  Discussed s/s engorgement and how to manage with medications and cool compresses as well as s/s mastitis and when to contact physician  Discussed hygiene of hands and supplies as well as assembly, placement of flanges, size of flanged, preparing the breast and cycles and suction settings on pump  Demonstrated use of hand pump  Discussed labeling of milk, storage, and preparation of stored milk  Enc to hand express about pumping to extract more milk  M/L flanges given as Mom states her nipples are sore from pumping  Fit  assessed  Enc to contact [facility] for any lactation needs after going home

## 2018-12-26 NOTE — PROGRESS NOTES
Progress Note - OB/GYN   Mandy Cabral 26 y o  female MRN: [id number]  Unit/Bed#: -01 Encounter: [id number]    Assessment:  Post partum Day #2 s/p , stable, baby in     Plan:  1  33 week PPROM  2  HSV  3  Continue routine post partum care   Encourage ambulation   Encourage breastfeeding  4  Birth control: Depo for 6 week bridge     Subjective:  Patient is doing well  She has no complaints  She anticipates discharge home today  Pain: yes, cramping, improved with meds  Tolerating PO: yes  Voiding: yes  Flatus: yes  BM: no   Ambulating: yes  Breastfeeding:  yes  Chest pain: no  Shortness of breath: no  Leg pain: no  Lochia: minimal    Objective:     Vitals:    18 2339   BP: 119/59   Pulse: 65   Resp: 18   Temp: 98 °F (36 7 °C)       Lab Results   Component Value Date    WBC 11 86 (H) 2018    HGB 12 6 2018    HCT 37 7 2018    MCV 88 2018     2018       Physical Exam:     Gen: AAOx3, NAD  CV: RRR  Lungs: CTA b/l  Abd: Soft, non-tender, non-distended, no rebound or guarding  Uterine fundus firm and non-tender, 1 cm below the umbilicus     Ext: Non tender    Kian Villafana MD  2018  5:28 AM

## 2018-12-26 NOTE — PLAN OF CARE

## 2018-12-26 NOTE — SOCIAL WORK
Breast pump consult  NICU admission  Baby girl Jerel Goldmann was born vaginally on 12/24 at 33wks  Attempted to meet with pt but she was already d/c'd and not in NICU either  Called and spoke with pt ([phone]) to introduce CM services  Pt reports she is doing well and this is 1st kid for her and FOB/SO Heraclio Zepeda ([phone]) who is involved and supportive  Pt reports she typically lives in [place] home with her mother/emergency contact [phone] ([email or web address]) ([phone]), but due to baby's admission she is staying much closer to hospital with FOB at his place ([facility] [address], [address] [address] [facility])  Pt reports they have good support system, all baby supplies needed at this time with some ordered items on the way  Pt reports she does have [address], plans to breast feed, and wants pump  Discussed options  Per pt request, Spectra pump ordered from [employer] via [address]  Pt reports this is her first pump order; notified [facility] of same  As discussed with pt and NICU RN Velma Gan , Spectra pump delivered to baby's bedside in NICU and pt will come back to hospital tonight to sign for pump and NICU will return original copy of delivery ticket to CM  Pt reports she and FOB are both employed with time off, typically walk as means of transportation, and she denies any mental health issues, drug use, or involvement with C&Y or VNA  Discussed NICU packet and per pt request, [facility] referral sent via email  NICU packet with Fran Gutierrez, social security, and CM contact info placed with breast pump and left with NICU RN Antonio CONDON  at baby bedside  Pt aware and agreeable to same and will  tonight  Pt denies any other CM needs at this time  No other CM needs noted  Will follow baby in NICU      CM reviewed d/c planning process including the following: identifying help at home, patient preference for d/c planning needs, Discharge Lounge, [facility] Meds to Bed program, availability of treatment team to discuss questions or concerns patient and/or family may have regarding understanding medications and recognizing signs and symptoms once discharged  CM also encouraged patient to follow up with all recommended appointments after discharge  Patient advised of importance for patient and family to participate in managing patients medical well being

## 2018-12-27 NOTE — UTILIZATION REVIEW
Notification of Maternity Inpatient Admission/Maternity Inpatient Authorization Request  This is a Notification of Maternity Inpatient Admission/Maternity Inpatient Authorization Request to our facility [address] [place] [address]  Please be advised that this patient is currently in our facility under Inpatient Status  Below you will find the Birth/ Summary, Attending Physician and Facilitys information including NPI#  and contact information for the Utilization Review Department where the patient is receiving care services  Facility: [address] [place] [address]  Address: [address] [place] [address]  Phone: [phone] Tax ID: [id number]  NPI: [id number]  MEDICARE ID: [id number]    Place of Service Code: 24   Place of Service Name: Inpatient Hospital  Presentation Date & Time: 2018  7:43 AM  Inpatient Admission Date & Time: 18 0846  Discharge Date & Time: 2018  3:15 PM   Discharge Disposition (if discharged): Home/Self Care  Attending Physician & NPI: Wanda Menon Md [[id number]]  [facility] [place]  [facility]stetrics and Gynecology  [address] [id number]  [address] [place] [address]  Phone 1: [phone]  Fax: [phone]  Mother of  Information: Nicholas Tiwari   MRN: [id number] YOB: 1995   Estimated Date of Delivery: 19  Type of Delivery: Vaginal, Spontaneous Delivery    Delivering clinician: David Mom   OB History      Para Term  AB Living    2 1 0 1 1 1    SAB TAB Ectopic Multiple Live Births    1 0 0 0 1         Name & MRN:   Information for the patient's :  Juan Francisco Martínez Girl  Erinn Valladares) [[id number]]      Delivery Information:  Sex: female  Delivered 2018 12:38 AM by Vaginal, Spontaneous Delivery; Gestational Age: 32w3d    [place] Measurements:  Weight: 5 lb 4 5 oz (2395 g);   Height: 18 5"    APGAR 1 minute 5 minutes 10 minutes Totals: 8 8 9     Thank you,  [facility] Utilization Review Department  Phone: Diane Magaña  [phone]; Fax [phone]  ATTENTION: Please call with any questions or concerns to [phone]  and carefully follow the prompts so that you are directed to the right person  Send all requests for admission clinical reviews, approved or denied determinations and any other requests to fax [phone]   All voicemails are confidential

## 2018-12-27 NOTE — SOCIAL WORK
12/27/18 at 10am - Pt signed delivery ticket for Spectra breast pump and paperwork was forwarded to [facility]  [facility] referral updated  No other CM needs noted

## 2019-01-17 ENCOUNTER — OFFICE VISIT (OUTPATIENT)
Dept: OBGYN CLINIC | Facility: CLINIC | Age: 24
End: 2019-01-17

## 2019-01-17 VITALS
SYSTOLIC BLOOD PRESSURE: 121 MMHG | HEART RATE: 62 BPM | DIASTOLIC BLOOD PRESSURE: 77 MMHG | WEIGHT: 225 LBS | HEIGHT: 66 IN | BODY MASS INDEX: 36.16 KG/M2

## 2019-01-17 PROBLEM — B96.89 BACTERIAL VAGINOSIS: Status: RESOLVED | Noted: 2018-12-19 | Resolved: 2019-01-17

## 2019-01-17 PROBLEM — O24.410 DIET CONTROLLED GESTATIONAL DIABETES MELLITUS (GDM) IN THIRD TRIMESTER: Status: RESOLVED | Noted: 2018-11-05 | Resolved: 2019-01-17

## 2019-01-17 PROBLEM — O42.919 PRETERM PREMATURE RUPTURE OF MEMBRANES (PPROM) WITH UNKNOWN ONSET OF LABOR: Status: RESOLVED | Noted: 2018-12-23 | Resolved: 2019-01-17

## 2019-01-17 PROBLEM — N76.0 BACTERIAL VAGINOSIS: Status: RESOLVED | Noted: 2018-12-19 | Resolved: 2019-01-17

## 2019-01-17 PROCEDURE — 99215 OFFICE O/P EST HI 40 MIN: CPT | Performed by: NURSE PRACTITIONER

## 2019-01-17 NOTE — PROGRESS NOTES
Subjective     Allyson Severin is a 26 y o  y o  female [id number] who presents for a postpartum visit  She is 4 weeks postpartum following a spontaneous vaginal delivery on 12/23/18  Pregnancy complicated by A1 GDM, obesity and PPROM-necessitating betamethasone and antibiotics while admitted  The delivery was at 33 1 gestational weeks  Postpartum course has been complicated by infant standing NICU  Baby's course has been complicated by standing NICU  Discharge last week  Baby is feeding by initially fed breast milk is now formula feeding  Bleeding staining only  Bowel function is normal  Bladder function is normal  Patient is not sexually active  Contraception method is condoms  Postpartum depression screening: negative  [place]-5  Patient states she has a supportive family  Is planning to return to work in March  Reviewed with patient she is eligible to return to work 6 weeks after delivery  The following portions of the patient's history were reviewed and updated as appropriate: allergies, current medications, past family history, past medical history, past social history, past surgical history and problem list     Review of Systems  Pertinent items are noted in HPI       Objective     /77   Wt 225lb  General:   appears stated age, cooperative, alert normal mood and affect   Neck: Neck: normal, supple,trachea midline, no masses   Heart: regular rate and rhythm, S1, S2 normal, no murmur, click, rub or gallop   Lungs: clear to auscultation bilaterally     Assessment/Plan     4 week postpartum exam    Pap smear 8/14/18- NILM    1  Contraception:  Condoms-encouraged consistent condom use  2  Patient verbalized understanding indications for [facility]  3   Signs and symptoms to report reviewed  Follow up in: 8/2019 for annual exam or sooner as needed

## 2019-01-17 NOTE — PATIENT INSTRUCTIONS
Postpartum Bleeding   WHAT YOU NEED TO KNOW:   What is postpartum bleeding? Postpartum bleeding is vaginal bleeding after childbirth  This bleeding is normal, whether your baby was born vaginally or by   It contains blood and the tissue that lined the inside of your uterus when you were pregnant  What should I expect with postpartum bleeding? Postpartum bleeding usually lasts at least 10 days, and may last longer than 6 weeks  Your bleeding may range from light (barely staining a pad) to heavy (soaking a pad in 1 hour)  Usually, you have heavier bleeding right after childbirth, which slows over the next few weeks until it stops  The bleeding is red or dark brown with clots for the first 1 to 3 days  It then turns pink for several days, and then becomes a white or yellow discharge until it ends  When should I contact my healthcare provider? · Your bleeding increases, or you have heavy bleeding that soaks a pad in 1 hour for 2 hours in a row  · You pass large blood clots  · You are breathing faster than normal, or your heart is beating faster than normal     · You are urinating less than usual, or not at all  · You feel dizzy  · You have questions or concerns about your condition or care  When should I seek immediate care or call 911? · You are suddenly short of breath and feel lightheaded  · You have sudden chest pain  CARE AGREEMENT:   You have the right to help plan your care  Learn about your health condition and how it may be treated  Discuss treatment options with your caregivers to decide what care you want to receive  You always have the right to refuse treatment  The above information is an  only  It is not intended as medical advice for individual conditions or treatments  Talk to your doctor, nurse or pharmacist before following any medical regimen to see if it is safe and effective for you    © 2017 [employer] Information is for End User's use only and may not be sold, redistributed or otherwise used for commercial purposes  All illustrations and images included in CareNotes® are the copyrighted property of A D A M , Inc  or Adrian Jeter  Postpartum Depression   WHAT YOU NEED TO KNOW:   What is postpartum depression? Postpartum depression is a mood disorder that occurs after giving birth  A mood is an emotion or a feeling  Moods affect your behavior and how you feel about yourself and life in general  Depression is a sad mood that you cannot control  Women often feel sad, afraid, or nervous after their baby is born  These feelings are called postpartum blues or baby blues, and they usually go away in 1 to 2 weeks  With postpartum depression, these symptoms get worse and continue for more than 2 weeks  Postpartum depression is a serious condition that affects your daily activities and relationships  What causes postpartum depression? Healthcare providers do not know exactly what causes postpartum depression  It may be caused by a sudden drop in hormone levels after childbirth  A previous episode of postpartum depression or a family history of depression may increase your risk  Several things may trigger postpartum depression:  · Lack of support from the baby's father or other family members    · Feeling more tired than usual    · Stress, a poor diet, or lack of sleep    · Pain after childbirth or pain during breastfeeding    · Sudden change in lifestyle  How is postpartum depression diagnosed? Postpartum depression affects your daily activities and your relationships with other people  Healthcare providers will ask you questions about your signs and symptoms and how they are affecting your life  The symptoms of postpartum depression usually begin within 1 month after childbirth  You feel depressed or lose interest in activities you enjoy nearly every day for at least 2 weeks   You also have 4 or more of the following symptoms:  · You feel tired or have less energy than usual      · You feel unimportant or guilty most of the time  · You think about hurting or killing yourself  · Your appetite changes  You may lose your appetite and lose weight without trying  Your appetite may also increase and you may gain weight  · You are restless, irritable, or withdrawn  · You have trouble concentrating and remembering things  You have trouble doing daily tasks or making decisions  · You have trouble sleeping, even after the baby is asleep  How is postpartum depression treated? · Psychotherapy:  During therapy, you will talk with healthcare providers about how to cope with your feelings and moods  This can be done alone or in a group  It may also be done with family members or your partner  · Antidepressants: This medicine is given to decrease or stop the symptoms of depression  You usually need to take antidepressants for several weeks before you begin to feel better  Do not stop taking antidepressants unless your healthcare provider tells you to  Healthcare providers may try a different antidepressant if one type does not work  What can I do to feel better? · Rest:  Do not try to do everything all at the same time  Do only what is needed and let other things wait until later  Ask your family or friends for help, especially if you have other children  Ask your partner to help with night feedings or other baby care  Try to sleep when the baby naps  · Get emotional support:  Share your feelings with your partner, a friend, or another mother  · Take care of yourself:  Shower and dress each day  Do not skip meals  Try to get out of the house a little each day  Get regular exercise  Eat a healthy diet  Avoid alcohol because it can make your depression worse  Do not isolate yourself  Go for a walk or meet with a friend  It is also important that you have some time by yourself each day  How do I find support and more information? · [address] [facility], Public Information & Communication Branch  [address], [address], [address]  [place] [address]   Phone: [phone]  Phone: [phone]  Web Address: Deni tn  When should I contact my healthcare provider? · You cannot make it to your next visit  · Your depression does not get better with treatment or it gets worse  · You have questions or concerns about your condition or care  When should I seek immediate care or call 911? · You think about hurting or killing yourself, your baby, or someone else  · You feel like other people want to hurt you  · You hear voices telling you to hurt yourself or your baby  CARE AGREEMENT:   You have the right to help plan your care  Learn about your health condition and how it may be treated  Discuss treatment options with your caregivers to decide what care you want to receive  You always have the right to refuse treatment  The above information is an  only  It is not intended as medical advice for individual conditions or treatments  Talk to your doctor, nurse or pharmacist before following any medical regimen to see if it is safe and effective for you  © 2017 [facility] Information is for End User's use only and may not be sold, redistributed or otherwise used for commercial purposes  All illustrations and images included in CareNotes® are the copyrighted property of A ANNIE A [employer] , Inc  or Adrian Jeter

## 2019-05-25 ENCOUNTER — OFFICE VISIT (OUTPATIENT)
Dept: URGENT CARE | Age: 24
End: 2019-05-25
Payer: COMMERCIAL

## 2019-05-25 VITALS
RESPIRATION RATE: 20 BRPM | TEMPERATURE: 98 F | DIASTOLIC BLOOD PRESSURE: 76 MMHG | OXYGEN SATURATION: 98 % | BODY MASS INDEX: 40.21 KG/M2 | SYSTOLIC BLOOD PRESSURE: 104 MMHG | WEIGHT: 250.2 LBS | HEART RATE: 98 BPM | HEIGHT: 66 IN

## 2019-05-25 DIAGNOSIS — H66.002 ACUTE SUPPURATIVE OTITIS MEDIA OF LEFT EAR WITHOUT SPONTANEOUS RUPTURE OF TYMPANIC MEMBRANE, RECURRENCE NOT SPECIFIED: Primary | ICD-10-CM

## 2019-05-25 PROCEDURE — 99213 OFFICE O/P EST LOW 20 MIN: CPT | Performed by: FAMILY MEDICINE

## 2019-05-25 RX ORDER — AMOXICILLIN 500 MG/1
500 CAPSULE ORAL EVERY 8 HOURS SCHEDULED
Qty: 30 CAPSULE | Refills: 0 | Status: SHIPPED | OUTPATIENT
Start: 2019-05-25 | End: 2019-06-04

## 2021-04-30 ENCOUNTER — HOSPITAL ENCOUNTER (EMERGENCY)
Facility: HOSPITAL | Age: 26
Discharge: HOME | End: 2021-04-30
Attending: EMERGENCY MEDICINE | Admitting: EMERGENCY MEDICINE

## 2021-04-30 VITALS
OXYGEN SATURATION: 97 % | RESPIRATION RATE: 20 BRPM | WEIGHT: 250.22 LBS | SYSTOLIC BLOOD PRESSURE: 148 MMHG | BODY MASS INDEX: 40.39 KG/M2 | TEMPERATURE: 98.4 F | HEART RATE: 91 BPM | DIASTOLIC BLOOD PRESSURE: 88 MMHG

## 2021-04-30 DIAGNOSIS — J30.2 SEASONAL ALLERGIES: ICD-10-CM

## 2021-04-30 DIAGNOSIS — J32.9 SINUSITIS: Primary | ICD-10-CM

## 2021-04-30 PROCEDURE — 99282 EMERGENCY DEPT VISIT SF MDM: CPT

## 2021-04-30 PROCEDURE — 99284 EMERGENCY DEPT VISIT MOD MDM: CPT | Performed by: PHYSICIAN ASSISTANT

## 2021-04-30 RX ORDER — AMOXICILLIN AND CLAVULANATE POTASSIUM 875; 125 MG/1; MG/1
1 TABLET, FILM COATED ORAL EVERY 12 HOURS
Qty: 14 TABLET | Refills: 0 | Status: SHIPPED | OUTPATIENT
Start: 2021-04-30 | End: 2021-05-07

## 2021-04-30 RX ORDER — FLUTICASONE PROPIONATE 50 MCG
1 SPRAY, SUSPENSION (ML) NASAL DAILY
Qty: 16 G | Refills: 0 | Status: SHIPPED | OUTPATIENT
Start: 2021-04-30

## 2021-04-30 RX ORDER — BENZONATATE 100 MG/1
100 CAPSULE ORAL 3 TIMES DAILY PRN
Qty: 21 CAPSULE | Refills: 0 | Status: SHIPPED | OUTPATIENT
Start: 2021-04-30

## 2021-04-30 NOTE — ED PROVIDER NOTES
History  Chief Complaint   Patient presents with    Earache     Pt c/o b/l ear pain, pt states "my boss told me that my face is puffy" denies any fevers at home  Chele Kemp is a 27 yo F presenting with nasal congestion, sinus pain/pressure, post-nasal drip, and bilateral ear pain for the past 3-4 days  Patient also reports noting "puffiness" to cheeks bilaterally since this morning  Reports purulent, yellow nasal discharge over the past several days  She notes prior to onset 3-4 days ago she did have some largely chronic nasal congestion and seasonal allergies  No medications prior to arrival for symptoms  She does report some overall fatigue  Denies fevers/chills  No known sick contacts  No recent travel  History provided by:  Patient   used: No    Sinus Problem  Pain details:     Location:  Maxillary and frontal    Duration:  4 days    Timing:  Constant  Progression:  Worsening  Chronicity:  New  Context: allergies and recent URI    Relieved by:  None tried  Worsened by:  Nothing  Ineffective treatments:  None tried  Associated symptoms: congestion, cough, ear pain, fatigue and rhinorrhea    Associated symptoms: no chest pain, no chills, no fever, no headaches, no nausea, no shortness of breath, no sore throat, no vomiting and no wheezing    Risk factors: no diabetes        Prior to Admission Medications   Prescriptions Last Dose Informant Patient Reported? Taking? Prenatal Multivit-Min-Fe-FA (PRENATAL VITAMINS PO)  Self Yes No   Sig: Take by mouth   acetaminophen (TYLENOL) 325 mg tablet   No No   Sig: Take 2 tablets (650 mg total) by mouth every 4 (four) hours as needed for mild pain, moderate pain, severe pain, headaches or fever Use 1-2 tablets every 4-6 hours as needed     ibuprofen (MOTRIN) 600 mg tablet   No No   Sig: Take 1 tablet (600 mg total) by mouth every 6 (six) hours as needed for mild pain, moderate pain, fever or headaches (cramping)      Facility-Administered Medications: None       History reviewed  No pertinent past medical history  History reviewed  No pertinent surgical history  Family History   Problem Relation Age of Onset    No Known Problems Mother     Cancer Father         skin    Rheum arthritis Maternal Grandmother     No Known Problems Sister     No Known Problems Brother      I have reviewed and agree with the history as documented  E-Cigarette/Vaping     E-Cigarette/Vaping Substances     Social History     Tobacco Use    Smoking status: Never Smoker    Smokeless tobacco: Never Used   Substance Use Topics    Alcohol use: Yes     Comment: social     Drug use: No       Review of Systems   Constitutional: Positive for fatigue  Negative for chills and fever  HENT: Positive for congestion, ear pain and rhinorrhea  Negative for sore throat  Eyes: Negative for pain and visual disturbance  Respiratory: Positive for cough  Negative for chest tightness, shortness of breath and wheezing  Cardiovascular: Negative for chest pain and palpitations  Gastrointestinal: Negative for abdominal pain, constipation, diarrhea, nausea and vomiting  Genitourinary: Negative for dysuria, frequency and urgency  Musculoskeletal: Negative for back pain, neck pain and neck stiffness  Skin: Negative for rash and wound  Neurological: Negative for dizziness, weakness, light-headedness, numbness and headaches  Physical Exam  Physical Exam  Constitutional:       General: She is not in acute distress  Appearance: She is well-developed  She is not diaphoretic  HENT:      Head: Normocephalic and atraumatic  Jaw: There is normal jaw occlusion  No tenderness or swelling  Right Ear: Tympanic membrane, ear canal and external ear normal       Left Ear: Tympanic membrane, ear canal and external ear normal       Nose: Mucosal edema present  Right Sinus: Maxillary sinus tenderness and frontal sinus tenderness present        Left Sinus: Maxillary sinus tenderness and frontal sinus tenderness present  Mouth/Throat:      Pharynx: Uvula midline  Posterior oropharyngeal erythema present  No oropharyngeal exudate  Tonsils: No tonsillar exudate  Eyes:      Conjunctiva/sclera: Conjunctivae normal       Pupils: Pupils are equal, round, and reactive to light  Neck:      Musculoskeletal: Normal range of motion and neck supple  Cardiovascular:      Rate and Rhythm: Normal rate and regular rhythm  Heart sounds: Normal heart sounds  No murmur  No friction rub  No gallop  Pulmonary:      Effort: Pulmonary effort is normal  No respiratory distress  Breath sounds: Normal breath sounds  No wheezing  Abdominal:      General: There is no distension  Palpations: Abdomen is soft  Tenderness: There is no abdominal tenderness  Lymphadenopathy:      Cervical: No cervical adenopathy  Skin:     General: Skin is warm and dry  Capillary Refill: Capillary refill takes less than 2 seconds  Findings: No erythema or rash  Neurological:      Mental Status: She is alert and oriented to person, place, and time  Motor: No abnormal muscle tone  Coordination: Coordination normal    Psychiatric:         Behavior: Behavior normal          Thought Content:  Thought content normal          Judgment: Judgment normal          Vital Signs  ED Triage Vitals [04/30/21 0807]   Temperature Pulse Respirations Blood Pressure SpO2   98 4 °F (36 9 °C) 91 20 148/88 97 %      Temp Source Heart Rate Source Patient Position - Orthostatic VS BP Location FiO2 (%)   Oral Monitor -- -- --      Pain Score       7           Vitals:    04/30/21 0807   BP: 148/88   Pulse: 91         Visual Acuity      ED Medications  Medications - No data to display    Diagnostic Studies  Results Reviewed     None                 No orders to display              Procedures  Procedures         ED Course                                           MDM  Number of Diagnoses or Management Options  Seasonal allergies:   Sinusitis:   Diagnosis management comments: Worsening of sinus congestion, pressure, pain and purulent discharge/post nasal drip over past 3-4 days in context of chronic allergic rhinitis  TTP over bilateral maxillary, frontal sinuses with nasal mucosa edema  No appreciable facial swelling noted but does report some to bilateral cheeks over past few days  Will provide augmentin x7 days for sinusitis, flonase, mucinex  Follow up with PCP encouraged  Return to ED indications discussed  Patient Progress  Patient progress: stable      Disposition  Final diagnoses:   Sinusitis   Seasonal allergies     Time reflects when diagnosis was documented in both MDM as applicable and the Disposition within this note     Time User Action Codes Description Comment    4/30/2021  8:53 AM Danielle Zamora [J32 9] Sinusitis     4/30/2021  8:53 AM Danielle Zamora [J30 2] Seasonal allergies       ED Disposition     ED Disposition Condition Date/Time Comment    Discharge Stable Fri Apr 30, 2021  8:53 AM Randy Gaspar discharge to home/self care              Follow-up Information     Follow up With Specialties Details Why [address] [facility] Emergency Department Emergency Medicine  If symptoms worsen [facility] [id number]  [facility] Emergency Department, [address] [place] [address], [address]          Discharge Medication List as of 4/30/2021  8:55 AM      START taking these medications    Details   amoxicillin-clavulanate (AUGMENTIN) 875-125 mg per tablet Take 1 tablet by mouth every 12 (twelve) hours for 7 days, Starting Fri 4/30/2021, Until Fri 5/7/2021, Normal      benzonatate (TESSALON PERLES) 100 mg capsule Take 1 capsule (100 mg total) by mouth 3 (three) times a day as needed for cough, Starting Fri 4/30/2021, Normal dextromethorphan-guaifenesin (MUCINEX DM)  MG per 12 hr tablet Take 1 tablet by mouth every 12 (twelve) hours as needed (Congestion, cough), Starting Fri 4/30/2021, Normal      fluticasone (FLONASE) 50 mcg/act nasal spray 1 spray into each nostril daily, Starting Fri 4/30/2021, Normal         CONTINUE these medications which have NOT CHANGED    Details   acetaminophen (TYLENOL) 325 mg tablet Take 2 tablets (650 mg total) by mouth every 4 (four) hours as needed for mild pain, moderate pain, severe pain, headaches or fever Use 1-2 tablets every 4-6 hours as needed , Starting Wed 12/26/2018, No Print      ibuprofen (MOTRIN) 600 mg tablet Take 1 tablet (600 mg total) by mouth every 6 (six) hours as needed for mild pain, moderate pain, fever or headaches (cramping), Starting Wed 12/26/2018, No Print      Prenatal Multivit-Min-Fe-FA (PRENATAL VITAMINS PO) Take by mouth, Historical Med           No discharge procedures on file      PDMP Review     None          ED Provider  Electronically Signed by           Jenn Roe PA-C  04/30/21 4990

## 2021-04-30 NOTE — DISCHARGE INSTRUCTIONS
Please refer to the attached information for strict return instructions  If symptoms worsen or new symptoms develop please return to the ER  Use prescribed medications as indicated   Please follow up with your primary care physician if symptoms persist

## 2021-04-30 NOTE — Clinical Note
Randy Gaspar was seen and treated in our emergency department on 4/30/2021  Diagnosis:     Nashali    She may return on this date: 05/03/2021    Does not require COVID-19 testing prior to returning to work at this time  If you have any questions or concerns, please don't hesitate to call        Zaina Andre PA-C    ______________________________           _______________          _______________  Hospital Representative                              Date                                Time

## 2024-03-14 ENCOUNTER — APPOINTMENT (OUTPATIENT)
Dept: URGENT CARE | Age: 29
End: 2024-03-14
Payer: MEDICARE

## 2024-03-14 ENCOUNTER — OFFICE VISIT (OUTPATIENT)
Dept: URGENT CARE | Age: 29
End: 2024-03-14
Payer: MEDICARE

## 2024-03-14 VITALS
DIASTOLIC BLOOD PRESSURE: 90 MMHG | HEART RATE: 85 BPM | RESPIRATION RATE: 18 BRPM | OXYGEN SATURATION: 98 % | TEMPERATURE: 97.3 F | SYSTOLIC BLOOD PRESSURE: 120 MMHG

## 2024-03-14 DIAGNOSIS — H69.93 DISORDER OF BOTH EUSTACHIAN TUBES: Primary | ICD-10-CM

## 2024-03-14 DIAGNOSIS — J30.2 SEASONAL ALLERGIC RHINITIS, UNSPECIFIED TRIGGER: ICD-10-CM

## 2024-03-14 PROCEDURE — 99213 OFFICE O/P EST LOW 20 MIN: CPT | Performed by: STUDENT IN AN ORGANIZED HEALTH CARE EDUCATION/TRAINING PROGRAM

## 2024-03-14 RX ORDER — FLUTICASONE PROPIONATE 50 MCG
2 SPRAY, SUSPENSION (ML) NASAL DAILY
Qty: 11.1 ML | Refills: 0 | Status: SHIPPED | OUTPATIENT
Start: 2024-03-14

## 2024-03-14 RX ORDER — CETIRIZINE HYDROCHLORIDE 10 MG/1
10 TABLET ORAL DAILY
Qty: 30 TABLET | Refills: 0 | Status: SHIPPED | OUTPATIENT
Start: 2024-03-14

## 2024-03-14 NOTE — PROGRESS NOTES
[facility]        NAME: Jim Serrato is a 28 y.o. female  : 1995    MRN: [id number]  DATE: 2024  TIME: 8:10 PM    Assessment and Plan   Disorder of both eustachian tubes [H69.93]  1. Disorder of both eustachian tubes  fluticasone (FLONASE) 50 mcg/act nasal spray    cetirizine (ZyrTEC) 10 mg tablet      2. Seasonal allergic rhinitis, unspecified trigger  fluticasone (FLONASE) 50 mcg/act nasal spray    cetirizine (ZyrTEC) 10 mg tablet            Patient Instructions   You do not have an infection in your ears right now.  You do have some fluid behind your eardrums which comes from inflammation of the sinuses.  This can come from a cold or from allergies.  I am prescribing you 2 medications, cetirizine 10 mg daily, and Flonase nasal spray.  It takes several days to weeks of taking these consistently for you to feel that your symptoms have resolved.  If these are not covered by insurance, you can purchase them over-the-counter.  If your symptoms are not improving, please follow-up with your PCP.    Follow up with PCP in 3-5 days.  Proceed to  ER if symptoms worsen.    If tests have been performed at [facility], our office will contact you with results if changes need to be made to the care plan discussed with you at the visit.  You can [facility] full [facility] on [facility].    Chief Complaint     Chief Complaint   Patient presents with    Earache     Bilateral earache for 1 week.          History of Present Illness       Patient presents for approximately 1 week of bilateral ear pain.  Has been getting a lot of viral illnesses from her child who is in .  Recently got over a cold at 1 week ago, since then she started having pressure and discomfort in both ears.  No fevers.  Also notes that her seasonal allergy symptoms are started to flareup.        Review of Systems   Review of Systems   All other systems reviewed and are negative.        Current Medications       Current  Outpatient Medications:     cetirizine (ZyrTEC) 10 mg tablet, Take 1 tablet (10 mg total) by mouth daily, Disp: 30 tablet, Rfl: 0    fluticasone (FLONASE) 50 mcg/act nasal spray, 2 sprays into each nostril daily, Disp: 11.1 mL, Rfl: 0    acetaminophen (TYLENOL) 325 mg tablet, Take 2 tablets (650 mg total) by mouth every 4 (four) hours as needed for mild pain, moderate pain, severe pain, headaches or fever Use 1-2 tablets every 4-6 hours as needed. (Patient not taking: Reported on 3/14/2024), Disp: 30 tablet, Rfl: 0    benzonatate (TESSALON PERLES) 100 mg capsule, Take 1 capsule (100 mg total) by mouth 3 (three) times a day as needed for cough (Patient not taking: Reported on 3/14/2024), Disp: 21 capsule, Rfl: 0    dextromethorphan-guaifenesin (MUCINEX DM)  MG per 12 hr tablet, Take 1 tablet by mouth every 12 (twelve) hours as needed (Congestion, cough) (Patient not taking: Reported on 3/14/2024), Disp: 14 tablet, Rfl: 0    ibuprofen (MOTRIN) 600 mg tablet, Take 1 tablet (600 mg total) by mouth every 6 (six) hours as needed for mild pain, moderate pain, fever or headaches (cramping) (Patient not taking: Reported on 3/14/2024), Disp: 30 tablet, Rfl: 0    Prenatal Multivit-Min-Fe-FA (PRENATAL VITAMINS PO), Take by mouth (Patient not taking: Reported on 3/14/2024), Disp: , Rfl:     Current Allergies     Allergies as of 03/14/2024    (No Known Allergies)            The following portions of the patient's history were reviewed and updated as appropriate: allergies, current medications, past family history, past medical history, past social history, past surgical history and problem list.     No past medical history on file.    No past surgical history on file.    Family History   Problem Relation Age of Onset    No Known Problems Mother     Cancer Father         skin    Rheum arthritis Maternal Grandmother     No Known Problems Sister     No Known Problems Brother          Medications have been  verified.        Objective   /90   Pulse 85   Temp (!) 97.3 °F (36.3 °C)   Resp 18   SpO2 98%   No LMP recorded.       Physical Exam     Physical Exam  Vitals and nursing note reviewed.   Constitutional:       General: She is not in acute distress.     Appearance: Normal appearance. She is not ill-appearing or toxic-appearing.   HENT:      Head: Normocephalic and atraumatic.      Right Ear: External ear normal.      Left Ear: External ear normal.      Ears:      Comments: Significant bilateral clear effusions.  No erythema     Nose: Nose normal.      Mouth/Throat:      Mouth: Mucous membranes are moist.   Eyes:      Extraocular Movements: Extraocular movements intact.   Cardiovascular:      Rate and Rhythm: Normal rate and regular rhythm.      Heart sounds: Normal heart sounds.   Pulmonary:      Effort: Pulmonary effort is normal. No respiratory distress.      Breath sounds: Normal breath sounds. No stridor. No wheezing, rhonchi or rales.   Skin:     General: Skin is warm and dry.      Capillary Refill: Capillary refill takes less than 2 seconds.      Findings: No rash.   Neurological:      Mental Status: She is alert.      Gait: Gait normal.   Psychiatric:         Behavior: Behavior normal.

## 2024-03-15 NOTE — PATIENT INSTRUCTIONS
You do not have an infection in your ears right now.  You do have some fluid behind your eardrums which comes from inflammation of the sinuses.  This can come from a cold or from allergies.  I am prescribing you 2 medications, cetirizine 10 mg daily, and Flonase nasal spray.  It takes several days to weeks of taking these consistently for you to feel that your symptoms have resolved.  If these are not covered by insurance, you can purchase them over-the-counter.  If your symptoms are not improving, please follow-up with your PCP.

## 2024-03-21 ENCOUNTER — OFFICE VISIT (OUTPATIENT)
Dept: URGENT CARE | Age: 29
End: 2024-03-21
Payer: MEDICARE

## 2024-03-21 VITALS
HEART RATE: 73 BPM | DIASTOLIC BLOOD PRESSURE: 86 MMHG | SYSTOLIC BLOOD PRESSURE: 123 MMHG | TEMPERATURE: 97.7 F | RESPIRATION RATE: 20 BRPM

## 2024-03-21 DIAGNOSIS — H60.331 ACUTE SWIMMER'S EAR OF RIGHT SIDE: Primary | ICD-10-CM

## 2024-03-21 PROCEDURE — 99213 OFFICE O/P EST LOW 20 MIN: CPT | Performed by: STUDENT IN AN ORGANIZED HEALTH CARE EDUCATION/TRAINING PROGRAM

## 2024-03-21 RX ORDER — OFLOXACIN 3 MG/ML
10 SOLUTION AURICULAR (OTIC) DAILY
Qty: 3.5 ML | Refills: 0 | Status: SHIPPED | OUTPATIENT
Start: 2024-03-21 | End: 2024-03-28

## 2024-03-21 NOTE — PROGRESS NOTES
[facility]        NAME: Jim Serrato is a 28 y.o. female  : 1995    MRN: [id number]  DATE: 2024  TIME: 11:55 AM    Assessment and Plan   Acute swimmer's ear of right side [H60.331]  1. Acute swimmer's ear of right side  ofloxacin (FLOXIN) 0.3 % otic solution            Patient Instructions   Continue with your allergy medications as prescribed.  You have swimmer's ear in your right ear.  I am sending an antibiotic eardrops for you.  Please use as prescribed.  I recommend establishing care with a primary care doctor.    We are providing you with a number to call to get established.    Follow up with PCP in 3-5 days.  Proceed to  ER if symptoms worsen.    If tests have been performed at [facility], our office will contact you with results if changes need to be made to the care plan discussed with you at the visit.  You can review your full results on [facility].    Chief Complaint     Chief Complaint   Patient presents with    Earache     Patient states that last night she developed right ear pain that has started to radiate to the jaw. She notes severe pain causing her trouble sleeping and eating. She had tylenol at around 0600. She notes pain with palpation of the ear and jaw.          History of Present Illness       Presents for pain and tenderness of her right ear that started yesterday evening.  She has had issues with eustachian tube dysfunction for which she was seen recently, she notes that after starting daily antihistamine and Flonase, her pain in her left ear resolved, now having new pain in her right ear.  Took Tylenol this morning.  Felt a little more cold than usual last night, otherwise in her usual state of health.  No fevers.        Review of Systems   Review of Systems   All other systems reviewed and are negative.        Current Medications       Current Outpatient Medications:     ofloxacin (FLOXIN) 0.3 % otic solution, Administer 10 drops to the right ear  daily for 7 days, Disp: 3.5 mL, Rfl: 0    acetaminophen (TYLENOL) 325 mg tablet, Take 2 tablets (650 mg total) by mouth every 4 (four) hours as needed for mild pain, moderate pain, severe pain, headaches or fever Use 1-2 tablets every 4-6 hours as needed. (Patient not taking: Reported on 3/14/2024), Disp: 30 tablet, Rfl: 0    benzonatate (TESSALON PERLES) 100 mg capsule, Take 1 capsule (100 mg total) by mouth 3 (three) times a day as needed for cough (Patient not taking: Reported on 3/14/2024), Disp: 21 capsule, Rfl: 0    cetirizine (ZyrTEC) 10 mg tablet, Take 1 tablet (10 mg total) by mouth daily, Disp: 30 tablet, Rfl: 0    dextromethorphan-guaifenesin (MUCINEX DM)  MG per 12 hr tablet, Take 1 tablet by mouth every 12 (twelve) hours as needed (Congestion, cough) (Patient not taking: Reported on 3/14/2024), Disp: 14 tablet, Rfl: 0    fluticasone (FLONASE) 50 mcg/act nasal spray, 2 sprays into each nostril daily, Disp: 11.1 mL, Rfl: 0    ibuprofen (MOTRIN) 600 mg tablet, Take 1 tablet (600 mg total) by mouth every 6 (six) hours as needed for mild pain, moderate pain, fever or headaches (cramping) (Patient not taking: Reported on 3/14/2024), Disp: 30 tablet, Rfl: 0    Prenatal Multivit-Min-Fe-FA (PRENATAL VITAMINS PO), Take by mouth (Patient not taking: Reported on 3/14/2024), Disp: , Rfl:     Current Allergies     Allergies as of 03/21/2024    (No Known Allergies)            The following portions of the patient's history were reviewed and updated as appropriate: allergies, current medications, past family history, past medical history, past social history, past surgical history and problem list.     No past medical history on file.    No past surgical history on file.    Family History   Problem Relation Age of Onset    No Known Problems Mother     Cancer Father         skin    Rheum arthritis Maternal Grandmother     No Known Problems Sister     No Known Problems Brother          Medications have been  verified.        Objective   /86   Pulse 73   Temp 97.7 °F (36.5 °C)   Resp 20   No LMP recorded.       Physical Exam     Physical Exam  Vitals and nursing note reviewed.   Constitutional:       General: She is not in acute distress.     Appearance: Normal appearance. She is not ill-appearing or toxic-appearing.   HENT:      Head: Normocephalic and atraumatic.      Right Ear: External ear normal.      Left Ear: External ear normal.      Ears:      Comments: Left TM with clear effusion,  Right TM without erythema, canal with erythema and white discharge  Mild tenderness of right tragus, no mastoid tenderness     Nose: Nose normal.      Mouth/Throat:      Mouth: Mucous membranes are moist.   Eyes:      Extraocular Movements: Extraocular movements intact.   Cardiovascular:      Rate and Rhythm: Normal rate and regular rhythm.      Heart sounds: Normal heart sounds.   Pulmonary:      Effort: Pulmonary effort is normal. No respiratory distress.      Breath sounds: Normal breath sounds. No stridor. No wheezing, rhonchi or rales.   Skin:     General: Skin is warm and dry.      Capillary Refill: Capillary refill takes less than 2 seconds.      Findings: No rash.   Neurological:      Mental Status: She is alert.      Gait: Gait normal.   Psychiatric:         Behavior: Behavior normal.

## 2024-03-21 NOTE — PATIENT INSTRUCTIONS
Continue with your allergy medications as prescribed.  You have swimmer's ear in your right ear.  I am sending an antibiotic eardrops for you.  Please use as prescribed.  I recommend establishing care with a primary care doctor.    We are providing you with a number to call to get established.

## 2024-03-22 ENCOUNTER — OFFICE VISIT (OUTPATIENT)
Dept: URGENT CARE | Age: 29
End: 2024-03-22
Payer: MEDICARE

## 2024-03-22 VITALS
RESPIRATION RATE: 20 BRPM | OXYGEN SATURATION: 98 % | DIASTOLIC BLOOD PRESSURE: 92 MMHG | SYSTOLIC BLOOD PRESSURE: 130 MMHG | TEMPERATURE: 98 F | HEART RATE: 88 BPM

## 2024-03-22 DIAGNOSIS — H60.501 ACUTE OTITIS EXTERNA OF RIGHT EAR, UNSPECIFIED TYPE: Primary | ICD-10-CM

## 2024-03-22 PROCEDURE — 99215 OFFICE O/P EST HI 40 MIN: CPT | Performed by: PHYSICIAN ASSISTANT

## 2024-03-22 NOTE — PATIENT INSTRUCTIONS
Proceed to the emergency department for pain control and possible head CT to rule out mastoiditis.

## 2024-03-22 NOTE — PROGRESS NOTES
[facility]        NAME: Jim Serrato is a 28 y.o. female  : 1995    MRN: [id number]  DATE: 2024  TIME: 1:59 PM    Assessment and Plan   Acute otitis externa of right ear, unspecified type [H60.501]  1. Acute otitis externa of right ear, unspecified type        Pt presents with worsening otitis externa symptoms. Discussed that the only medication I have to help control her pain is Toradol. I have recommended emergency department evaluation due to worsened symptoms.  Pt's mother becomes agitated and storms out of the room with the patient before I can explain that in the ED IV access can be established to give her IV pain medications and that her worsening symptoms are concerning for malignant otitis externa and/or mastoiditis which requires CT evaluation to diagnose.       Patient Instructions     Patient Instructions   Proceed to the emergency department for pain control and possible head CT to rule out mastoiditis.     Follow up with PCP in 3-5 days.  Proceed to  ER if symptoms worsen.    Chief Complaint     Chief Complaint   Patient presents with    Earache     Patient was seen yesterday in the clinic for right ear pain. She was given abx for swimmers ear. She notes that when she woke up she could not hear out of the right ear. Patient crying from pain         History of Present Illness       28 year old female presents with complaint of worsening right ear pain and reporting loss of hearing. She was seen for otits externa and started on ear drops yesterday. Pt is unsure if she has had a fever.     Earache   Associated symptoms include ear discharge.       Review of Systems   Review of Systems   Constitutional:  Negative for chills and fever.   HENT:  Positive for ear discharge and ear pain.          Current Medications       Current Outpatient Medications:     acetaminophen (TYLENOL) 325 mg tablet, Take 2 tablets (650 mg total) by mouth every 4 (four) hours as needed for mild pain,  moderate pain, severe pain, headaches or fever Use 1-2 tablets every 4-6 hours as needed. (Patient not taking: Reported on 3/14/2024), Disp: 30 tablet, Rfl: 0    benzonatate (TESSALON PERLES) 100 mg capsule, Take 1 capsule (100 mg total) by mouth 3 (three) times a day as needed for cough (Patient not taking: Reported on 3/14/2024), Disp: 21 capsule, Rfl: 0    cetirizine (ZyrTEC) 10 mg tablet, Take 1 tablet (10 mg total) by mouth daily, Disp: 30 tablet, Rfl: 0    dextromethorphan-guaifenesin (MUCINEX DM)  MG per 12 hr tablet, Take 1 tablet by mouth every 12 (twelve) hours as needed (Congestion, cough) (Patient not taking: Reported on 3/14/2024), Disp: 14 tablet, Rfl: 0    fluticasone (FLONASE) 50 mcg/act nasal spray, 2 sprays into each nostril daily, Disp: 11.1 mL, Rfl: 0    ibuprofen (MOTRIN) 600 mg tablet, Take 1 tablet (600 mg total) by mouth every 6 (six) hours as needed for mild pain, moderate pain, fever or headaches (cramping) (Patient not taking: Reported on 3/14/2024), Disp: 30 tablet, Rfl: 0    ofloxacin (FLOXIN) 0.3 % otic solution, Administer 10 drops to the right ear daily for 7 days, Disp: 3.5 mL, Rfl: 0    Prenatal Multivit-Min-Fe-FA (PRENATAL VITAMINS PO), Take by mouth (Patient not taking: Reported on 3/14/2024), Disp: , Rfl:     Current Allergies     Allergies as of 03/22/2024    (No Known Allergies)            The following portions of the patient's history were reviewed and updated as appropriate: allergies, current medications, past family history, past medical history, past social history, past surgical history and problem list.     No past medical history on file.    No past surgical history on file.    Family History   Problem Relation Age of Onset    No Known Problems Mother     Cancer Father         skin    Rheum arthritis Maternal Grandmother     No Known Problems Sister     No Known Problems Brother          Medications have been verified.        Objective   /92   Pulse 88    "Temp 98 °F (36.7 °C)   Resp 20   SpO2 98%   No LMP recorded.       Physical Exam     Physical Exam  Vitals and nursing note reviewed.   Constitutional:       General: She is awake. She is in acute distress.      Appearance: Normal appearance. She is well-developed and well-groomed. She is not ill-appearing, toxic-appearing or diaphoretic.      Comments: Pt crying due to severity of pain.    HENT:      Head: Normocephalic and atraumatic.      Right Ear: Decreased hearing noted. Drainage, swelling and tenderness present. There is mastoid tenderness.      Left Ear: Hearing, tympanic membrane and ear canal normal. No decreased hearing noted. No drainage, swelling or tenderness. No mastoid tenderness. Tympanic membrane is not erythematous, retracted or bulging.      Ears:      Comments: Pt with severe swelling of the right ear cannal occluding the external meatus. Significant tragal tenderness and mastoid tenderness. Pt is crying due to the severity of her pain.   Eyes:      General: Lids are normal. Vision grossly intact. Gaze aligned appropriately.   Cardiovascular:      Rate and Rhythm: Normal rate.   Pulmonary:      Effort: Pulmonary effort is normal.      Comments: Patient is speaking in full sentences with no increased respiratory effort. No audible wheezing or stridor.   Musculoskeletal:      Cervical back: Normal range of motion.   Skin:     General: Skin is warm and dry.   Neurological:      Mental Status: She is alert and oriented to person, place, and time.      Coordination: Coordination is intact.      Gait: Gait is intact.   Psychiatric:         Attention and Perception: Attention and perception normal.         Mood and Affect: Mood normal. Affect is tearful.         Speech: Speech normal.         Behavior: Behavior normal. Behavior is cooperative.               Note: Portions of this record may have been created with voice recognition software. Occasional wrong word or \"sound a like\" substitutions may " have occurred due to the inherent limitations of voice recognition software. Please read the chart carefully and recognize, using context, where substitutions have occurred.*

## 2024-05-14 ENCOUNTER — HOSPITAL ENCOUNTER (EMERGENCY)
Facility: HOSPITAL | Age: 29
Discharge: HOME | End: 2024-05-14
Attending: EMERGENCY MEDICINE | Admitting: EMERGENCY MEDICINE
Payer: MEDICARE

## 2024-05-14 VITALS
RESPIRATION RATE: 19 BRPM | OXYGEN SATURATION: 100 % | BODY MASS INDEX: 48.86 KG/M2 | TEMPERATURE: 98.6 F | DIASTOLIC BLOOD PRESSURE: 80 MMHG | WEIGHT: 293 LBS | SYSTOLIC BLOOD PRESSURE: 138 MMHG | HEART RATE: 94 BPM

## 2024-05-14 DIAGNOSIS — J02.9 ACUTE PHARYNGITIS: Primary | ICD-10-CM

## 2024-05-14 LAB
EXT PREGNANCY TEST URINE: NEGATIVE
EXT. CONTROL: NORMAL
S PYO DNA THROAT QL NAA+PROBE: DETECTED

## 2024-05-14 PROCEDURE — 99283 EMERGENCY DEPT VISIT LOW MDM: CPT

## 2024-05-14 PROCEDURE — 87651 STREP A DNA AMP PROBE: CPT | Performed by: PHYSICIAN ASSISTANT

## 2024-05-14 PROCEDURE — 99284 EMERGENCY DEPT VISIT MOD MDM: CPT | Performed by: PHYSICIAN ASSISTANT

## 2024-05-14 PROCEDURE — 81025 URINE PREGNANCY TEST: CPT | Performed by: PHYSICIAN ASSISTANT

## 2024-05-14 PROCEDURE — 87636 SARSCOV2 & INF A&B AMP PRB: CPT | Performed by: PHYSICIAN ASSISTANT

## 2024-05-14 PROCEDURE — 96372 THER/PROPH/DIAG INJ SC/IM: CPT

## 2024-05-14 RX ORDER — AMOXICILLIN 500 MG/1
500 CAPSULE ORAL 3 TIMES DAILY
Qty: 21 CAPSULE | Refills: 0 | Status: SHIPPED | OUTPATIENT
Start: 2024-05-14 | End: 2024-05-21

## 2024-05-14 RX ORDER — IBUPROFEN 600 MG/1
600 TABLET ORAL EVERY 6 HOURS PRN
Qty: 30 TABLET | Refills: 0 | Status: SHIPPED | OUTPATIENT
Start: 2024-05-14 | End: 2024-05-14

## 2024-05-14 RX ORDER — IBUPROFEN 600 MG/1
600 TABLET ORAL EVERY 6 HOURS PRN
Qty: 30 TABLET | Refills: 0 | Status: SHIPPED | OUTPATIENT
Start: 2024-05-14 | End: 2024-05-24

## 2024-05-14 RX ORDER — GUAIFENESIN/DEXTROMETHORPHAN 100-10MG/5
10 SYRUP ORAL 4 TIMES DAILY PRN
Qty: 118 ML | Refills: 0 | Status: SHIPPED | OUTPATIENT
Start: 2024-05-14

## 2024-05-14 RX ORDER — SENNOSIDES 8.6 MG
650 CAPSULE ORAL EVERY 8 HOURS PRN
Qty: 30 TABLET | Refills: 0 | Status: SHIPPED | OUTPATIENT
Start: 2024-05-14 | End: 2024-05-14

## 2024-05-14 RX ORDER — SENNOSIDES 8.6 MG
650 CAPSULE ORAL EVERY 8 HOURS PRN
Qty: 30 TABLET | Refills: 0 | Status: SHIPPED | OUTPATIENT
Start: 2024-05-14

## 2024-05-14 RX ORDER — GUAIFENESIN/DEXTROMETHORPHAN 100-10MG/5
10 SYRUP ORAL 4 TIMES DAILY PRN
Qty: 118 ML | Refills: 0 | Status: SHIPPED | OUTPATIENT
Start: 2024-05-14 | End: 2024-05-14

## 2024-05-14 RX ORDER — KETOROLAC TROMETHAMINE 30 MG/ML
15 INJECTION, SOLUTION INTRAMUSCULAR; INTRAVENOUS ONCE
Status: COMPLETED | OUTPATIENT
Start: 2024-05-14 | End: 2024-05-14

## 2024-05-14 RX ADMIN — KETOROLAC TROMETHAMINE 15 MG: 30 INJECTION, SOLUTION INTRAMUSCULAR; INTRAVENOUS at 18:03

## 2024-05-14 NOTE — DISCHARGE INSTRUCTIONS
Tylenol or Motrin for fevers/pain. Saline spray for congestion you may use Mucinex for cough and congestion increase, fluids follow-up with the family doctor. Return to the emergency department for worsening symptoms.

## 2024-05-14 NOTE — Clinical Note
Jim Serrato was seen and treated in our emergency department on 5/14/2024.                Diagnosis:     Jim  .    She may return on this date: 05/16/2024         If you have any questions or concerns, please don't hesitate to call.      Ingris Parikh PA-C    ______________________________           _______________          _______________  Hospital Representative                              Date                                Time

## 2024-05-14 NOTE — ED PROVIDER NOTES
History  Chief Complaint   Patient presents with    Flu Symptoms     Pt reports body aches, sore throat, unable to eat due to pain in throat when swallowing since last night      Presents emergency permit with sore throat and bodyaches since yesterday.  Daughter has similar symptoms.  She has had some congestion and occasional cough.  No GI upset.  No chest pain or trouble breathing.  Patient took Tylenol yesterday no medicine today.        Prior to Admission Medications   Prescriptions Last Dose Informant Patient Reported? Taking?   Prenatal Multivit-Min-Fe-FA (PRENATAL VITAMINS PO)  Self Yes No   Sig: Take by mouth   Patient not taking: Reported on 3/14/2024   benzonatate (TESSALON PERLES) 100 mg capsule   No No   Sig: Take 1 capsule (100 mg total) by mouth 3 (three) times a day as needed for cough   Patient not taking: Reported on 3/14/2024   cetirizine (ZyrTEC) 10 mg tablet   No No   Sig: Take 1 tablet (10 mg total) by mouth daily   fluticasone (FLONASE) 50 mcg/act nasal spray   No No   Si sprays into each nostril daily      Facility-Administered Medications: None       History reviewed. No pertinent past medical history.    History reviewed. No pertinent surgical history.    Family History   Problem Relation Age of Onset    No Known Problems Mother     Cancer Father         skin    Rheum arthritis Maternal Grandmother     No Known Problems Sister     No Known Problems Brother      I have reviewed and agree with the history as documented.    E-Cigarette/Vaping     E-Cigarette/Vaping Substances     Social History     Tobacco Use    Smoking status: Never    Smokeless tobacco: Never   Substance Use Topics    Alcohol use: Yes     Comment: social     Drug use: No       Review of Systems   Constitutional:  Positive for chills. Negative for fever.   HENT:  Positive for congestion, ear pain, rhinorrhea and sore throat.    Respiratory:  Positive for cough. Negative for shortness of breath and wheezing.     Cardiovascular: Negative.    Gastrointestinal: Negative.    Genitourinary: Negative.    All other systems reviewed and are negative.      Physical Exam  Physical Exam  Vitals and nursing note reviewed.   Constitutional:       Appearance: She is well-developed.   HENT:      Head: Normocephalic and atraumatic.      Right Ear: Tympanic membrane and external ear normal.      Left Ear: Tympanic membrane and external ear normal.      Nose: Congestion present.      Mouth/Throat:      Pharynx: Posterior oropharyngeal erythema present. No oropharyngeal exudate.   Eyes:      Conjunctiva/sclera: Conjunctivae normal.   Cardiovascular:      Rate and Rhythm: Normal rate and regular rhythm.      Heart sounds: Normal heart sounds.   Pulmonary:      Effort: Pulmonary effort is normal.      Breath sounds: Normal breath sounds.   Abdominal:      General: Bowel sounds are normal.      Palpations: Abdomen is soft.   Musculoskeletal:         General: Normal range of motion.      Cervical back: Neck supple.   Lymphadenopathy:      Cervical: No cervical adenopathy.   Skin:     General: Skin is warm.      Findings: No rash.   Neurological:      Mental Status: She is alert.   Psychiatric:         Behavior: Behavior normal.         Vital Signs  ED Triage Vitals [05/14/24 1633]   Temperature Pulse Respirations Blood Pressure SpO2   98.6 °F (37 °C) 94 19 138/80 100 %      Temp Source Heart Rate Source Patient Position - Orthostatic VS BP Location FiO2 (%)   Oral Monitor Sitting Right arm --      Pain Score       --           Vitals:    05/14/24 1633   BP: 138/80   Pulse: 94   Patient Position - Orthostatic VS: Sitting         Visual Acuity      ED Medications  Medications   ketorolac (TORADOL) injection 15 mg (15 mg Intramuscular Given 5/14/24 1803)       Diagnostic Studies  Results Reviewed       Procedure Component Value Units Date/Time    Strep A PCR [[id number]]  (Abnormal) Collected: 05/14/24 1748    Lab Status: Final result Specimen:  Throat Updated: 05/14/24 1823     STREP A PCR Detected    POCT pregnancy, urine [[id number]]  (Normal) Resulted: 05/14/24 1755    Lab Status: Final result Updated: 05/14/24 1755     EXT Preg Test, Ur Negative     Control Valid    FLU/COVID - if FLU clinically relevant [[id number]] Collected: 05/14/24 1748    Lab Status: In process Specimen: Nares from Nose Updated: 05/14/24 1751                   No orders to display              Procedures  Procedures         ED Course                               SBIRT 20yo+      Flowsheet Row Most Recent Value   Initial Alcohol Screen: US AUDIT-C     1. How often do you have a drink containing alcohol? 0 Filed at: 05/14/2024 1633   2. How many drinks containing alcohol do you have on a typical day you are drinking?  0 Filed at: 05/14/2024 1633   3b. FEMALE Any Age, or MALE 65+: How often do you have 4 or more drinks on one occassion? 0 Filed at: 05/14/2024 1633   Audit-C Score 0 Filed at: 05/14/2024 1633   OSCAR: How many times in the past year have you...    Used an illegal drug or used a prescription medication for non-medical reasons? Never Filed at: 05/14/2024 1633                      Medical Decision Making  Discussed medications for here patient accepts Toradol will get pregnancy test first.  Will test for strep COVID and flu.    Amount and/or Complexity of Data Reviewed  Labs: ordered.     Details: + strep - amoxil sent and called and discussed with pt     Risk  OTC drugs.  Prescription drug management.             Disposition  Final diagnoses:   Acute pharyngitis     Time reflects when diagnosis was documented in both MDM as applicable and the Disposition within this note       Time User Action Codes Description Comment    5/14/2024  5:50 PM Ingris Parikh Add [J02.9] Acute pharyngitis           ED Disposition       ED Disposition   Discharge    Condition   Stable    Date/Time   Tue May 14, 2024 1750    Comment   Jim Serrato discharge to home/self care.                    Follow-up Information       Follow up With Specialties Details Why Contact Info    [facility]    [phone]              Discharge Medication List as of 5/14/2024  5:52 PM        START taking these medications    Details   acetaminophen (TYLENOL) 650 mg CR tablet Take 1 tablet (650 mg total) by mouth every 8 (eight) hours as needed for mild pain, Starting Tue 5/14/2024, Normal      dextromethorphan-guaiFENesin (ROBITUSSIN DM)  mg/5 mL syrup Take 10 mL by mouth 4 (four) times a day as needed for congestion or cough, Starting Tue 5/14/2024, Normal      ibuprofen (MOTRIN) 600 mg tablet Take 1 tablet (600 mg total) by mouth every 6 (six) hours as needed for mild pain for up to 10 days, Starting Tue 5/14/2024, Until Fri 5/24/2024 at 2359, Normal      menthol-cetylpyridinium (CEPACOL) 3 MG lozenge Take 1 lozenge (3 mg total) by mouth as needed for sore throat, Starting Tue 5/14/2024, Normal           CONTINUE these medications which have NOT CHANGED    Details   benzonatate (TESSALON PERLES) 100 mg capsule Take 1 capsule (100 mg total) by mouth 3 (three) times a day as needed for cough, Starting Fri 4/30/2021, Normal      cetirizine (ZyrTEC) 10 mg tablet Take 1 tablet (10 mg total) by mouth daily, Starting Thu 3/14/2024, Normal      fluticasone (FLONASE) 50 mcg/act nasal spray 2 sprays into each nostril daily, Starting Thu 3/14/2024, Normal      Prenatal Multivit-Min-Fe-FA (PRENATAL VITAMINS PO) Take by mouth, Historical Med      acetaminophen (TYLENOL) 325 mg tablet Take 2 tablets (650 mg total) by mouth every 4 (four) hours as needed for mild pain, moderate pain, severe pain, headaches or fever Use 1-2 tablets every 4-6 hours as needed., Starting Wed 12/26/2018, No Print             No discharge procedures on file.    PDMP Review       None            ED Provider  Electronically Signed by             Ingris Parikh PA-C  05/14/24 8003     Statement Selected

## 2024-05-15 LAB
FLUAV RNA RESP QL NAA+PROBE: NEGATIVE
FLUBV RNA RESP QL NAA+PROBE: NEGATIVE
SARS-COV-2 RNA RESP QL NAA+PROBE: NEGATIVE

## 2024-07-26 NOTE — DISCHARGE INSTRUCTIONS
----- Message from Earline Molina sent at 7/25/2024 11:35 PM EDT -----  Pls tell pt that she has arthritis in her upper and lower back. Options are PT or pain management. Let office know what she prefers   Follow up with primary care doctor in 3-5 days  If your symptoms worsen, return to the ED immediately  Upper Respiratory Infection   WHAT YOU NEED TO KNOW:   An upper respiratory infection is also called the common cold  It is an infection that can affect your nose, throat, ears, and sinuses  For healthy people, the common cold is usually not serious and does not need special treatment  Cold symptoms are usually worst for the first 3 to 5 days  Most people get better in 7 to 14 days  You may continue to cough for 2 to 3 weeks  Colds are caused by viruses and do not get better with antibiotics  DISCHARGE INSTRUCTIONS:   Return to the emergency department if:   · You have chest pain or trouble breathing  Contact your healthcare provider if:   · You have a fever over 102ºF (39°C)  · Your sore throat gets worse or you see white or yellow spots in your throat  · Your symptoms get worse after 3 to 5 days or your cold is not better in 14 days  · You have a rash anywhere on your skin  · You have large, tender lumps in your neck  · You have thick, green or yellow drainage from your nose  · You cough up thick yellow, green, or bloody mucus  · You have vomiting for more than 24 hours and cannot keep fluids down  · You have a bad earache  · You have questions or concerns about your condition or care  Medicines: You may need any of the following:  · Decongestants  help reduce nasal congestion and help you breathe more easily  If you take decongestant pills, they may make you feel restless or cause problems with your sleep  Do not use decongestant sprays for more than a few days  · Cough suppressants  help reduce coughing  Ask your healthcare provider which type of cough medicine is best for you  · NSAIDs , such as ibuprofen, help decrease swelling, pain, and fever  NSAIDs can cause stomach bleeding or kidney problems in certain people   If you take blood thinner medicine, always ask your healthcare provider if NSAIDs are safe for you  Always read the medicine label and follow directions  · Acetaminophen  decreases pain and fever  It is available without a doctor's order  Ask how much to take and how often to take it  Follow directions  Read the labels of all other medicines you are using to see if they also contain acetaminophen, or ask your doctor or pharmacist  Acetaminophen can cause liver damage if not taken correctly  Do not use more than 4 grams (4,000 milligrams) total of acetaminophen in one day  · Take your medicine as directed  Contact your healthcare provider if you think your medicine is not helping or if you have side effects  Tell him or her if you are allergic to any medicine  Keep a list of the medicines, vitamins, and herbs you take  Include the amounts, and when and why you take them  Bring the list or the pill bottles to follow-up visits  Carry your medicine list with you in case of an emergency  Follow up with your healthcare provider as directed:  Write down your questions so you remember to ask them during your visits  Self-care:   · Rest as much as possible  Slowly start to do more each day  · Drink more liquids as directed  Liquids will help thin and loosen mucus so you can cough it up  Liquids will also help prevent dehydration  Liquids that help prevent dehydration include water, fruit juice, and broth  Do not drink liquids that contain caffeine  Caffeine can increase your risk for dehydration  Ask your healthcare provider how much liquid to drink each day  · Soothe a sore throat  Gargle with warm salt water  This helps your sore throat feel better  Make salt water by dissolving ¼ teaspoon salt in 1 cup warm water  You may also suck on hard candy or throat lozenges  You may use a sore throat spray  · Use a humidifier or vaporizer  Use a cool mist humidifier or a vaporizer to increase air moisture in your home   This may make it easier for you to breathe and help decrease your cough  · Use saline nasal drops as directed  These help relieve congestion  · Apply petroleum-based jelly around the outside of your nostrils  This can decrease irritation from blowing your nose  · Do not smoke  Nicotine and other chemicals in cigarettes and cigars can make your symptoms worse  They can also cause infections such as bronchitis or pneumonia  Ask your healthcare provider for information if you currently smoke and need help to quit  E-cigarettes or smokeless tobacco still contain nicotine  Talk to your healthcare provider before you use these products  Prevent spreading your cold to others:   · Try to stay away from other people during the first 2 to 3 days of your cold when it is more easily spread  · Do not share food or drinks  · Do not share hand towels with household members  · Wash your hands often, especially after you blow your nose  Turn away from other people and cover your mouth and nose with a tissue when you sneeze or cough  © 2017 [facility] Information is for End User's use only and may not be sold, redistributed or otherwise used for commercial purposes  All illustrations and images included in CareNotes® are the copyrighted property of [employer]  or [employer]  The above information is an  only  It is not intended as medical advice for individual conditions or treatments  Talk to your doctor, nurse or pharmacist before following any medical regimen to see if it is safe and effective for you